# Patient Record
Sex: FEMALE | Race: WHITE
[De-identification: names, ages, dates, MRNs, and addresses within clinical notes are randomized per-mention and may not be internally consistent; named-entity substitution may affect disease eponyms.]

---

## 2017-07-20 ENCOUNTER — HOSPITAL ENCOUNTER (INPATIENT)
Dept: HOSPITAL 47 - EC | Age: 72
LOS: 1 days | Discharge: HOME | DRG: 312 | End: 2017-07-21
Attending: HOSPITALIST | Admitting: HOSPITALIST
Payer: MEDICARE

## 2017-07-20 VITALS — BODY MASS INDEX: 17.9 KG/M2

## 2017-07-20 DIAGNOSIS — Z79.899: ICD-10-CM

## 2017-07-20 DIAGNOSIS — Z85.828: ICD-10-CM

## 2017-07-20 DIAGNOSIS — E78.5: ICD-10-CM

## 2017-07-20 DIAGNOSIS — F17.200: ICD-10-CM

## 2017-07-20 DIAGNOSIS — G25.0: ICD-10-CM

## 2017-07-20 DIAGNOSIS — E83.42: ICD-10-CM

## 2017-07-20 DIAGNOSIS — E86.9: ICD-10-CM

## 2017-07-20 DIAGNOSIS — I95.9: ICD-10-CM

## 2017-07-20 DIAGNOSIS — R55: Primary | ICD-10-CM

## 2017-07-20 DIAGNOSIS — I10: ICD-10-CM

## 2017-07-20 DIAGNOSIS — T50.2X5A: ICD-10-CM

## 2017-07-20 DIAGNOSIS — R00.1: ICD-10-CM

## 2017-07-20 DIAGNOSIS — K21.9: ICD-10-CM
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
## 2017-07-20 DIAGNOSIS — J44.9: ICD-10-CM

## 2017-07-20 DIAGNOSIS — M19.90: ICD-10-CM

## 2017-07-20 LAB
ALP SERPL-CCNC: 107 U/L (ref 38–126)
ALT SERPL-CCNC: 30 U/L (ref 9–52)
ANION GAP SERPL CALC-SCNC: 9 MMOL/L
APTT BLD: 25 SEC (ref 22–30)
AST SERPL-CCNC: 17 U/L (ref 14–36)
BASOPHILS # BLD AUTO: 0.1 K/UL (ref 0–0.2)
BASOPHILS NFR BLD AUTO: 1 %
BUN SERPL-SCNC: 19 MG/DL (ref 7–17)
CALCIUM SPEC-MCNC: 9 MG/DL (ref 8.4–10.2)
CH: 30.7
CHCM: 34.1
CHLORIDE SERPL-SCNC: 105 MMOL/L (ref 98–107)
CK SERPL-CCNC: 40 U/L (ref 30–135)
CK SERPL-CCNC: 49 U/L (ref 30–135)
CO2 SERPL-SCNC: 25 MMOL/L (ref 22–30)
EOSINOPHIL # BLD AUTO: 0.1 K/UL (ref 0–0.7)
EOSINOPHIL NFR BLD AUTO: 1 %
ERYTHROCYTE [DISTWIDTH] IN BLOOD BY AUTOMATED COUNT: 4.75 M/UL (ref 3.8–5.4)
ERYTHROCYTE [DISTWIDTH] IN BLOOD: 14.3 % (ref 11.5–15.5)
GLUCOSE SERPL-MCNC: 145 MG/DL (ref 74–99)
HCT VFR BLD AUTO: 43 % (ref 34–46)
HDW: 2.46
HGB BLD-MCNC: 14.5 GM/DL (ref 11.4–16)
INR PPP: 1.2 (ref ?–1.2)
LUC NFR BLD AUTO: 1 %
LYMPHOCYTES # SPEC AUTO: 1.3 K/UL (ref 1–4.8)
LYMPHOCYTES NFR SPEC AUTO: 17 %
MAGNESIUM SPEC-SCNC: 1.5 MG/DL (ref 1.6–2.3)
MCH RBC QN AUTO: 30.4 PG (ref 25–35)
MCHC RBC AUTO-ENTMCNC: 33.6 G/DL (ref 31–37)
MCV RBC AUTO: 90.4 FL (ref 80–100)
MONOCYTES # BLD AUTO: 0.3 K/UL (ref 0–1)
MONOCYTES NFR BLD AUTO: 4 %
NEUTROPHILS # BLD AUTO: 5.7 K/UL (ref 1.3–7.7)
NEUTROPHILS NFR BLD AUTO: 77 %
NON-AFRICAN AMERICAN GFR(MDRD): >60
PH UR: 7.5 [PH] (ref 5–8)
PHOSPHATE SERPL-MCNC: 3 MG/DL (ref 2.5–4.5)
POTASSIUM SERPL-SCNC: 3.2 MMOL/L (ref 3.5–5.1)
PROT SERPL-MCNC: 5.4 G/DL (ref 6.3–8.2)
PT BLD: 11.7 SEC (ref 9–12)
SODIUM SERPL-SCNC: 139 MMOL/L (ref 137–145)
SP GR UR: 1 (ref 1–1.03)
TROPONIN I SERPL-MCNC: <0.012 NG/ML (ref 0–0.03)
TROPONIN I SERPL-MCNC: <0.012 NG/ML (ref 0–0.03)
UA BILLING (MACRO VS. MICRO): (no result)
UROBILINOGEN UR QL STRIP: <2 MG/DL (ref ?–2)
WBC # BLD AUTO: 0.06 10*3/UL
WBC # BLD AUTO: 7.4 K/UL (ref 3.8–10.6)
WBC (PEROX): 7.09

## 2017-07-20 PROCEDURE — 84100 ASSAY OF PHOSPHORUS: CPT

## 2017-07-20 PROCEDURE — 85025 COMPLETE CBC W/AUTO DIFF WBC: CPT

## 2017-07-20 PROCEDURE — 81003 URINALYSIS AUTO W/O SCOPE: CPT

## 2017-07-20 PROCEDURE — 80061 LIPID PANEL: CPT

## 2017-07-20 PROCEDURE — 93005 ELECTROCARDIOGRAM TRACING: CPT

## 2017-07-20 PROCEDURE — 85730 THROMBOPLASTIN TIME PARTIAL: CPT

## 2017-07-20 PROCEDURE — 36415 COLL VENOUS BLD VENIPUNCTURE: CPT

## 2017-07-20 PROCEDURE — 85027 COMPLETE CBC AUTOMATED: CPT

## 2017-07-20 PROCEDURE — 80048 BASIC METABOLIC PNL TOTAL CA: CPT

## 2017-07-20 PROCEDURE — 99285 EMERGENCY DEPT VISIT HI MDM: CPT

## 2017-07-20 PROCEDURE — 83735 ASSAY OF MAGNESIUM: CPT

## 2017-07-20 PROCEDURE — 82553 CREATINE MB FRACTION: CPT

## 2017-07-20 PROCEDURE — 93306 TTE W/DOPPLER COMPLETE: CPT

## 2017-07-20 PROCEDURE — 71020: CPT

## 2017-07-20 PROCEDURE — 85610 PROTHROMBIN TIME: CPT

## 2017-07-20 PROCEDURE — 82550 ASSAY OF CK (CPK): CPT

## 2017-07-20 PROCEDURE — 84484 ASSAY OF TROPONIN QUANT: CPT

## 2017-07-20 PROCEDURE — 80053 COMPREHEN METABOLIC PANEL: CPT

## 2017-07-20 RX ADMIN — CEFAZOLIN SCH MLS/HR: 330 INJECTION, POWDER, FOR SOLUTION INTRAMUSCULAR; INTRAVENOUS at 23:54

## 2017-07-20 RX ADMIN — MAGNESIUM SULFATE IN DEXTROSE SCH MLS/HR: 10 INJECTION, SOLUTION INTRAVENOUS at 16:05

## 2017-07-20 RX ADMIN — MAGNESIUM SULFATE IN DEXTROSE SCH MLS/HR: 10 INJECTION, SOLUTION INTRAVENOUS at 17:19

## 2017-07-20 RX ADMIN — CEFAZOLIN SCH MLS/HR: 330 INJECTION, POWDER, FOR SOLUTION INTRAMUSCULAR; INTRAVENOUS at 13:12

## 2017-07-20 NOTE — P.HPIM
History of Present Illness


Patient is a pleasant 71-year-old female came in with complaints of dizziness, 

denied any syncopal episode but patient apparently had a fall secondary to 

dizziness did not hurt anywhere, denied any seizure-like activity.  Patient 

denied any diarrhea denied any nausea vomiting.  Patient is on 

hydrochlorothiazide and ACE inhibitor combination at home, patient blood 

pressures are in the low normal side and has some sinus bradycardia.  Patient 

is admitted for overnight monitoring with telemetry and echocardiogram will be 

obtained.  Troponin so far negative patient denied any chest pain denied any 

palpitations.  Patient patient's creatinine is bit elevated probably due to 

intravascular volume depletion which may have contributed to her dizziness.  

Patient was started on IV normal saline.








Review of Systems





REVIEW OF SYSTEMS: 


CONSTITUTIONAL: No fever, no malaise, no fatigue. 


HEENT: No recent visual problems or hearing problems. Denied any sore throat. 


CARDIOVASCULAR: No chest pain, orthopnea, PND, no palpitations, no syncope. 


PULMONARY: No shortness of breath, no cough, no hemoptysis. 


GASTROINTESTINAL: No diarrhea, no nausea, no vomiting, no abdominal pain. 

Normoactive bowel sounds. 


NEUROLOGICAL: No headaches, no weakness, no numbness. 


HEMATOLOGICAL: Denies any bleeding or petechiae. 


GENITOURINARY: Denies any burning micturition, frequency, or urgency. 


MUSCULOSKELETAL/RHEUMATOLOGICAL: Denies any joint pain, swelling, or any muscle 

pain. 


ENDOCRINE: Denies any polyuria or polydipsia. 





The rest of the 14-point review of systems is negative.








Past Medical History


Past Medical History: Cancer, COPD, GERD/Reflux, Hyperlipidemia, Hypertension, 

Osteoarthritis (OA), Renal Disease, Syncope


Additional Past Medical History / Comment(s): SYNCOPAL EPISODES RECENTLY, 

ESSENTIAL TREMORS, djd, NEPHROLITHIASIS-PT PASSED STONE ON HER WON, SKIN 

CANCERS WITH REMOVAL.


History of Any Multi-Drug Resistant Organisms: None Reported


Past Surgical History: Hysterectomy, Tonsillectomy


Additional Past Surgical History / Comment(s): SKIN CANCER REMOVALS, 

COLONOSCOPY.


Past Anesthesia/Blood Transfusion Reactions: No Reported Reaction


Smoking Status: Current every day smoker





- Past Family History


  ** Mother


Additional Family Medical History / Comment(s): MOTHER HAD A "BAD HEART."





  ** Father


Additional Family Medical History / Comment(s): FATHER  IN A PLANE CRASH 

EARLIER IN LIFE.





Medications and Allergies


 Home Medications











 Medication  Instructions  Recorded  Confirmed  Type


 


Atorvastatin [Lipitor] 20 mg PO HS 17 History


 


Enalapril/Hydrochlorothiazide 1 tab PO DAILY 17 History





[Enalapril-Hctz 10-25 mg Tablet]    


 


risperiDONE [RisperDAL] 2 mg PO DAILY 17 History











 Allergies











Allergy/AdvReac Type Severity Reaction Status Date / Time


 


No Known Allergies Allergy   Verified 17 10:49














Physical Exam


Vitals: 


 Vital Signs











  Temp Pulse Pulse Resp BP BP Pulse Ox


 


 17 14:00  96.9 F L   58 L  18   146/79  100


 


 17 13:39  97.4 F L      


 


 17 13:15   67   20  109/60   97


 


 17 12:52        100


 


 17 12:22   56 L   16  115/58   97


 


 17 11:20   53 L   18  118/87   98


 


 17 10:59    47 L    


 


 17 10:49  96.8 F L  55 L   16  80/50   100








 Intake and Output











 17





 06:59 14:59 22:59


 


Intake Total  100 


 


Balance  100 


 


Intake:   


 


  Amount of Fluid Infused (  100 





  ml)   


 


Other:   


 


  Voiding Method  Bedpan 


 


  Weight  54.5 kg 








 Patient Weight











 17





 06:59


 


Weight 54.5 kg














PHYSICAL EXAMINATION: 





GENERAL: The patient is alert and oriented x3, not in any acute distress. Well 

developed, well nourished. 


HEENT: Pupils are round and equally reacting to light. EOMI. No scleral 

icterus. No conjunctival pallor. Normocephalic, atraumatic. No pharyngeal 

erythema. No thyromegaly. 


CARDIOVASCULAR: S1 and S2 present. No murmurs, rubs, or gallops. 


PULMONARY: Chest is clear to auscultation, no wheezing or crackles. 


ABDOMEN: Soft, nontender, nondistended, normoactive bowel sounds. No palpable 

organomegaly. 


MUSCULOSKELETAL: No joint swelling or deformity.


EXTREMITIES: No cyanosis, clubbing, or pedal edema. 


NEUROLOGICAL: Gross neurological examination did not reveal any focal deficits. 


SKIN: No rashes. 








Results


CBC & Chem 7: 


 17 11:05





 17 11:05


Labs: 


 Abnormal Lab Results - Last 24 Hours (Table)











  17 Range/Units





  11:05 11:05 12:00 


 


INR   1.2 H   (<1.2)  


 


Potassium  3.2 L    (3.5-5.1)  mmol/L


 


BUN  19 H    (7-17)  mg/dL


 


Glucose  145 H    (74-99)  mg/dL


 


Magnesium  1.5 L    (1.6-2.3)  mg/dL


 


Total Protein  5.4 L    (6.3-8.2)  g/dL


 


Albumin  3.2 L    (3.5-5.0)  g/dL


 


Urine Protein    Trace H  (Negative)  














Thrombosis Risk Factor Assmnt





- Choose All That Apply


Any of the Below Risk Factors Present?: Yes


Each Factor Represents 1 point: Abnormal pulmonary function (COPD)


Other Risk Factors: Yes


Each Risk Factor Represents 2 Points: Age 61-74 years, Malignancy


Other congenital or acquired thrombophilia - If yes, enter type in comment: No


Thrombosis Risk Factor Assessment Total Risk Factor Score: 5


Thrombosis Risk Factor Assessment Level: High Risk





Assessment and Plan


Plan: 





#1 dizziness and near syncopal episode, orthostatic vitals are negative I 

believe it's secondary to intravascular volume depletion and diuretic therapy 

she is on for hypertension.  And evidences will be discontinued.  And patient 

will be started on IV fluids as mentioned above.  The limited monitoring, 

echocardiogram.


#2 hypomagnesemia: Magnesium will be supplemented.


#3 mild sinus bradycardia my suspicion is low that this contributed to her near 

syncopal episode.  But will monitor here.


#3 hypertension: Because of low normal blood pressures her antihypertensive 

medications are being held.

## 2017-07-20 NOTE — XR
EXAMINATION TYPE: XR chest 2V

 

DATE OF EXAM: 7/20/2017

 

COMPARISON: 5/27/2013

 

HISTORY: Shortness of breath

 

TECHNIQUE:  Frontal and lateral views of the chest are obtained.

 

FINDINGS:

 

Scattered senescent parenchymal changes noted. Hyperinflation compatible with COPD. 

 

No evidence for infiltrate. No evidence for atelectasis.

 

Heart size is stable.

 

Mediastinal structures are stable and grossly unremarkable.

 

No evidence for hilar prominence.

 

Degenerative changes dorsal spine. 

 

IMPRESSION:

1. No evidence for acute pulmonary disease.

## 2017-07-20 NOTE — ED
General Adult HPI





- General


Stated complaint: Syncopial episode


Time Seen by Provider: 17 10:56


Source: RN notes reviewed, old records reviewed





- History of Present Illness


Initial comments: 





This is a 71-year-old female ER status post syncopal event.  Patient is poor 

strain, history obtained from EMS and patient's family





- Related Data


 Home Medications











 Medication  Instructions  Recorded  Confirmed


 


Atorvastatin [Lipitor] 20 mg PO HS 17


 


risperiDONE [RisperDAL] 2 mg PO DAILY 17











 Allergies











Allergy/AdvReac Type Severity Reaction Status Date / Time


 


No Known Allergies Allergy   Verified 17 10:49














Review of Systems


ROS Statement: 


Those systems with pertinent positive or pertinent negative responses have been 

documented in the HPI.





ROS Other: All systems not noted in ROS Statement are negative.





Past Medical History





- Past Family History


  ** Mother


Additional Family Medical History / Comment(s): MOTHER HAD A "BAD HEART."





  ** Father


Additional Family Medical History / Comment(s): FATHER  IN A PLANE CRASH 

EARLIER IN LIFE.





General Exam


General appearance: alert, in no apparent distress


Head exam: Present: atraumatic, normocephalic, normal inspection


Eye exam: Present: normal appearance, PERRL, EOMI.  Absent: scleral icterus, 

conjunctival injection, periorbital swelling


ENT exam: Present: normal exam, mucous membranes moist


Neck exam: Present: normal inspection.  Absent: tenderness, meningismus, 

lymphadenopathy


Respiratory exam: Present: normal lung sounds bilaterally.  Absent: respiratory 

distress, wheezes, rales, rhonchi, stridor


Cardiovascular Exam: Present: regular rate, normal rhythm, normal heart sounds.

  Absent: systolic murmur, diastolic murmur, rubs, gallop, clicks


GI/Abdominal exam: Present: soft, normal bowel sounds.  Absent: distended, 

tenderness, guarding, rebound, rigid


Extremities exam: Present: normal inspection, full ROM, normal capillary 

refill.  Absent: tenderness, pedal edema, joint swelling, calf tenderness


Back exam: Present: normal inspection


Neurological exam: Present: alert, oriented X3, CN II-XII intact


Psychiatric exam: Present: normal affect, normal mood


Skin exam: Present: warm, dry, intact, normal color.  Absent: rash





Course


 Vital Signs











  17





  10:49 10:59 11:20


 


Temperature 96.8 F L  


 


Pulse Rate 55 L  53 L


 


Pulse Rate [  47 L 





Cardiac Monitor   





]   


 


Respiratory 16  18





Rate   


 


Blood Pressure 80/50  118/87


 


O2 Sat by Pulse 100  98





Oximetry   














  17





  12:22 12:52 13:15


 


Temperature   


 


Pulse Rate 56 L  67


 


Pulse Rate [   





Cardiac Monitor   





]   


 


Respiratory 16  20





Rate   


 


Blood Pressure 115/58  109/60


 


O2 Sat by Pulse 97 100 97





Oximetry   














  17





  13:39


 


Temperature 97.4 F L


 


Pulse Rate 


 


Pulse Rate [ 





Cardiac Monitor 





] 


 


Respiratory 





Rate 


 


Blood Pressure 


 


O2 Sat by Pulse 





Oximetry 














EKG Findings





- EKG Comments:


EKG Findings:: EKG shows sinus bradycardia rate of 54, pO2 of 60, QRS 1:30, QTC 

464





Medical Decision Making





- Medical Decision Making





71 female to the ER for evaluation of syncopal event.  Multiple syncopal events 

in the last few weeks to months.  Patient had 2 syncopal events today with full 

class at the grocery store.  And then when she really did resolve and her 

resolved, patient stood up and then had another syncopal event, question 

arrhythmia, patient has W Sandoval secondary to decreased appetite.  Patient be 

admitted, O replacement and cardiac evaluation





- Lab Data


Result diagrams: 


 17 06:04





 17 06:04


 Lab Results











  17 Range/Units





  11:05 11:05 11:05 


 


WBC   7.4   (3.8-10.6)  k/uL


 


RBC   4.75   (3.80-5.40)  m/uL


 


Hgb   14.5   (11.4-16.0)  gm/dL


 


Hct   43.0   (34.0-46.0)  %


 


MCV   90.4   (80.0-100.0)  fL


 


MCH   30.4   (25.0-35.0)  pg


 


MCHC   33.6   (31.0-37.0)  g/dL


 


RDW   14.3   (11.5-15.5)  %


 


Plt Count   227   (150-450)  k/uL


 


Neutrophils %   77   %


 


Lymphocytes %   17   %


 


Monocytes %   4   %


 


Eosinophils %   1   %


 


Basophils %   1   %


 


Neutrophils #   5.7   (1.3-7.7)  k/uL


 


Lymphocytes #   1.3   (1.0-4.8)  k/uL


 


Monocytes #   0.3   (0-1.0)  k/uL


 


Eosinophils #   0.1   (0-0.7)  k/uL


 


Basophils #   0.1   (0-0.2)  k/uL


 


PT     (9.0-12.0)  sec


 


INR     (<1.2)  


 


APTT     (22.0-30.0)  sec


 


Sodium    139  (137-145)  mmol/L


 


Potassium    3.2 L  (3.5-5.1)  mmol/L


 


Chloride    105  ()  mmol/L


 


Carbon Dioxide    25  (22-30)  mmol/L


 


Anion Gap    9  mmol/L


 


BUN    19 H  (7-17)  mg/dL


 


Creatinine    0.75  (0.52-1.04)  mg/dL


 


Est GFR (MDRD) Af Amer    >60  (>60 ml/min/1.73 sqM)  


 


Est GFR (MDRD) Non-Af    >60  (>60 ml/min/1.73 sqM)  


 


Glucose    145 H  (74-99)  mg/dL


 


Calcium    9.0  (8.4-10.2)  mg/dL


 


Phosphorus    3.0  (2.5-4.5)  mg/dL


 


Magnesium    1.5 L  (1.6-2.3)  mg/dL


 


Total Bilirubin    1.2  (0.2-1.3)  mg/dL


 


AST    17  (14-36)  U/L


 


ALT    30  (9-52)  U/L


 


Alkaline Phosphatase    107  ()  U/L


 


Total Creatine Kinase  40    ()  U/L


 


CK-MB (CK-2)  1.6    (0.0-2.4)  ng/mL


 


CK-MB (CK-2) Rel Index  4.0    


 


Troponin I  <0.012    (0.000-0.034)  ng/mL


 


Total Protein    5.4 L  (6.3-8.2)  g/dL


 


Albumin    3.2 L  (3.5-5.0)  g/dL


 


Urine Color     


 


Urine Appearance     (Clear)  


 


Urine pH     (5.0-8.0)  


 


Ur Specific Gravity     (1.001-1.035)  


 


Urine Protein     (Negative)  


 


Urine Glucose (UA)     (Negative)  


 


Urine Ketones     (Negative)  


 


Urine Blood     (Negative)  


 


Urine Nitrite     (Negative)  


 


Urine Bilirubin     (Negative)  


 


Urine Urobilinogen     (<2.0)  mg/dL


 


Ur Leukocyte Esterase     (Negative)  














  07/20/17 07/20/17 Range/Units





  11:05 12:00 


 


WBC    (3.8-10.6)  k/uL


 


RBC    (3.80-5.40)  m/uL


 


Hgb    (11.4-16.0)  gm/dL


 


Hct    (34.0-46.0)  %


 


MCV    (80.0-100.0)  fL


 


MCH    (25.0-35.0)  pg


 


MCHC    (31.0-37.0)  g/dL


 


RDW    (11.5-15.5)  %


 


Plt Count    (150-450)  k/uL


 


Neutrophils %    %


 


Lymphocytes %    %


 


Monocytes %    %


 


Eosinophils %    %


 


Basophils %    %


 


Neutrophils #    (1.3-7.7)  k/uL


 


Lymphocytes #    (1.0-4.8)  k/uL


 


Monocytes #    (0-1.0)  k/uL


 


Eosinophils #    (0-0.7)  k/uL


 


Basophils #    (0-0.2)  k/uL


 


PT  11.7   (9.0-12.0)  sec


 


INR  1.2 H   (<1.2)  


 


APTT  25.0   (22.0-30.0)  sec


 


Sodium    (137-145)  mmol/L


 


Potassium    (3.5-5.1)  mmol/L


 


Chloride    ()  mmol/L


 


Carbon Dioxide    (22-30)  mmol/L


 


Anion Gap    mmol/L


 


BUN    (7-17)  mg/dL


 


Creatinine    (0.52-1.04)  mg/dL


 


Est GFR (MDRD) Af Amer    (>60 ml/min/1.73 sqM)  


 


Est GFR (MDRD) Non-Af    (>60 ml/min/1.73 sqM)  


 


Glucose    (74-99)  mg/dL


 


Calcium    (8.4-10.2)  mg/dL


 


Phosphorus    (2.5-4.5)  mg/dL


 


Magnesium    (1.6-2.3)  mg/dL


 


Total Bilirubin    (0.2-1.3)  mg/dL


 


AST    (14-36)  U/L


 


ALT    (9-52)  U/L


 


Alkaline Phosphatase    ()  U/L


 


Total Creatine Kinase    ()  U/L


 


CK-MB (CK-2)    (0.0-2.4)  ng/mL


 


CK-MB (CK-2) Rel Index    


 


Troponin I    (0.000-0.034)  ng/mL


 


Total Protein    (6.3-8.2)  g/dL


 


Albumin    (3.5-5.0)  g/dL


 


Urine Color   Light Yellow  


 


Urine Appearance   Clear  (Clear)  


 


Urine pH   7.5  (5.0-8.0)  


 


Ur Specific Gravity   1.005  (1.001-1.035)  


 


Urine Protein   Trace H  (Negative)  


 


Urine Glucose (UA)   Negative  (Negative)  


 


Urine Ketones   Negative  (Negative)  


 


Urine Blood   Negative  (Negative)  


 


Urine Nitrite   Negative  (Negative)  


 


Urine Bilirubin   Negative  (Negative)  


 


Urine Urobilinogen   <2.0  (<2.0)  mg/dL


 


Ur Leukocyte Esterase   Negative  (Negative)  














- Radiology Data


Radiology results: report reviewed (chest x-rays negative for acute disease), 

image reviewed





Disposition


Clinical Impression: 


 Syncope and collapse





Disposition: ADMITTED AS IP TO THIS [facility]


Condition: Stable

## 2017-07-21 VITALS — SYSTOLIC BLOOD PRESSURE: 135 MMHG | HEART RATE: 54 BPM | TEMPERATURE: 98.4 F | DIASTOLIC BLOOD PRESSURE: 61 MMHG

## 2017-07-21 VITALS — RESPIRATION RATE: 18 BRPM

## 2017-07-21 LAB
ANION GAP SERPL CALC-SCNC: 6 MMOL/L
BUN SERPL-SCNC: 18 MG/DL (ref 7–17)
CALCIUM SPEC-MCNC: 8.6 MG/DL (ref 8.4–10.2)
CH: 30.4
CHCM: 33.2
CHLORIDE SERPL-SCNC: 109 MMOL/L (ref 98–107)
CHOLEST SERPL-MCNC: 120 MG/DL (ref ?–200)
CK SERPL-CCNC: 45 U/L (ref 30–135)
CO2 SERPL-SCNC: 25 MMOL/L (ref 22–30)
ERYTHROCYTE [DISTWIDTH] IN BLOOD BY AUTOMATED COUNT: 4.54 M/UL (ref 3.8–5.4)
ERYTHROCYTE [DISTWIDTH] IN BLOOD: 14.2 % (ref 11.5–15.5)
GLUCOSE SERPL-MCNC: 82 MG/DL (ref 74–99)
HCT VFR BLD AUTO: 41.8 % (ref 34–46)
HDLC SERPL-MCNC: 51 MG/DL (ref 40–60)
HDW: 2.36
HGB BLD-MCNC: 14 GM/DL (ref 11.4–16)
MCH RBC QN AUTO: 30.8 PG (ref 25–35)
MCHC RBC AUTO-ENTMCNC: 33.4 G/DL (ref 31–37)
MCV RBC AUTO: 92.1 FL (ref 80–100)
NON-AFRICAN AMERICAN GFR(MDRD): >60
POTASSIUM SERPL-SCNC: 3.9 MMOL/L (ref 3.5–5.1)
SODIUM SERPL-SCNC: 140 MMOL/L (ref 137–145)
TRIGL SERPL-MCNC: 54 MG/DL (ref ?–150)
TROPONIN I SERPL-MCNC: 0.01 NG/ML (ref 0–0.03)
WBC # BLD AUTO: 6.3 K/UL (ref 3.8–10.6)

## 2017-07-21 RX ADMIN — CEFAZOLIN SCH MLS/HR: 330 INJECTION, POWDER, FOR SOLUTION INTRAMUSCULAR; INTRAVENOUS at 08:49

## 2017-07-21 NOTE — CONS
Lee Ann is a 71 year old lady who presented to the hospital with dizziness and 
near syncope.   The patient was shopping at [employer], felt very dizzy, went down 
to her feet and did not have any loss of consciousness.  She denies any 
seizures.  She denies any bladder or bowel incontinence or focal neurological 
deficits.  She gradually got back to her feet and felt well.  The patient has 
lost quite a bit of weight.  She used to weight about 160 pounds and is 
currently in the 117 pounds.  Initially she tried to lose weight.  Subsequently 
she has not been able to stop losing weight.  The patient is on ace inhibitor, 
diuretic combination for her hypertension, probably her dizziness and near 
syncope are related to intravascular volume depletion and hypotension.  I am 
going to start the medication.   At the time of my evaluation this morning, she 
appears comfortable at rest, ambulating without any problems.  Remains in sinus 
rhythm.  EKG does not reveal acute ischemic changes.  Cardiac enzymes have been 
negative.  She has not had further episodes of dizziness or near syncope.





Past medical history is significant for hypertension, dyslipidemia. 



Medications include:

1.  Lipitor.

2.  Hydrochlorothiazide.

3.  Risperdal.



ALLERGIES:  NO DRUG ALLERGIES. 





Family history is negative for premature coronary artery disease.   



Social history is negative for smoking, ETOH abuse or drug abuse.  



Review of systems:  HEENT:  Unremarkable.  Cardiac as described above. 
Respiratory negative.  GI negative.  : Negative.  Musculoskeletal significant 
for arthritis.  Psych/social:  Negative.  Endocrine:  Negative.  Dermatological
:  Negative.  Constitutional: Negative. Oncological: Negative.  The rest of the 
system review is not relevant.   



On exam, comfortable at rest.  Vital signs are stable. There are no orthostatic 
changes.  There is no jugular venous distention.  Carotid upstroke is normal.  
There is no bruit.   Chest exam reveals good air entry bilaterally.  Heart exam 
first and second heart sounds.  No gallop.   Abdomen is soft, nontender.  
Examination of the extremities did not reveal any edema.  Peripheral pulses are 
felt. CNS exam did not reveal any focal neurological deficits.   



EKG shows sinus bradycardia.   Rhythm strip shows that the patient is in sinus 
rhythm without any episodes of high grade AV block.    Cardiac enzymes have 
been negative.   LDL cholesterol is 58.  



ASSESSMENT:  Near syncope, probably related to hypotension and intravascular 
volume depletion.   



PLAN:  I will obtain a 2D echo.  Ambulate her.  Stop the ace inhibitor diuretic 
combination that she is on. When stable, she can be discharged home.  I talked 
to her about her diet. She will follow up with me and we may consider an 
outpatient stress test along with Holter monitor if necessary. 
DARRION

## 2017-07-21 NOTE — ECHOF
Referral Reason:syncope



MEASUREMENTS

--------

HEIGHT: 170.2 cm

WEIGHT: 54.4 kg

BP: 146/79

RVIDd:   1.9 cm     (< 3.3)

IVSd:   0.8 cm     (0.6 - 1.1)

LVIDd:   4.4 cm     (3.9 - 5.3)

LVPWd:   0.9 cm     (0.6 - 1.1)

IVSs:   1.2 cm

LVIDs:   2.6 cm

LVPWs:   1.2 cm

LAESV Index (A-L):   10.83 ml/m

Ao Diam:   2.9 cm     (2.0 - 3.7)

AV Cusp:   1.4 cm     (1.5 - 2.6)

LA Diam:   2.2 cm     (2.7 - 3.8)

MV EXCURSION:   17.007 mm     (> 18.000)

MV EF SLOPE:   80 mm/s     (70 - 150)

EPSS:   0.7 cm

MV E Rafat:   0.60 m/s

MV DecT:   277 ms

MV A Rafat:   0.69 m/s

MV E/A Ratio:   0.87 

RAP:   5.00 mmHg

RVSP:   27.22 mmHg







FINDINGS

--------

Sinus rhythm.   Resting bradycardia (HR<60bpm).

This was a technically good study.

The left ventricular size is normal.   Overall left 

ventricular systolic function is normal with, an EF 

between 55 - 60 %.

The right ventricle is normal in size and function.

Normal LA  size by volume 22+/-6 ml/m2.

The right atrium is normal in size.

The aortic valve is trileaflet and appears structurally 

normal.

The mitral valve leaflets are moderately thickened.   

Mild mitral regurgitation is present.

Trace tricuspid regurgitation present.   There is no 

evidence of pulmonary hypertension.   The right 

ventricular systolic pressure, as measured by Doppler, 

is 27.22mmHg.

There is no pulmonic regurgitation present.

The aortic root size is normal.

Normal inferior vena cava with normal inspiratory 

collapse consistent with estimated right atrial 

pressure of  5 mmHg.

There is no pericardial effusion.



CONCLUSIONS

--------

1. Resting bradycardia (HR<60bpm).

2. There is no pulmonic regurgitation present.

3. The aortic root size is normal.

4. There is no pericardial effusion.

5. This was a technically good study.

6. Overall left ventricular systolic function is normal 

with, an EF between 55 - 60 %.

7. Normal LA size by volume 22+/-6 ml/m2.

8. The aortic valve is trileaflet and appears structurally 

normal.

9. The mitral valve leaflets are moderately thickened.

10. Mild mitral regurgitation is present.

11. Trace tricuspid regurgitation present.

12. There is no evidence of pulmonary hypertension.





SONOGRAPHER: Doron Garcia RDCS

## 2017-07-21 NOTE — P.DS
Providers


Date of admission: 


07/20/17 12:52





Expected date of discharge: 07/21/17


Attending physician: 


Irineo Ibrahim


Consults: 





 





07/20/17 12:52


Consult Physician Urgent 


   Consulting Provider: Divina Singh


   Consult Reason/Comments: SYNCOPE


   Do you want consulting provider notified?: Yes











Primary care physician: 


Duc Rosas





[facility] Course: 


Final Diagnoses:





#1 dizziness and near syncopal episode, orthostatic vitals are negative I 

believe it's secondary to intravascular volume depletion and diuretic therapy 

she is on for hypertension.  Antihypertensives discontinued. 


#2 hypomagnesemia: Magnesium will be supplemented.


#3 mild sinus bradycardia my suspicion is low that this contributed to her near 

syncopal episode. 


#3 hypertension: Because of low normal blood pressures her antihypertensive 

medications are being held.














Hospital course: Patient is a pleasant 71-year-old female came in with 

complaints of dizziness, near syncope, sustained a fall without complaints of 

pain,denied seizure-like activity. Denied diarrhea,nausea ,vomiting.  Mildly 

elevated creatinine secondary to intravascular volume depletion which may have 

contributed to her dizziness .Patient is on hydrochlorothiazide and ACE 

inhibitor combination at home, patient blood pressures are in the low normal 

side and has some sinus bradycardia.  Antihypertensives discontinued, 

maintained on gentle IV fluid hydration.  Significant clinical improvement.  

Evaluated by cardiology-EKG reviewed, reported without acute changes.  Echo 

reported normal LV function, no pulmonary hypertension.  Patient had no further 

episodes of dizziness or near syncope.  Cleared by cardiology for discharge 

home.




















The impression and plan of care has been dictated as directed as a scribe.





:


I performed a H&P examination of this patient and discussed the same with the 

dictator.  I agree with the dictator's note.  Any additional findings/opinions/

etc. will be noted.








Patient Condition at Discharge: Stable





Plan - Discharge Summary


New Discharge Prescriptions: 


Continue


   risperiDONE [RisperDAL] 2 mg PO DAILY


   Atorvastatin [Lipitor] 20 mg PO HS





Discontinued


   Enalapril/Hydrochlorothiazide [Enalapril-Hctz 10-25 mg Tablet] 1 tab PO DAILY


Discharge Medication List





Atorvastatin [Lipitor] 20 mg PO HS 07/20/17 [History]


risperiDONE [RisperDAL] 2 mg PO DAILY 07/20/17 [History]








Follow up Appointment(s)/Referral(s): 


Duc Rosas MD [Primary Care Provider] - 3 Days (Please call during normal 

business hours to make an appointment)


David Lo MD [STAFF PHYSICIAN] - 07/25/17 2:00 pm


()


Ambulatory/Diagnostic Orders: 


Complete Blood Count w/diff [LAB.AMB] Time Frame: 3 Days, Location: Determined 

By Patient


Patient Instructions/Handouts:  Heart Healthy Diet (DC), Syncope (DC)


Activity/Diet/Wound Care/Special Instructions: 





Diet: Cardiac





Activity: Limited until follow up





Discharge Disposition: HOME SELF-CARE

## 2021-04-06 ENCOUNTER — HOSPITAL ENCOUNTER (EMERGENCY)
Dept: HOSPITAL 47 - EC | Age: 76
Discharge: HOME | End: 2021-04-06
Payer: MEDICARE

## 2021-04-06 VITALS — HEART RATE: 73 BPM | RESPIRATION RATE: 20 BRPM | DIASTOLIC BLOOD PRESSURE: 104 MMHG | SYSTOLIC BLOOD PRESSURE: 166 MMHG

## 2021-04-06 VITALS — TEMPERATURE: 98.5 F

## 2021-04-06 DIAGNOSIS — F17.200: ICD-10-CM

## 2021-04-06 DIAGNOSIS — Z20.822: ICD-10-CM

## 2021-04-06 DIAGNOSIS — I10: ICD-10-CM

## 2021-04-06 DIAGNOSIS — Z79.51: ICD-10-CM

## 2021-04-06 DIAGNOSIS — K21.9: ICD-10-CM

## 2021-04-06 DIAGNOSIS — J44.1: Primary | ICD-10-CM

## 2021-04-06 DIAGNOSIS — E78.5: ICD-10-CM

## 2021-04-06 DIAGNOSIS — M19.90: ICD-10-CM

## 2021-04-06 DIAGNOSIS — F31.9: ICD-10-CM

## 2021-04-06 LAB
ALBUMIN SERPL-MCNC: 4.4 G/DL (ref 3.5–5)
ALP SERPL-CCNC: 98 U/L (ref 38–126)
ALT SERPL-CCNC: 18 U/L (ref 4–34)
ANION GAP SERPL CALC-SCNC: 10 MMOL/L
APTT BLD: 27.7 SEC (ref 22–30)
AST SERPL-CCNC: 33 U/L (ref 14–36)
BASOPHILS # BLD AUTO: 0.1 K/UL (ref 0–0.2)
BASOPHILS NFR BLD AUTO: 1 %
BUN SERPL-SCNC: 25 MG/DL (ref 7–17)
CALCIUM SPEC-MCNC: 9.8 MG/DL (ref 8.4–10.2)
CHLORIDE SERPL-SCNC: 104 MMOL/L (ref 98–107)
CO2 SERPL-SCNC: 28 MMOL/L (ref 22–30)
D DIMER PPP FEU-MCNC: 0.55 MG/L FEU (ref ?–0.6)
EOSINOPHIL # BLD AUTO: 0.4 K/UL (ref 0–0.7)
EOSINOPHIL NFR BLD AUTO: 5 %
ERYTHROCYTE [DISTWIDTH] IN BLOOD BY AUTOMATED COUNT: 5.75 M/UL (ref 3.8–5.4)
ERYTHROCYTE [DISTWIDTH] IN BLOOD: 13.3 % (ref 11.5–15.5)
GLUCOSE SERPL-MCNC: 94 MG/DL (ref 74–99)
HCT VFR BLD AUTO: 51.8 % (ref 34–46)
HGB BLD-MCNC: 17 GM/DL (ref 11.4–16)
INR PPP: 1 (ref ?–1.2)
LYMPHOCYTES # SPEC AUTO: 2.3 K/UL (ref 1–4.8)
LYMPHOCYTES NFR SPEC AUTO: 30 %
MCH RBC QN AUTO: 29.6 PG (ref 25–35)
MCHC RBC AUTO-ENTMCNC: 32.9 G/DL (ref 31–37)
MCV RBC AUTO: 90 FL (ref 80–100)
MONOCYTES # BLD AUTO: 0.5 K/UL (ref 0–1)
MONOCYTES NFR BLD AUTO: 6 %
NEUTROPHILS # BLD AUTO: 4.4 K/UL (ref 1.3–7.7)
NEUTROPHILS NFR BLD AUTO: 57 %
PLATELET # BLD AUTO: 208 K/UL (ref 150–450)
POTASSIUM SERPL-SCNC: 4.3 MMOL/L (ref 3.5–5.1)
PROT SERPL-MCNC: 7.2 G/DL (ref 6.3–8.2)
PT BLD: 10.9 SEC (ref 9–12)
SODIUM SERPL-SCNC: 142 MMOL/L (ref 137–145)
WBC # BLD AUTO: 7.7 K/UL (ref 3.8–10.6)

## 2021-04-06 PROCEDURE — 71046 X-RAY EXAM CHEST 2 VIEWS: CPT

## 2021-04-06 PROCEDURE — 87635 SARS-COV-2 COVID-19 AMP PRB: CPT

## 2021-04-06 PROCEDURE — 93005 ELECTROCARDIOGRAM TRACING: CPT

## 2021-04-06 PROCEDURE — 36415 COLL VENOUS BLD VENIPUNCTURE: CPT

## 2021-04-06 PROCEDURE — 85379 FIBRIN DEGRADATION QUANT: CPT

## 2021-04-06 PROCEDURE — 84484 ASSAY OF TROPONIN QUANT: CPT

## 2021-04-06 PROCEDURE — 96374 THER/PROPH/DIAG INJ IV PUSH: CPT

## 2021-04-06 PROCEDURE — 94640 AIRWAY INHALATION TREATMENT: CPT

## 2021-04-06 PROCEDURE — 85610 PROTHROMBIN TIME: CPT

## 2021-04-06 PROCEDURE — 83605 ASSAY OF LACTIC ACID: CPT

## 2021-04-06 PROCEDURE — 85730 THROMBOPLASTIN TIME PARTIAL: CPT

## 2021-04-06 PROCEDURE — 85025 COMPLETE CBC W/AUTO DIFF WBC: CPT

## 2021-04-06 PROCEDURE — 83880 ASSAY OF NATRIURETIC PEPTIDE: CPT

## 2021-04-06 PROCEDURE — 80053 COMPREHEN METABOLIC PANEL: CPT

## 2021-04-06 PROCEDURE — 99285 EMERGENCY DEPT VISIT HI MDM: CPT

## 2021-04-06 NOTE — ED
General Adult HPI





- General


Chief complaint: Shortness of Breath


Stated complaint: KIRTI


Time Seen by Provider: 21 15:16


Source: patient, EMS


Mode of arrival: EMS


Limitations: no limitations





- History of Present Illness


Initial comments: 





Patient presents the ED by ambulance for evaluation.  Patient states that she 

has felt increasingly dyspneic over the past 2 weeks or so.  Patient has a 

history of COPD and she continues to smoke.  Patient states that she has been 

using her nebulizer machine with minimal/temporary relief.  Patient admits to 

having a mild cough as well.  Patient denies having any pain, fever or chills, 

headache, focal neuro deficit, chest pain or pressure, hemoptysis, palpitations,

dizziness, abdominal pain, nausea/vomiting/diarrhea, bloody or melanotic stool, 

dysuria or urinary symptoms, decreased urine output, leg or calf swelling or 

pain, or any other symptoms or complaints.  Patient denies Covid vaccination.





- Related Data


                                Home Medications











 Medication  Instructions  Recorded  Confirmed


 


Budesonide 1 mg INHALATION RT-DAILY 21








                                  Previous Rx's











 Medication  Instructions  Recorded


 


Doxycycline Hyclate 100 mg PO Q12H 7 Days #14 tab 21


 


predniSONE [Deltasone] 20 mg PO BID #10 tab 21











                                    Allergies











Allergy/AdvReac Type Severity Reaction Status Date / Time


 


No Known Allergies Allergy   Verified 21 17:36














Review of Systems


ROS Statement: 


Those systems with pertinent positive or pertinent negative responses have been 

documented in the HPI.





ROS Other: All systems not noted in ROS Statement are negative.





Past Medical History


Past Medical History: Cancer, COPD, GERD/Reflux, Hyperlipidemia, Hypertension, 

Osteoarthritis (OA), Renal Disease, Syncope


Additional Past Medical History / Comment(s): SYNCOPAL EPISODES RECENTLY, 

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

 WITH REMOVAL.


History of Any Multi-Drug Resistant Organisms: None Reported


Past Surgical History: Hysterectomy, Tonsillectomy


Additional Past Surgical History / Comment(s): SKIN CANCER REMOVALS, 

COLONOSCOPY.


Past Anesthesia/Blood Transfusion Reactions: No Reported Reaction


Past Psychological History: Bipolar


Smoking Status: Current every day smoker


Past Alcohol Use History: None Reported


Past Drug Use History: None Reported





- Past Family History


  ** Mother


Additional Family Medical History / Comment(s): MOTHER HAD A "BAD HEART."





  ** Father


Additional Family Medical History / Comment(s): FATHER  IN A PLANE CRASH 

EARLIER IN LIFE.





General Exam


Limitations: no limitations


General appearance: alert, in no apparent distress


Head exam: Present: atraumatic, normocephalic


Eye exam: Present: normal appearance, EOMI


ENT exam: Present: mucous membranes moist


Neck exam: Present: other (Trachea is in midline)


Respiratory exam: Present: prolonged expiratory, other (Bilateral inspiratory 

and expiratory wheezes; equal breath sounds bilaterally).  Absent: respiratory 

distress, rales, rhonchi, stridor


Cardiovascular Exam: Present: regular rate, normal rhythm, normal heart sounds, 

other (Normal radial pulses bilaterally)


GI/Abdominal exam: Present: soft.  Absent: distended, tenderness, guarding


Extremities exam: Present: other (Negative Homans sign bilaterally).  Absent: 

tenderness, pedal edema, calf tenderness


Neurological exam: Present: alert, oriented X3.  Absent: motor sensory deficit


Psychiatric exam: Present: normal affect, normal mood


Skin exam: Present: warm, dry, intact, normal color





Course


                                   Vital Signs











  21





  14:29 14:46 16:37


 


Temperature  98.5 F 


 


Pulse Rate  77 69


 


Respiratory 18 24 





Rate   


 


Blood Pressure  183/89 


 


O2 Sat by Pulse  93 L 





Oximetry   














  21





  16:43 17:24


 


Temperature  


 


Pulse Rate 60 73


 


Respiratory  20





Rate  


 


Blood Pressure  166/104


 


O2 Sat by Pulse  91 L





Oximetry  














- Reevaluation(s)


Reevaluation #1: 





21 17:37


Patient's wheezing has now improved on examination, and she continues to be 

alert and breathing comfortably.  Patient states that her dyspnea has now 

improved despite her O2 saturation reading still being borderline low, and she 

denies development of any new symptoms while in the ED.  Patient is aware of her

 test results, and she feels comfortable going home at this time.  Patient was 

instructed to continue giving herself home albuterol neb treatments as needed/as

 prescribed.  Patient was also instructed to stop smoking.  Will provide patient

 with a prescription for a short course of oral prednisone as well as a course 

of antibiotics.  Patient's labs and chest x-ray are fairly unremarkable.  

Patient's Covid test is negative.  Patient was counseled about COPD, and she was

 clearly explained return and follow-up instructions.  Patient was instructed to

 have a low threshold for return to the ED should her symptoms worsen.  Patient 

was also instructed to follow up closely with her primary care provider.





EKG Findings





- EKG Comments:


EKG Findings:: Normal sinus rhythm, right bundle branch block, ventricular rate 

of 77 bpm, no ectopy, normal VT interval, QRS duration of 124 ms, normal QT 

interval, normal axis, no definite ST or T-wave abnormality





Medical Decision Making





- Lab Data


Result diagrams: 


                                 21 16:10





                                 21 16:10


                                   Lab Results











  21 Range/Units





  16:10 16:10 16:10 


 


WBC  7.7    (3.8-10.6)  k/uL


 


RBC  5.75 H    (3.80-5.40)  m/uL


 


Hgb  17.0 H    (11.4-16.0)  gm/dL


 


Hct  51.8 H    (34.0-46.0)  %


 


MCV  90.0    (80.0-100.0)  fL


 


MCH  29.6    (25.0-35.0)  pg


 


MCHC  32.9    (31.0-37.0)  g/dL


 


RDW  13.3    (11.5-15.5)  %


 


Plt Count  208    (150-450)  k/uL


 


MPV  9.2    


 


Neutrophils %  57    %


 


Lymphocytes %  30    %


 


Monocytes %  6    %


 


Eosinophils %  5    %


 


Basophils %  1    %


 


Neutrophils #  4.4    (1.3-7.7)  k/uL


 


Lymphocytes #  2.3    (1.0-4.8)  k/uL


 


Monocytes #  0.5    (0-1.0)  k/uL


 


Eosinophils #  0.4    (0-0.7)  k/uL


 


Basophils #  0.1    (0-0.2)  k/uL


 


PT   10.9   (9.0-12.0)  sec


 


INR   1.0   (<1.2)  


 


APTT   27.7   (22.0-30.0)  sec


 


D-Dimer   0.55   (<0.60)  mg/L FEU


 


Sodium    142  (137-145)  mmol/L


 


Potassium    4.3  (3.5-5.1)  mmol/L


 


Chloride    104  ()  mmol/L


 


Carbon Dioxide    28  (22-30)  mmol/L


 


Anion Gap    10  mmol/L


 


BUN    25 H  (7-17)  mg/dL


 


Creatinine    0.57  (0.52-1.04)  mg/dL


 


Est GFR (CKD-EPI)AfAm    >90  (>60 ml/min/1.73 sqM)  


 


Est GFR (CKD-EPI)NonAf    90  (>60 ml/min/1.73 sqM)  


 


Glucose    94  (74-99)  mg/dL


 


Plasma Lactic Acid Christian     (0.7-2.0)  mmol/L


 


Calcium    9.8  (8.4-10.2)  mg/dL


 


Total Bilirubin    1.5 H  (0.2-1.3)  mg/dL


 


AST    33  (14-36)  U/L


 


ALT    18  (4-34)  U/L


 


Alkaline Phosphatase    98  ()  U/L


 


Troponin I     (0.000-0.034)  ng/mL


 


NT-Pro-B Natriuret Pep     pg/mL


 


Total Protein    7.2  (6.3-8.2)  g/dL


 


Albumin    4.4  (3.5-5.0)  g/dL


 


Coronavirus (PCR)     (Not Detectd)  














  21 Range/Units





  16:10 16:10 16:10 


 


WBC     (3.8-10.6)  k/uL


 


RBC     (3.80-5.40)  m/uL


 


Hgb     (11.4-16.0)  gm/dL


 


Hct     (34.0-46.0)  %


 


MCV     (80.0-100.0)  fL


 


MCH     (25.0-35.0)  pg


 


MCHC     (31.0-37.0)  g/dL


 


RDW     (11.5-15.5)  %


 


Plt Count     (150-450)  k/uL


 


MPV     


 


Neutrophils %     %


 


Lymphocytes %     %


 


Monocytes %     %


 


Eosinophils %     %


 


Basophils %     %


 


Neutrophils #     (1.3-7.7)  k/uL


 


Lymphocytes #     (1.0-4.8)  k/uL


 


Monocytes #     (0-1.0)  k/uL


 


Eosinophils #     (0-0.7)  k/uL


 


Basophils #     (0-0.2)  k/uL


 


PT     (9.0-12.0)  sec


 


INR     (<1.2)  


 


APTT     (22.0-30.0)  sec


 


D-Dimer     (<0.60)  mg/L FEU


 


Sodium     (137-145)  mmol/L


 


Potassium     (3.5-5.1)  mmol/L


 


Chloride     ()  mmol/L


 


Carbon Dioxide     (22-30)  mmol/L


 


Anion Gap     mmol/L


 


BUN     (7-17)  mg/dL


 


Creatinine     (0.52-1.04)  mg/dL


 


Est GFR (CKD-EPI)AfAm     (>60 ml/min/1.73 sqM)  


 


Est GFR (CKD-EPI)NonAf     (>60 ml/min/1.73 sqM)  


 


Glucose     (74-99)  mg/dL


 


Plasma Lactic Acid Christian  1.4    (0.7-2.0)  mmol/L


 


Calcium     (8.4-10.2)  mg/dL


 


Total Bilirubin     (0.2-1.3)  mg/dL


 


AST     (14-36)  U/L


 


ALT     (4-34)  U/L


 


Alkaline Phosphatase     ()  U/L


 


Troponin I   <0.012   (0.000-0.034)  ng/mL


 


NT-Pro-B Natriuret Pep    425  pg/mL


 


Total Protein     (6.3-8.2)  g/dL


 


Albumin     (3.5-5.0)  g/dL


 


Coronavirus (PCR)     (Not Detectd)  














  21 Range/Units





  16:10 


 


WBC   (3.8-10.6)  k/uL


 


RBC   (3.80-5.40)  m/uL


 


Hgb   (11.4-16.0)  gm/dL


 


Hct   (34.0-46.0)  %


 


MCV   (80.0-100.0)  fL


 


MCH   (25.0-35.0)  pg


 


MCHC   (31.0-37.0)  g/dL


 


RDW   (11.5-15.5)  %


 


Plt Count   (150-450)  k/uL


 


MPV   


 


Neutrophils %   %


 


Lymphocytes %   %


 


Monocytes %   %


 


Eosinophils %   %


 


Basophils %   %


 


Neutrophils #   (1.3-7.7)  k/uL


 


Lymphocytes #   (1.0-4.8)  k/uL


 


Monocytes #   (0-1.0)  k/uL


 


Eosinophils #   (0-0.7)  k/uL


 


Basophils #   (0-0.2)  k/uL


 


PT   (9.0-12.0)  sec


 


INR   (<1.2)  


 


APTT   (22.0-30.0)  sec


 


D-Dimer   (<0.60)  mg/L FEU


 


Sodium   (137-145)  mmol/L


 


Potassium   (3.5-5.1)  mmol/L


 


Chloride   ()  mmol/L


 


Carbon Dioxide   (22-30)  mmol/L


 


Anion Gap   mmol/L


 


BUN   (7-17)  mg/dL


 


Creatinine   (0.52-1.04)  mg/dL


 


Est GFR (CKD-EPI)AfAm   (>60 ml/min/1.73 sqM)  


 


Est GFR (CKD-EPI)NonAf   (>60 ml/min/1.73 sqM)  


 


Glucose   (74-99)  mg/dL


 


Plasma Lactic Acid Christian   (0.7-2.0)  mmol/L


 


Calcium   (8.4-10.2)  mg/dL


 


Total Bilirubin   (0.2-1.3)  mg/dL


 


AST   (14-36)  U/L


 


ALT   (4-34)  U/L


 


Alkaline Phosphatase   ()  U/L


 


Troponin I   (0.000-0.034)  ng/mL


 


NT-Pro-B Natriuret Pep   pg/mL


 


Total Protein   (6.3-8.2)  g/dL


 


Albumin   (3.5-5.0)  g/dL


 


Coronavirus (PCR)  Not Detected  (Not Detectd)  














- Radiology Data


Radiology results: report reviewed (Chest x-ray: No acute cardiopulmonary 

process)





Disposition


Clinical Impression: 


 COPD exacerbation





Disposition: HOME SELF-CARE


Condition: Stable


Instructions (If sedation given, give patient instructions):  COPD (Chronic 

Obstructive Pulmonary Disease) (ED), Dyspnea (ED)


Additional Instructions: 


Return to the ER immediately should you develop increased shortness of breath, a

 fever, chest pain, feeling dizzy or faint, or new or worsening symptoms.  

Follow up closely with your primary care provider.


Prescriptions: 


predniSONE [Deltasone] 20 mg PO BID #10 tab


Doxycycline Hyclate 100 mg PO Q12H 7 Days #14 tab


Is patient prescribed a controlled substance at d/c from ED?: No


Referrals: 


Duc Rosas MD [Primary Care Provider] - 1-2 days


Time of Disposition: 17:39

## 2021-04-06 NOTE — XR
EXAMINATION TYPE: XR chest 2V

 

DATE OF EXAM: 4/6/2021

 

COMPARISON: 7/20/17

 

HISTORY: Chest pain

 

TECHNIQUE:  Frontal and lateral views of the chest are obtained.

 

FINDINGS:  

 

There is no focal air space opacity.

 

No evidence for pneumothorax.  No pleural effusion.

 

The cardiac silhouette size is within normal limits.

 

The osseous structures are grossly intact.

 

IMPRESSION:  

 

1.  No acute cardiopulmonary process.

## 2021-04-16 ENCOUNTER — HOSPITAL ENCOUNTER (INPATIENT)
Dept: HOSPITAL 47 - EC | Age: 76
LOS: 4 days | Discharge: HOME | DRG: 191 | End: 2021-04-20
Attending: INTERNAL MEDICINE | Admitting: INTERNAL MEDICINE
Payer: MEDICARE

## 2021-04-16 DIAGNOSIS — Z82.49: ICD-10-CM

## 2021-04-16 DIAGNOSIS — G25.0: ICD-10-CM

## 2021-04-16 DIAGNOSIS — Z79.899: ICD-10-CM

## 2021-04-16 DIAGNOSIS — R06.03: ICD-10-CM

## 2021-04-16 DIAGNOSIS — M19.90: ICD-10-CM

## 2021-04-16 DIAGNOSIS — R09.02: ICD-10-CM

## 2021-04-16 DIAGNOSIS — Z98.890: ICD-10-CM

## 2021-04-16 DIAGNOSIS — E78.5: ICD-10-CM

## 2021-04-16 DIAGNOSIS — Z85.828: ICD-10-CM

## 2021-04-16 DIAGNOSIS — Z90.89: ICD-10-CM

## 2021-04-16 DIAGNOSIS — D72.828: ICD-10-CM

## 2021-04-16 DIAGNOSIS — T38.0X5A: ICD-10-CM

## 2021-04-16 DIAGNOSIS — J44.1: Primary | ICD-10-CM

## 2021-04-16 DIAGNOSIS — Z90.710: ICD-10-CM

## 2021-04-16 DIAGNOSIS — F31.9: ICD-10-CM

## 2021-04-16 DIAGNOSIS — I10: ICD-10-CM

## 2021-04-16 DIAGNOSIS — E44.0: ICD-10-CM

## 2021-04-16 DIAGNOSIS — Z20.822: ICD-10-CM

## 2021-04-16 DIAGNOSIS — F17.210: ICD-10-CM

## 2021-04-16 DIAGNOSIS — Z87.442: ICD-10-CM

## 2021-04-16 LAB
ALBUMIN SERPL-MCNC: 3.4 G/DL (ref 3.5–5)
ALP SERPL-CCNC: 76 U/L (ref 38–126)
ALT SERPL-CCNC: 16 U/L (ref 4–34)
ANION GAP SERPL CALC-SCNC: 7 MMOL/L
APTT BLD: 23 SEC (ref 22–30)
AST SERPL-CCNC: 26 U/L (ref 14–36)
BASOPHILS # BLD AUTO: 0.1 K/UL (ref 0–0.2)
BASOPHILS NFR BLD AUTO: 1 %
BUN SERPL-SCNC: 21 MG/DL (ref 7–17)
CALCIUM SPEC-MCNC: 8.9 MG/DL (ref 8.4–10.2)
CHLORIDE SERPL-SCNC: 108 MMOL/L (ref 98–107)
CO2 SERPL-SCNC: 26 MMOL/L (ref 22–30)
EOSINOPHIL # BLD AUTO: 0.3 K/UL (ref 0–0.7)
EOSINOPHIL NFR BLD AUTO: 3 %
ERYTHROCYTE [DISTWIDTH] IN BLOOD BY AUTOMATED COUNT: 5.51 M/UL (ref 3.8–5.4)
ERYTHROCYTE [DISTWIDTH] IN BLOOD: 13.3 % (ref 11.5–15.5)
GLUCOSE SERPL-MCNC: 103 MG/DL (ref 74–99)
HCT VFR BLD AUTO: 49.6 % (ref 34–46)
HGB BLD-MCNC: 16.8 GM/DL (ref 11.4–16)
INR PPP: 1.1 (ref ?–1.2)
LYMPHOCYTES # SPEC AUTO: 3.4 K/UL (ref 1–4.8)
LYMPHOCYTES NFR SPEC AUTO: 30 %
MCH RBC QN AUTO: 30.5 PG (ref 25–35)
MCHC RBC AUTO-ENTMCNC: 33.9 G/DL (ref 31–37)
MCV RBC AUTO: 90.1 FL (ref 80–100)
MONOCYTES # BLD AUTO: 0.8 K/UL (ref 0–1)
MONOCYTES NFR BLD AUTO: 8 %
NEUTROPHILS # BLD AUTO: 6.3 K/UL (ref 1.3–7.7)
NEUTROPHILS NFR BLD AUTO: 57 %
PLATELET # BLD AUTO: 182 K/UL (ref 150–450)
POTASSIUM SERPL-SCNC: 4.3 MMOL/L (ref 3.5–5.1)
PROT SERPL-MCNC: 5.9 G/DL (ref 6.3–8.2)
PT BLD: 11.5 SEC (ref 9–12)
SODIUM SERPL-SCNC: 141 MMOL/L (ref 137–145)
WBC # BLD AUTO: 11.1 K/UL (ref 3.8–10.6)

## 2021-04-16 PROCEDURE — 94640 AIRWAY INHALATION TREATMENT: CPT

## 2021-04-16 PROCEDURE — 94760 N-INVAS EAR/PLS OXIMETRY 1: CPT

## 2021-04-16 PROCEDURE — 85610 PROTHROMBIN TIME: CPT

## 2021-04-16 PROCEDURE — 80048 BASIC METABOLIC PNL TOTAL CA: CPT

## 2021-04-16 PROCEDURE — 71046 X-RAY EXAM CHEST 2 VIEWS: CPT

## 2021-04-16 PROCEDURE — 85379 FIBRIN DEGRADATION QUANT: CPT

## 2021-04-16 PROCEDURE — 85025 COMPLETE CBC W/AUTO DIFF WBC: CPT

## 2021-04-16 PROCEDURE — 83605 ASSAY OF LACTIC ACID: CPT

## 2021-04-16 PROCEDURE — 80053 COMPREHEN METABOLIC PANEL: CPT

## 2021-04-16 PROCEDURE — 85730 THROMBOPLASTIN TIME PARTIAL: CPT

## 2021-04-16 PROCEDURE — 83880 ASSAY OF NATRIURETIC PEPTIDE: CPT

## 2021-04-16 PROCEDURE — 93005 ELECTROCARDIOGRAM TRACING: CPT

## 2021-04-16 PROCEDURE — 84484 ASSAY OF TROPONIN QUANT: CPT

## 2021-04-16 PROCEDURE — 87635 SARS-COV-2 COVID-19 AMP PRB: CPT

## 2021-04-16 PROCEDURE — 99285 EMERGENCY DEPT VISIT HI MDM: CPT

## 2021-04-16 PROCEDURE — 36415 COLL VENOUS BLD VENIPUNCTURE: CPT

## 2021-04-16 RX ADMIN — METHYLPREDNISOLONE SODIUM SUCCINATE SCH MG: 125 INJECTION, POWDER, FOR SOLUTION INTRAMUSCULAR; INTRAVENOUS at 23:52

## 2021-04-16 NOTE — P.HPIM
History of Present Illness


H&P Date: 21


Chief Complaint: Shortness of breath


This is 75-year-old white female who reported to the hospital because of 

shortness of breath.  Symptoms got worse in the past few days.  Apparently she 

was treated with oral doxycycline and prednisone 10 days with partial 

improvement.  She has nebulizer at home with partial improvement.  She reports 

dry cough and wheezing.  She denies chest pain, no abdominal pain, no nausea or 

vomiting.  She denies dizziness or loss of consciousness.  She denies diarrhea, 

no hematuria dysuria hematemesis or hematochezia.  She is not on oxygen at home.








Review of Systems


10 systems reviewed, pertinent positive and negative findings as in the HPI.  No

chest pain.  Positive for shortness of breath and cough.








Past Medical History


Past Medical History: Cancer, COPD, GERD/Reflux, Hyperlipidemia, Hypertension, 

Osteoarthritis (OA), Renal Disease, Syncope


Additional Past Medical History / Comment(s): SYNCOPAL EPISODES RECENTLY, 

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

WITH REMOVAL.


History of Any Multi-Drug Resistant Organisms: None Reported


Past Surgical History: Hysterectomy, Tonsillectomy


Additional Past Surgical History / Comment(s): SKIN CANCER REMOVALS, 

COLONOSCOPY.


Past Anesthesia/Blood Transfusion Reactions: No Reported Reaction


Past Psychological History: Bipolar


Smoking Status: Current every day smoker


Past Alcohol Use History: None Reported


Past Drug Use History: None Reported





- Past Family History


  ** Mother


Additional Family Medical History / Comment(s): MOTHER HAD A "BAD HEART."





  ** Father


Additional Family Medical History / Comment(s): FATHER  IN A PLANE CRASH 

EARLIER IN LIFE.





Medications and Allergies


                                Home Medications











 Medication  Instructions  Recorded  Confirmed  Type


 


Budesonide 1 mg INHALATION RT-DAILY 21 History








                                    Allergies











Allergy/AdvReac Type Severity Reaction Status Date / Time


 


No Known Allergies Allergy   Verified 21 15:58














Physical Exam


Vitals: 


                                   Vital Signs











  Temp Pulse Resp BP Pulse Ox


 


 21 14:50  98.1 F  70  26 H  157/79  96








                                Intake and Output











 21





 06:59 14:59 22:59


 


Other:   


 


  Weight  56.699 kg 











Constitutional: No acute distress, conversant, pleasant


Eyes: Anicteric sclerae, moist conjunctiva, no lid-lag, PERRLA


ENMT: NC/AT


Neck:Supple, FROM, no masses, or JVD


Lungs: Decreased breath sounds, no wheezing


Cardiovascular: Heart regular in rate and rhythm,  No murmurs, gallops, or rubs 

no peripheral edema


Abdominal: Soft Nontender, non distended


Skin: Normal temperature


Extremities:No digital cyanosis No clubbing, Pedal pulses intact and  

symmetrical Radial pulses intact and symmetrical Normal gait and station, No 

calf tenderness


 Psychiatric: Alert and oriented to person, place and time, Appropriate affect 

Intact judgement   


Neuro: Muscles Strength 5/5 in all 4 extremities, Sensation to light touch 

grossly present throughout, Cranial nerves II-XII grossly intact








Results


CBC & Chem 7: 


                                 21 15:34





                                 21 15:34


Labs: 


                  Abnormal Lab Results - Last 24 Hours (Table)











  21 Range/Units





  15:34 15:34 


 


WBC  11.1 H   (3.8-10.6)  k/uL


 


RBC  5.51 H   (3.80-5.40)  m/uL


 


Hgb  16.8 H   (11.4-16.0)  gm/dL


 


Hct  49.6 H   (34.0-46.0)  %


 


Chloride   108 H  ()  mmol/L


 


BUN   21 H  (7-17)  mg/dL


 


Creatinine   0.49 L  (0.52-1.04)  mg/dL


 


Glucose   103 H  (74-99)  mg/dL


 


Total Bilirubin   1.5 H  (0.2-1.3)  mg/dL


 


Total Protein   5.9 L  (6.3-8.2)  g/dL


 


Albumin   3.4 L  (3.5-5.0)  g/dL














Assessment and Plan


Plan: 


1.  Acute on chronic COPD exacerbation with hypoxia without respiratory failure:

Oxygen bronchodilators, IV steroids and antibiotics with Rocephin and Zithromax.

 Check cardiac enzymes d-dimer negative and monitor.


2.  Moderate protein calorie malnutrition: BMI 19.6


3.  Tobacco use/dependence without evidence of withdrawal: Nicotine patch as 

indicated


DVT prophylaxis: SCDs


Disposition: Home in 2-3 days

## 2021-04-16 NOTE — XR
EXAMINATION TYPE: XR chest 2V

 

DATE OF EXAM: 4/16/2021

 

COMPARISON: 4/6/2021

 

HISTORY: 75-year-old female shortness of breath, difficulty breathing

 

TECHNIQUE:  AP and lateral views

 

FINDINGS:  

Heart normal size without pericardial effusion. Aorta and pulmonary vasculature within normal limits.
 Hyperinflation. Mild interstitial prominence is unchanged. No consolidation or pleural effusion.

 

 

IMPRESSION:  

 

COPD. No acute process seen.

## 2021-04-16 NOTE — ED
SOB HPI





- General


Chief Complaint: Shortness of Breath


Stated Complaint: KIRTI


Time Seen by Provider: 21 15:11


Source: patient, EMS


Mode of arrival: EMS


Limitations: no limitations





- History of Present Illness


Initial Comments: 





The patient is a 75-year-old female past medical history of COPD who presents to

the emergency department with reported shortness of breath.  Patient was seen in

the emergency department for similar complaint on .  She was diagnosed 

with an acute exacerbation of her COPD was sent home on doxycycline and 

prednisone.  Patient states that she take the medications as directed however 

never improved.  Continues to have a wet cough and shortness of breath.  Patient

does not wear oxygen at home.  States that she stopped smoking yesterday.  

Denies any chest pain or shortness of breath.  No lower external swelling.  No 

history of DVT or PE.  Denies any fevers.  No sick contacts.  Patient has not 

received Covid vaccination no other alleviating, precipitating or modifying 

factors





- Related Data


                                Home Medications











 Medication  Instructions  Recorded  Confirmed


 


Budesonide 1 mg INHALATION RT-DAILY 21








                                  Previous Rx's











 Medication  Instructions  Recorded


 


Amoxic-Pot Clav 875-125Mg 1 tab PO Q12HR 5 Days #10 tab 21





[Augmentin 875-125]  


 


predniSONE 10 mg PO DAILY 5 Days #5 tab 21


 


amLODIPine [Norvasc] 10 mg PO DAILY 30 Days #30 tablet 21











                                    Allergies











Allergy/AdvReac Type Severity Reaction Status Date / Time


 


No Known Allergies Allergy   Verified 21 15:58














Review of Systems


ROS Statement: 


Those systems with pertinent positive or pertinent negative responses have been 

documented in the HPI.





ROS Other: All systems not noted in ROS Statement are negative.





Past Medical History


Past Medical History: Cancer, COPD, GERD/Reflux, Hyperlipidemia, Hypertension, 

Osteoarthritis (OA), Renal Disease, Syncope


Additional Past Medical History / Comment(s): SYNCOPAL EPISODES RECENTLY, 

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

 WITH REMOVAL.


History of Any Multi-Drug Resistant Organisms: None Reported


Past Surgical History: Hysterectomy, Tonsillectomy


Additional Past Surgical History / Comment(s): SKIN CANCER REMOVALS, 

COLONOSCOPY.


Past Anesthesia/Blood Transfusion Reactions: No Reported Reaction


Past Psychological History: Bipolar


Smoking Status: Current every day smoker


Past Alcohol Use History: None Reported


Past Drug Use History: None Reported





- Past Family History


  ** Mother


Additional Family Medical History / Comment(s): MOTHER HAD A "BAD HEART."





  ** Father


Additional Family Medical History / Comment(s): FATHER  IN A PLANE CRASH 

EARLIER IN LIFE.





General Exam


Limitations: no limitations


General appearance: alert, in no apparent distress, other (fatigued)


Head exam: Present: atraumatic, normocephalic, normal inspection


Eye exam: Present: normal appearance, PERRL, EOMI.  Absent: scleral icterus, 

conjunctival injection, periorbital swelling


ENT exam: Present: normal exam, mucous membranes moist


Neck exam: Present: normal inspection.  Absent: tenderness, meningismus, 

lymphadenopathy


Respiratory exam: Present: rales, accessory muscle use, decreased breath sounds,

 other (tachypnia).  Absent: respiratory distress, wheezes, rhonchi, stridor


Cardiovascular Exam: Present: regular rate, normal rhythm, normal heart sounds. 

 Absent: systolic murmur, diastolic murmur, rubs, gallop, clicks


GI/Abdominal exam: Present: soft, normal bowel sounds.  Absent: distended, 

tenderness, guarding, rebound, rigid


Extremities exam: Present: normal inspection, full ROM, normal capillary refill.

  Absent: tenderness, pedal edema, joint swelling, calf tenderness


Back exam: Present: normal inspection


Neurological exam: Present: alert, oriented X3, CN II-XII intact


Psychiatric exam: Present: normal affect, normal mood


Skin exam: Present: warm, dry, intact, normal color.  Absent: rash





Course


                                   Vital Signs











  21





  14:50 16:00 17:10


 


Temperature 98.1 F  


 


Pulse Rate 70  71


 


Respiratory 26 H 24 





Rate   


 


Blood Pressure 157/79  


 


O2 Sat by Pulse 96 97 





Oximetry   














  21





  17:18 17:37 18:30


 


Temperature   


 


Pulse Rate 63 64 70


 


Respiratory  23 21





Rate   


 


Blood Pressure  157/78 


 


O2 Sat by Pulse  97 97





Oximetry   














  21





  19:31 19:46 20:20


 


Temperature  98.7 F 


 


Pulse Rate 65  70


 


Respiratory 22  





Rate   


 


Blood Pressure 149/80  


 


O2 Sat by Pulse 98  96





Oximetry   














  21





  20:30


 


Temperature 


 


Pulse Rate 74


 


Respiratory 





Rate 


 


Blood Pressure 


 


O2 Sat by Pulse 





Oximetry 














Medical Decision Making





- Medical Decision Making





Upon arrival patient was placed into room 26.  A thorough history and physical 

exam was performed.  Patient has increased work of breathing and is only 

saturating 87% on room air.  Patient is placed on 4 L of oxygen with improvement

 in her oxygenation to 97%.  Patient appears sedated.  Laboratory studies are 

conducted.  Cold weight is negative.  Chest x-ray demonstrates no acute process.

  Patient was given a gram of magnesium, a DuoNeb breathing treatment and 125 mg

 of Solu-Medrol.  I did recommend admission as the patient is a 30 failed 

outpatient treatment.  Patient did agree to this.  I spoke with Dr. Batres who 

agreed to admit the patient.  Patient remained in stable condition awaiting a 

bed on the floor





- Lab Data


Result diagrams: 


                                 21 07:24





                                 21 07:24


                                   Lab Results











  21 Range/Units





  15:34 15:34 15:34 


 


WBC     (3.8-10.6)  k/uL


 


RBC     (3.80-5.40)  m/uL


 


Hgb     (11.4-16.0)  gm/dL


 


Hct     (34.0-46.0)  %


 


MCV     (80.0-100.0)  fL


 


MCH     (25.0-35.0)  pg


 


MCHC     (31.0-37.0)  g/dL


 


RDW     (11.5-15.5)  %


 


Plt Count     (150-450)  k/uL


 


MPV     


 


Neutrophils %     %


 


Lymphocytes %     %


 


Monocytes %     %


 


Eosinophils %     %


 


Basophils %     %


 


Neutrophils #     (1.3-7.7)  k/uL


 


Lymphocytes #     (1.0-4.8)  k/uL


 


Monocytes #     (0-1.0)  k/uL


 


Eosinophils #     (0-0.7)  k/uL


 


Basophils #     (0-0.2)  k/uL


 


PT  11.5    (9.0-12.0)  sec


 


INR  1.1    (<1.2)  


 


APTT  23.0    (22.0-30.0)  sec


 


Sodium     (137-145)  mmol/L


 


Potassium     (3.5-5.1)  mmol/L


 


Chloride     ()  mmol/L


 


Carbon Dioxide     (22-30)  mmol/L


 


Anion Gap     mmol/L


 


BUN     (7-17)  mg/dL


 


Creatinine     (0.52-1.04)  mg/dL


 


Est GFR (CKD-EPI)AfAm     (>60 ml/min/1.73 sqM)  


 


Est GFR (CKD-EPI)NonAf     (>60 ml/min/1.73 sqM)  


 


Glucose     (74-99)  mg/dL


 


Plasma Lactic Acid Christian   1.5   (0.7-2.0)  mmol/L


 


Calcium     (8.4-10.2)  mg/dL


 


Total Bilirubin     (0.2-1.3)  mg/dL


 


AST     (14-36)  U/L


 


ALT     (4-34)  U/L


 


Alkaline Phosphatase     ()  U/L


 


Troponin I    0.012  (0.000-0.034)  ng/mL


 


NT-Pro-B Natriuret Pep     pg/mL


 


Total Protein     (6.3-8.2)  g/dL


 


Albumin     (3.5-5.0)  g/dL


 


Coronavirus (PCR)     (Not Detectd)  














  21 Range/Units





  15:34 15:34 15:34 


 


WBC   11.1 H   (3.8-10.6)  k/uL


 


RBC   5.51 H   (3.80-5.40)  m/uL


 


Hgb   16.8 H   (11.4-16.0)  gm/dL


 


Hct   49.6 H   (34.0-46.0)  %


 


MCV   90.1   (80.0-100.0)  fL


 


MCH   30.5   (25.0-35.0)  pg


 


MCHC   33.9   (31.0-37.0)  g/dL


 


RDW   13.3   (11.5-15.5)  %


 


Plt Count   182   (150-450)  k/uL


 


MPV   9.1   


 


Neutrophils %   57   %


 


Lymphocytes %   30   %


 


Monocytes %   8   %


 


Eosinophils %   3   %


 


Basophils %   1   %


 


Neutrophils #   6.3   (1.3-7.7)  k/uL


 


Lymphocytes #   3.4   (1.0-4.8)  k/uL


 


Monocytes #   0.8   (0-1.0)  k/uL


 


Eosinophils #   0.3   (0-0.7)  k/uL


 


Basophils #   0.1   (0-0.2)  k/uL


 


PT     (9.0-12.0)  sec


 


INR     (<1.2)  


 


APTT     (22.0-30.0)  sec


 


Sodium    141  (137-145)  mmol/L


 


Potassium    4.3  (3.5-5.1)  mmol/L


 


Chloride    108 H  ()  mmol/L


 


Carbon Dioxide    26  (22-30)  mmol/L


 


Anion Gap    7  mmol/L


 


BUN    21 H  (7-17)  mg/dL


 


Creatinine    0.49 L  (0.52-1.04)  mg/dL


 


Est GFR (CKD-EPI)AfAm    >90  (>60 ml/min/1.73 sqM)  


 


Est GFR (CKD-EPI)NonAf    >90  (>60 ml/min/1.73 sqM)  


 


Glucose    103 H  (74-99)  mg/dL


 


Plasma Lactic Acid Christian     (0.7-2.0)  mmol/L


 


Calcium    8.9  (8.4-10.2)  mg/dL


 


Total Bilirubin    1.5 H  (0.2-1.3)  mg/dL


 


AST    26  (14-36)  U/L


 


ALT    16  (4-34)  U/L


 


Alkaline Phosphatase    76  ()  U/L


 


Troponin I     (0.000-0.034)  ng/mL


 


NT-Pro-B Natriuret Pep  357    pg/mL


 


Total Protein    5.9 L  (6.3-8.2)  g/dL


 


Albumin    3.4 L  (3.5-5.0)  g/dL


 


Coronavirus (PCR)     (Not Detectd)  














  21 Range/Units





  15:34 


 


WBC   (3.8-10.6)  k/uL


 


RBC   (3.80-5.40)  m/uL


 


Hgb   (11.4-16.0)  gm/dL


 


Hct   (34.0-46.0)  %


 


MCV   (80.0-100.0)  fL


 


MCH   (25.0-35.0)  pg


 


MCHC   (31.0-37.0)  g/dL


 


RDW   (11.5-15.5)  %


 


Plt Count   (150-450)  k/uL


 


MPV   


 


Neutrophils %   %


 


Lymphocytes %   %


 


Monocytes %   %


 


Eosinophils %   %


 


Basophils %   %


 


Neutrophils #   (1.3-7.7)  k/uL


 


Lymphocytes #   (1.0-4.8)  k/uL


 


Monocytes #   (0-1.0)  k/uL


 


Eosinophils #   (0-0.7)  k/uL


 


Basophils #   (0-0.2)  k/uL


 


PT   (9.0-12.0)  sec


 


INR   (<1.2)  


 


APTT   (22.0-30.0)  sec


 


Sodium   (137-145)  mmol/L


 


Potassium   (3.5-5.1)  mmol/L


 


Chloride   ()  mmol/L


 


Carbon Dioxide   (22-30)  mmol/L


 


Anion Gap   mmol/L


 


BUN   (7-17)  mg/dL


 


Creatinine   (0.52-1.04)  mg/dL


 


Est GFR (CKD-EPI)AfAm   (>60 ml/min/1.73 sqM)  


 


Est GFR (CKD-EPI)NonAf   (>60 ml/min/1.73 sqM)  


 


Glucose   (74-99)  mg/dL


 


Plasma Lactic Acid Christian   (0.7-2.0)  mmol/L


 


Calcium   (8.4-10.2)  mg/dL


 


Total Bilirubin   (0.2-1.3)  mg/dL


 


AST   (14-36)  U/L


 


ALT   (4-34)  U/L


 


Alkaline Phosphatase   ()  U/L


 


Troponin I   (0.000-0.034)  ng/mL


 


NT-Pro-B Natriuret Pep   pg/mL


 


Total Protein   (6.3-8.2)  g/dL


 


Albumin   (3.5-5.0)  g/dL


 


Coronavirus (PCR)  Not Detected  (Not Detectd)  














- EKG Data


EKG Comments: 





EKG demonstrates normal sinus rhythm with a ventricular rate of 62.  MA interval

 160.  .  .  There is a right bundle branch block.  No acute ST 

segment elevations or depressions





Disposition


Clinical Impression: 


 COPD exacerbation, Hypoxia





Disposition: ADMITTED AS IP TO THIS HOSP


Condition: Stable


Is patient prescribed a controlled substance at d/c from ED?: No


Decision to Admit Reason: Admit from EC


Decision Date: 21


Decision Time: 16:54

## 2021-04-17 LAB
ALBUMIN SERPL-MCNC: 3 G/DL (ref 3.5–5)
ALBUMIN SERPL-MCNC: 3.1 G/DL (ref 3.5–5)
ALP SERPL-CCNC: 63 U/L (ref 38–126)
ALP SERPL-CCNC: 72 U/L (ref 38–126)
ALT SERPL-CCNC: 15 U/L (ref 4–34)
ALT SERPL-CCNC: 17 U/L (ref 4–34)
ANION GAP SERPL CALC-SCNC: 6 MMOL/L
ANION GAP SERPL CALC-SCNC: 7 MMOL/L
ANION GAP SERPL CALC-SCNC: 9 MMOL/L
AST SERPL-CCNC: 22 U/L (ref 14–36)
AST SERPL-CCNC: 25 U/L (ref 14–36)
BASOPHILS # BLD AUTO: 0 K/UL (ref 0–0.2)
BASOPHILS NFR BLD AUTO: 0 %
BUN SERPL-SCNC: 20 MG/DL (ref 7–17)
BUN SERPL-SCNC: 23 MG/DL (ref 7–17)
BUN SERPL-SCNC: 23 MG/DL (ref 7–17)
CALCIUM SPEC-MCNC: 8.6 MG/DL (ref 8.4–10.2)
CALCIUM SPEC-MCNC: 9 MG/DL (ref 8.4–10.2)
CALCIUM SPEC-MCNC: 9.1 MG/DL (ref 8.4–10.2)
CHLORIDE SERPL-SCNC: 108 MMOL/L (ref 98–107)
CHLORIDE SERPL-SCNC: 108 MMOL/L (ref 98–107)
CHLORIDE SERPL-SCNC: 109 MMOL/L (ref 98–107)
CO2 SERPL-SCNC: 24 MMOL/L (ref 22–30)
CO2 SERPL-SCNC: 26 MMOL/L (ref 22–30)
CO2 SERPL-SCNC: 26 MMOL/L (ref 22–30)
EOSINOPHIL # BLD AUTO: 0 K/UL (ref 0–0.7)
EOSINOPHIL NFR BLD AUTO: 0 %
ERYTHROCYTE [DISTWIDTH] IN BLOOD BY AUTOMATED COUNT: 5.3 M/UL (ref 3.8–5.4)
ERYTHROCYTE [DISTWIDTH] IN BLOOD: 13.5 % (ref 11.5–15.5)
GLUCOSE SERPL-MCNC: 202 MG/DL (ref 74–99)
GLUCOSE SERPL-MCNC: 213 MG/DL (ref 74–99)
GLUCOSE SERPL-MCNC: 213 MG/DL (ref 74–99)
HCT VFR BLD AUTO: 48.5 % (ref 34–46)
HGB BLD-MCNC: 15.9 GM/DL (ref 11.4–16)
LYMPHOCYTES # SPEC AUTO: 2 K/UL (ref 1–4.8)
LYMPHOCYTES NFR SPEC AUTO: 13 %
MCH RBC QN AUTO: 29.9 PG (ref 25–35)
MCHC RBC AUTO-ENTMCNC: 32.7 G/DL (ref 31–37)
MCV RBC AUTO: 91.6 FL (ref 80–100)
MONOCYTES # BLD AUTO: 0.3 K/UL (ref 0–1)
MONOCYTES NFR BLD AUTO: 2 %
NEUTROPHILS # BLD AUTO: 13.1 K/UL (ref 1.3–7.7)
NEUTROPHILS NFR BLD AUTO: 85 %
PLATELET # BLD AUTO: 202 K/UL (ref 150–450)
POTASSIUM SERPL-SCNC: 3.9 MMOL/L (ref 3.5–5.1)
POTASSIUM SERPL-SCNC: 3.9 MMOL/L (ref 3.5–5.1)
POTASSIUM SERPL-SCNC: 4.2 MMOL/L (ref 3.5–5.1)
PROT SERPL-MCNC: 5.3 G/DL (ref 6.3–8.2)
PROT SERPL-MCNC: 5.4 G/DL (ref 6.3–8.2)
SODIUM SERPL-SCNC: 138 MMOL/L (ref 137–145)
SODIUM SERPL-SCNC: 142 MMOL/L (ref 137–145)
SODIUM SERPL-SCNC: 143 MMOL/L (ref 137–145)
WBC # BLD AUTO: 15.5 K/UL (ref 3.8–10.6)

## 2021-04-17 RX ADMIN — BUDESONIDE SCH MG: 1 SUSPENSION RESPIRATORY (INHALATION) at 09:02

## 2021-04-17 RX ADMIN — IPRATROPIUM BROMIDE AND ALBUTEROL SULFATE SCH ML: .5; 3 SOLUTION RESPIRATORY (INHALATION) at 20:47

## 2021-04-17 RX ADMIN — IPRATROPIUM BROMIDE AND ALBUTEROL SULFATE SCH ML: .5; 3 SOLUTION RESPIRATORY (INHALATION) at 09:01

## 2021-04-17 RX ADMIN — METHYLPREDNISOLONE SODIUM SUCCINATE SCH MG: 125 INJECTION, POWDER, FOR SOLUTION INTRAMUSCULAR; INTRAVENOUS at 23:11

## 2021-04-17 RX ADMIN — METHYLPREDNISOLONE SODIUM SUCCINATE SCH MG: 125 INJECTION, POWDER, FOR SOLUTION INTRAMUSCULAR; INTRAVENOUS at 07:51

## 2021-04-17 RX ADMIN — IPRATROPIUM BROMIDE AND ALBUTEROL SULFATE SCH ML: .5; 3 SOLUTION RESPIRATORY (INHALATION) at 16:11

## 2021-04-17 RX ADMIN — METHYLPREDNISOLONE SODIUM SUCCINATE SCH MG: 125 INJECTION, POWDER, FOR SOLUTION INTRAMUSCULAR; INTRAVENOUS at 16:32

## 2021-04-17 RX ADMIN — IPRATROPIUM BROMIDE AND ALBUTEROL SULFATE SCH ML: .5; 3 SOLUTION RESPIRATORY (INHALATION) at 13:04

## 2021-04-17 NOTE — P.PN
Subjective


Progress Note Date: 04/17/21


Feels better, shortness has improved.  Cough is better.  No chest pain no 

abdominal pain








Objective





- Vital Signs


Vital signs: 


                                   Vital Signs











Temp  98.0 F   04/17/21 01:36


 


Pulse  73   04/17/21 01:36


 


Resp  28 H  04/17/21 01:36


 


BP  152/67   04/17/21 01:36


 


Pulse Ox  95   04/17/21 01:36








                                 Intake & Output











 04/16/21 04/17/21 04/17/21





 18:59 06:59 18:59


 


Intake Total  250 


 


Balance  250 


 


Weight 56.699 kg 56.699 kg 


 


Intake:   


 


  Oral  250 














- Exam


Constitutional: No acute distress, conversant, pleasant


Eyes: Anicteric sclerae, moist conjunctiva


ENMT: NC/AT


Neck:Supple, FROM


Lungs: Decreased breath sounds, no wheezing


Cardiovascular: Heart regular in rate and rhythm,  No murmurs, gallops, or rubs 

no peripheral edema


Abdominal: Soft Nontender, non distended


Skin: Normal temperature


Extremities:No digital cyanosis No clubbing, Pedal pulses intact and  

symmetrical Radial pulses intact and symmetrical Normal gait and station, No 

calf tenderness


 Psychiatric: Alert and oriented to person, place and time, Appropriate affect 

Intact judgement   


Neuro: Muscles Strength 5/5 in all 4 extremities, Sensation to light touch 

grossly present throughout, Cranial nerves II-XII grossly intact








- Labs


CBC & Chem 7: 


                                 04/16/21 15:34





                                 04/16/21 22:42


Labs: 


                  Abnormal Lab Results - Last 24 Hours (Table)











  04/16/21 04/16/21 04/16/21 Range/Units





  15:34 15:34 22:42 


 


WBC  11.1 H    (3.8-10.6)  k/uL


 


RBC  5.51 H    (3.80-5.40)  m/uL


 


Hgb  16.8 H    (11.4-16.0)  gm/dL


 


Hct  49.6 H    (34.0-46.0)  %


 


Chloride   108 H  108 H  ()  mmol/L


 


BUN   21 H  20 H  (7-17)  mg/dL


 


Creatinine   0.49 L  0.50 L  (0.52-1.04)  mg/dL


 


Glucose   103 H  202 H  (74-99)  mg/dL


 


Total Bilirubin   1.5 H   (0.2-1.3)  mg/dL


 


Total Protein   5.9 L  5.3 L  (6.3-8.2)  g/dL


 


Albumin   3.4 L  3.0 L  (3.5-5.0)  g/dL














Assessment and Plan


Plan: 


1.  Acute on chronic COPD exacerbation with hypoxia without respiratory failure:

Oxygen bronchodilators, IV steroids and antibiotics with Rocephin and Zithromax.

 Now on room air.


2.  Moderate protein calorie malnutrition: BMI 19.6


3.  Tobacco use/dependence without evidence of withdrawal: Nicotine patch as 

indicated


DVT prophylaxis: SCDs


Disposition: Home in 1-2 days

## 2021-04-18 LAB
ALBUMIN SERPL-MCNC: 3 G/DL (ref 3.5–5)
ALP SERPL-CCNC: 76 U/L (ref 38–126)
ALT SERPL-CCNC: 17 U/L (ref 4–34)
ANION GAP SERPL CALC-SCNC: 2 MMOL/L
AST SERPL-CCNC: 24 U/L (ref 14–36)
BASOPHILS # BLD AUTO: 0 K/UL (ref 0–0.2)
BASOPHILS NFR BLD AUTO: 0 %
BUN SERPL-SCNC: 29 MG/DL (ref 7–17)
CALCIUM SPEC-MCNC: 9.2 MG/DL (ref 8.4–10.2)
CHLORIDE SERPL-SCNC: 109 MMOL/L (ref 98–107)
CO2 SERPL-SCNC: 27 MMOL/L (ref 22–30)
EOSINOPHIL # BLD AUTO: 0 K/UL (ref 0–0.7)
EOSINOPHIL NFR BLD AUTO: 0 %
ERYTHROCYTE [DISTWIDTH] IN BLOOD BY AUTOMATED COUNT: 5.08 M/UL (ref 3.8–5.4)
ERYTHROCYTE [DISTWIDTH] IN BLOOD: 13.6 % (ref 11.5–15.5)
GLUCOSE SERPL-MCNC: 125 MG/DL (ref 74–99)
HCT VFR BLD AUTO: 45.8 % (ref 34–46)
HGB BLD-MCNC: 15.2 GM/DL (ref 11.4–16)
LYMPHOCYTES # SPEC AUTO: 1.9 K/UL (ref 1–4.8)
LYMPHOCYTES NFR SPEC AUTO: 7 %
MCH RBC QN AUTO: 30 PG (ref 25–35)
MCHC RBC AUTO-ENTMCNC: 33.2 G/DL (ref 31–37)
MCV RBC AUTO: 90.3 FL (ref 80–100)
MONOCYTES # BLD AUTO: 0.6 K/UL (ref 0–1)
MONOCYTES NFR BLD AUTO: 2 %
NEUTROPHILS # BLD AUTO: 23.3 K/UL (ref 1.3–7.7)
NEUTROPHILS NFR BLD AUTO: 90 %
PLATELET # BLD AUTO: 202 K/UL (ref 150–450)
POTASSIUM SERPL-SCNC: 4.2 MMOL/L (ref 3.5–5.1)
PROT SERPL-MCNC: 5.4 G/DL (ref 6.3–8.2)
SODIUM SERPL-SCNC: 138 MMOL/L (ref 137–145)
WBC # BLD AUTO: 25.9 K/UL (ref 3.8–10.6)

## 2021-04-18 RX ADMIN — BUDESONIDE SCH MG: 1 SUSPENSION RESPIRATORY (INHALATION) at 07:07

## 2021-04-18 RX ADMIN — IPRATROPIUM BROMIDE AND ALBUTEROL SULFATE SCH ML: .5; 3 SOLUTION RESPIRATORY (INHALATION) at 11:12

## 2021-04-18 RX ADMIN — METHYLPREDNISOLONE SODIUM SUCCINATE SCH MG: 125 INJECTION, POWDER, FOR SOLUTION INTRAMUSCULAR; INTRAVENOUS at 15:39

## 2021-04-18 RX ADMIN — METHYLPREDNISOLONE SODIUM SUCCINATE SCH MG: 125 INJECTION, POWDER, FOR SOLUTION INTRAMUSCULAR; INTRAVENOUS at 07:03

## 2021-04-18 RX ADMIN — IPRATROPIUM BROMIDE AND ALBUTEROL SULFATE SCH ML: .5; 3 SOLUTION RESPIRATORY (INHALATION) at 15:51

## 2021-04-18 RX ADMIN — AZITHROMYCIN SCH MG: 500 TABLET, FILM COATED ORAL at 17:32

## 2021-04-18 RX ADMIN — IPRATROPIUM BROMIDE AND ALBUTEROL SULFATE SCH ML: .5; 3 SOLUTION RESPIRATORY (INHALATION) at 19:50

## 2021-04-18 RX ADMIN — IPRATROPIUM BROMIDE AND ALBUTEROL SULFATE SCH ML: .5; 3 SOLUTION RESPIRATORY (INHALATION) at 07:07

## 2021-04-18 NOTE — P.PN
Subjective


Progress Note Date: 04/18/21


Feels better, no chest pain.  Shortness of breath improving.  Cough improved.  

No wheezing.  Still requiring some oxygen 1-2 L





Objective





- Vital Signs


Vital signs: 


                                   Vital Signs











Temp  97.2 F L  04/18/21 02:13


 


Pulse  78   04/18/21 08:01


 


Resp  17   04/18/21 02:13


 


BP  177/78   04/18/21 08:01


 


Pulse Ox  94 L  04/18/21 08:01








                                 Intake & Output











 04/17/21 04/18/21 04/18/21





 18:59 06:59 18:59


 


Intake Total 1710  


 


Balance 1710  


 


Intake:   


 


  Intake, IV Titration 300  





  Amount   


 


    Azithromycin 500 mg In 250  





    Sodium Chloride 0.9% 250   





    ml @ 250 mls/hr IVPB Q24H   





    [facility] Rx#:[id number]   


 


    cefTRIAXone 1 gm In 50  





    Sodium Chloride 0.9% 50   





    ml @ 100 mls/hr IVPB   





    Q24HR [facility] Rx#:[id number]   


 


  Oral 1410  


 


Other:   


 


  Voiding Method Toilet Toilet 


 


  # Voids 4 1 


 


  # Bowel Movements  0 














- Exam


Constitutional: No acute distress, in bed.


Eyes: Anicteric sclerae, moist conjunctiva


ENMT: NC/AT


Neck:Supple, FROM


Lungs: Decreased breath sounds, no wheezing


Cardiovascular: Heart regular in rate and rhythm,  No murmurs, gallops, or rubs 


Abdominal: Soft Nontender, non distended


Extremities:No digital cyanosis No clubbing, Pedal pulses intact and  

symmetrical Radial pulses intact and symmetrical Normal gait and station, No 

calf tenderness


 Psychiatric: Alert and oriented to person, place and time, Appropriate affect 

Intact judgement   


Neuro: Muscles Strength 5/5 in all 4 extremities, Sensation to light touch 

grossly present throughout, Cranial nerves II-XII grossly intact








- Labs


CBC & Chem 7: 


                                 04/17/21 10:17





                                 04/17/21 10:17


Labs: 


                  Abnormal Lab Results - Last 24 Hours (Table)











  04/17/21 04/17/21 04/17/21 Range/Units





  10:17 10:17 10:17 


 


WBC  15.5 H    (3.8-10.6)  k/uL


 


Hct  48.5 H    (34.0-46.0)  %


 


Neutrophils #  13.1 H    (1.3-7.7)  k/uL


 


Chloride   109 H  108 H  ()  mmol/L


 


BUN   23 H  23 H  (7-17)  mg/dL


 


Glucose   213 H  213 H  (74-99)  mg/dL


 


Total Protein    5.4 L  (6.3-8.2)  g/dL


 


Albumin    3.1 L  (3.5-5.0)  g/dL














Assessment and Plan


Plan: 


1.  Acute on chronic COPD exacerbation with hypoxia without respiratory failure:

Oxygen bronchodilators, IV steroids and antibiotics with Rocephin and Zithromax.

 Titrate oxygen down as indicated.


2.  Moderate protein calorie malnutrition: BMI 19.6


3.  Tobacco use/dependence without evidence of withdrawal: Nicotine patch as 

indicated


4.  Mild leukocytosis likely secondary to being on steroids: WBC 14,000, 

monitor.


DVT prophylaxis: SCDs


Disposition: Home likely tomorrow

## 2021-04-19 LAB
ALBUMIN SERPL-MCNC: 2.9 G/DL (ref 3.5–5)
ALP SERPL-CCNC: 79 U/L (ref 38–126)
ALT SERPL-CCNC: 27 U/L (ref 4–34)
ANION GAP SERPL CALC-SCNC: 2 MMOL/L
AST SERPL-CCNC: 29 U/L (ref 14–36)
BASOPHILS # BLD AUTO: 0 K/UL (ref 0–0.2)
BASOPHILS NFR BLD AUTO: 0 %
BUN SERPL-SCNC: 24 MG/DL (ref 7–17)
CALCIUM SPEC-MCNC: 8.9 MG/DL (ref 8.4–10.2)
CHLORIDE SERPL-SCNC: 108 MMOL/L (ref 98–107)
CO2 SERPL-SCNC: 29 MMOL/L (ref 22–30)
EOSINOPHIL # BLD AUTO: 0 K/UL (ref 0–0.7)
EOSINOPHIL NFR BLD AUTO: 0 %
ERYTHROCYTE [DISTWIDTH] IN BLOOD BY AUTOMATED COUNT: 5.17 M/UL (ref 3.8–5.4)
ERYTHROCYTE [DISTWIDTH] IN BLOOD: 13.7 % (ref 11.5–15.5)
GLUCOSE SERPL-MCNC: 123 MG/DL (ref 74–99)
HCT VFR BLD AUTO: 46.8 % (ref 34–46)
HGB BLD-MCNC: 15.7 GM/DL (ref 11.4–16)
LYMPHOCYTES # SPEC AUTO: 2.3 K/UL (ref 1–4.8)
LYMPHOCYTES NFR SPEC AUTO: 11 %
MCH RBC QN AUTO: 30.3 PG (ref 25–35)
MCHC RBC AUTO-ENTMCNC: 33.5 G/DL (ref 31–37)
MCV RBC AUTO: 90.6 FL (ref 80–100)
MONOCYTES # BLD AUTO: 0.3 K/UL (ref 0–1)
MONOCYTES NFR BLD AUTO: 1 %
NEUTROPHILS # BLD AUTO: 18.3 K/UL (ref 1.3–7.7)
NEUTROPHILS NFR BLD AUTO: 87 %
PLATELET # BLD AUTO: 207 K/UL (ref 150–450)
POTASSIUM SERPL-SCNC: 4.3 MMOL/L (ref 3.5–5.1)
PROT SERPL-MCNC: 5.2 G/DL (ref 6.3–8.2)
SODIUM SERPL-SCNC: 139 MMOL/L (ref 137–145)
WBC # BLD AUTO: 21 K/UL (ref 3.8–10.6)

## 2021-04-19 RX ADMIN — IPRATROPIUM BROMIDE AND ALBUTEROL SULFATE SCH ML: .5; 3 SOLUTION RESPIRATORY (INHALATION) at 11:13

## 2021-04-19 RX ADMIN — METHYLPREDNISOLONE SODIUM SUCCINATE SCH MG: 125 INJECTION, POWDER, FOR SOLUTION INTRAMUSCULAR; INTRAVENOUS at 16:49

## 2021-04-19 RX ADMIN — BUDESONIDE SCH MG: 1 SUSPENSION RESPIRATORY (INHALATION) at 11:13

## 2021-04-19 RX ADMIN — IPRATROPIUM BROMIDE AND ALBUTEROL SULFATE SCH: .5; 3 SOLUTION RESPIRATORY (INHALATION) at 07:56

## 2021-04-19 RX ADMIN — IPRATROPIUM BROMIDE AND ALBUTEROL SULFATE SCH ML: .5; 3 SOLUTION RESPIRATORY (INHALATION) at 16:43

## 2021-04-19 RX ADMIN — AZITHROMYCIN SCH MG: 500 TABLET, FILM COATED ORAL at 17:22

## 2021-04-19 RX ADMIN — AMOXICILLIN AND CLAVULANATE POTASSIUM SCH EACH: 875; 125 TABLET, FILM COATED ORAL at 20:03

## 2021-04-19 RX ADMIN — METHYLPREDNISOLONE SODIUM SUCCINATE SCH MG: 125 INJECTION, POWDER, FOR SOLUTION INTRAMUSCULAR; INTRAVENOUS at 09:48

## 2021-04-19 RX ADMIN — METHYLPREDNISOLONE SODIUM SUCCINATE SCH MG: 125 INJECTION, POWDER, FOR SOLUTION INTRAMUSCULAR; INTRAVENOUS at 00:53

## 2021-04-19 RX ADMIN — IPRATROPIUM BROMIDE AND ALBUTEROL SULFATE SCH ML: .5; 3 SOLUTION RESPIRATORY (INHALATION) at 20:35

## 2021-04-19 NOTE — P.DS
Providers


Date of admission: 


04/16/21 17:00





Attending physician: 


Braulio Batres MD





Primary care physician: 


Duc ZELYAA Alison





[facility] Course: 


HPI: 


Chief Complaint: Shortness of breath


This is 75-year-old white female who reported to the hospital because of 

shortness of breath.  Symptoms got worse in the past few days.  Apparently she 

was treated with oral doxycycline and prednisone 10 days with partial 

improvement.  She has nebulizer at home with partial improvement.  She reports 

dry cough and wheezing.  She denies chest pain, no abdominal pain, no nausea or 

vomiting.  She denies dizziness or loss of consciousness.  She denies diarrhea, 

no hematuria dysuria hematemesis or hematochezia.  She is not on oxygen at home.





Hospital course and treatment:


Patient was admitted to the hospital with shortness of breath, she was found to 

have acute on chronic COPD exacerbation.  She required oxygen during her 

hospitalization but oxygen has been titrated down.  We are evaluating for home 

oxygen requirement.  She was treated with oxygen bronchodilators IV steroids and

antibiotics with Rocephin and Zithromax.  She continued to gradually improve and

by the time of discharge she feels much better.  She will be discharged home on 

prednisone and Augmentin





Diagnoses upon discharge:


1.  Acute on chronic COPD exacerbation with hypoxia without respiratory failure


2.  Moderate protein calorie malnutrition BMI 19.6


3.  Tobacco use/dependence without evidence of withdrawal


4. Leukocytoses likely associated with being on steroids


Patient Condition at Discharge: Stable





Plan - Discharge Summary


Discharge Rx Participant: No


New Discharge Prescriptions: 


New


   Amoxic-Pot Clav 875-125Mg [Augmentin 875-125] 1 tab PO Q12HR 5 Days #10 tab


   predniSONE 10 mg PO DAILY 5 Days #5 tab





No Action


   Budesonide 1 mg INHALATION RT-DAILY


Discharge Medication List





Budesonide 1 mg INHALATION RT-DAILY 04/06/21 [History]


Amoxic-Pot Clav 875-125Mg [Augmentin 875-125] 1 tab PO Q12HR 5 Days #10 tab 

04/19/21 [Rx]


predniSONE 10 mg PO DAILY 5 Days #5 tab 04/19/21 [Rx]








Follow up Appointment(s)/Referral(s): 


Duc Rosas MD [Primary Care Provider] - 1-2 days


Discharge Disposition: HOME SELF-CARE

## 2021-04-19 NOTE — P.PN
Subjective


Progress Note Date: 04/19/21


Feels better, no chest pain no abdominal pain no nausea no vomiting.  No 

shortness of breath.  Not requiring oxygen.





Objective





- Vital Signs


Vital signs: 


                                   Vital Signs











Temp  97 F L  04/19/21 07:50


 


Pulse  56 L  04/19/21 11:14


 


Resp  18   04/19/21 07:50


 


BP  174/77   04/19/21 09:00


 


Pulse Ox  95   04/19/21 07:50








                                 Intake & Output











 04/18/21 04/19/21 04/19/21





 18:59 06:59 18:59


 


Intake Total 1230  


 


Output Total 1  


 


Balance 1229  


 


Intake:   


 


  Intake, IV Titration 50  





  Amount   


 


    cefTRIAXone 1 gm In 50  





    Sodium Chloride 0.9% 50   





    ml @ 100 mls/hr IVPB   





    Q24HR [facility] Rx#:[id number]   


 


  Oral 1180  


 


Output:   


 


  Stool 1  


 


Other:   


 


  # Voids 2 1 


 


  # Bowel Movements 0  














- Exam


Constitutional: No acute distress, in bed.


Eyes: Anicteric sclerae, moist conjunctiva


ENMT: NC/AT


Neck:Supple, FROM


Lungs: Decreased breath sounds, no wheezing


Cardiovascular: S1S2  No murmurs, gallops, or rubs 


Abdominal: Soft Nontender, non distended


Extremities:No digital cyanosis No clubbing, Pedal pulses intact and  

symmetrical Radial pulses intact and symmetrical Normal gait and station, No 

calf tenderness


 Psychiatric: Alert and oriented to person, place and time, Appropriate affect 

Intact judgement   


Neuro: Muscles Strength 5/5 in all 4 extremities, Cranial nerves II-XII grossly 

intact








- Labs


CBC & Chem 7: 


                                 04/19/21 07:33





                                 04/19/21 07:33


Labs: 


                  Abnormal Lab Results - Last 24 Hours (Table)











  04/18/21 04/18/21 04/19/21 Range/Units





  10:39 10:39 07:33 


 


WBC    21.0 H  (3.8-10.6)  k/uL


 


Hct    46.8 H  (34.0-46.0)  %


 


Neutrophils #    18.3 H  (1.3-7.7)  k/uL


 


D-Dimer   0.64 H   (<0.60)  mg/L FEU


 


Chloride  109 H    ()  mmol/L


 


BUN  29 H    (7-17)  mg/dL


 


Glucose  125 H    (74-99)  mg/dL


 


Total Protein  5.4 L    (6.3-8.2)  g/dL


 


Albumin  3.0 L    (3.5-5.0)  g/dL














  04/19/21 Range/Units





  07:33 


 


WBC   (3.8-10.6)  k/uL


 


Hct   (34.0-46.0)  %


 


Neutrophils #   (1.3-7.7)  k/uL


 


D-Dimer   (<0.60)  mg/L FEU


 


Chloride  108 H  ()  mmol/L


 


BUN  24 H  (7-17)  mg/dL


 


Glucose  123 H  (74-99)  mg/dL


 


Total Protein  5.2 L  (6.3-8.2)  g/dL


 


Albumin  2.9 L  (3.5-5.0)  g/dL














Assessment and Plan


Plan: 


1.  Acute on chronic COPD exacerbation with hypoxia without respiratory failure:

Oxygen bronchodilators, IV steroids and antibiotics with Rocephin and Zithromax.

 Titrate oxygen down as indicated.  Currently not requiring oxygen


2.  Moderate protein calorie malnutrition: BMI 19.6


3.  Tobacco use/dependence without evidence of withdrawal: Nicotine patch as 

indicated


4. leukocytosis likely secondary to being on steroids: WBC peaked to 25,000, 

currently down to 21,000.


5.  Essential hypertension: Start Norvasc


DVT prophylaxis: SCDs


Disposition: Home likely tomorrow

## 2021-04-20 VITALS — HEART RATE: 72 BPM

## 2021-04-20 VITALS — SYSTOLIC BLOOD PRESSURE: 163 MMHG | TEMPERATURE: 98.3 F | RESPIRATION RATE: 16 BRPM | DIASTOLIC BLOOD PRESSURE: 82 MMHG

## 2021-04-20 LAB
ALBUMIN SERPL-MCNC: 2.9 G/DL (ref 3.5–5)
ALP SERPL-CCNC: 81 U/L (ref 38–126)
ALT SERPL-CCNC: 26 U/L (ref 4–34)
ANION GAP SERPL CALC-SCNC: 3 MMOL/L
AST SERPL-CCNC: 27 U/L (ref 14–36)
BASOPHILS # BLD AUTO: 0 K/UL (ref 0–0.2)
BASOPHILS NFR BLD AUTO: 0 %
BUN SERPL-SCNC: 27 MG/DL (ref 7–17)
CALCIUM SPEC-MCNC: 9 MG/DL (ref 8.4–10.2)
CHLORIDE SERPL-SCNC: 108 MMOL/L (ref 98–107)
CO2 SERPL-SCNC: 27 MMOL/L (ref 22–30)
EOSINOPHIL # BLD AUTO: 0.1 K/UL (ref 0–0.7)
EOSINOPHIL NFR BLD AUTO: 0 %
ERYTHROCYTE [DISTWIDTH] IN BLOOD BY AUTOMATED COUNT: 5.34 M/UL (ref 3.8–5.4)
ERYTHROCYTE [DISTWIDTH] IN BLOOD: 13.6 % (ref 11.5–15.5)
GLUCOSE SERPL-MCNC: 108 MG/DL (ref 74–99)
HCT VFR BLD AUTO: 48.3 % (ref 34–46)
HGB BLD-MCNC: 16.6 GM/DL (ref 11.4–16)
LYMPHOCYTES # SPEC AUTO: 2.6 K/UL (ref 1–4.8)
LYMPHOCYTES NFR SPEC AUTO: 13 %
MCH RBC QN AUTO: 31 PG (ref 25–35)
MCHC RBC AUTO-ENTMCNC: 34.3 G/DL (ref 31–37)
MCV RBC AUTO: 90.3 FL (ref 80–100)
MONOCYTES # BLD AUTO: 0.9 K/UL (ref 0–1)
MONOCYTES NFR BLD AUTO: 5 %
NEUTROPHILS # BLD AUTO: 16 K/UL (ref 1.3–7.7)
NEUTROPHILS NFR BLD AUTO: 81 %
PLATELET # BLD AUTO: 188 K/UL (ref 150–450)
POTASSIUM SERPL-SCNC: 4.2 MMOL/L (ref 3.5–5.1)
PROT SERPL-MCNC: 5.3 G/DL (ref 6.3–8.2)
SODIUM SERPL-SCNC: 138 MMOL/L (ref 137–145)
WBC # BLD AUTO: 19.7 K/UL (ref 3.8–10.6)

## 2021-04-20 RX ADMIN — AMOXICILLIN AND CLAVULANATE POTASSIUM SCH EACH: 875; 125 TABLET, FILM COATED ORAL at 08:11

## 2021-04-20 RX ADMIN — IPRATROPIUM BROMIDE AND ALBUTEROL SULFATE SCH ML: .5; 3 SOLUTION RESPIRATORY (INHALATION) at 11:33

## 2021-04-20 RX ADMIN — BUDESONIDE SCH: 1 SUSPENSION RESPIRATORY (INHALATION) at 08:08

## 2021-04-20 RX ADMIN — IPRATROPIUM BROMIDE AND ALBUTEROL SULFATE SCH: .5; 3 SOLUTION RESPIRATORY (INHALATION) at 08:08

## 2021-05-27 ENCOUNTER — HOSPITAL ENCOUNTER (EMERGENCY)
Dept: HOSPITAL 47 - EC | Age: 76
Discharge: HOME | End: 2021-05-27
Payer: MEDICARE

## 2021-05-27 VITALS — HEART RATE: 88 BPM | RESPIRATION RATE: 16 BRPM

## 2021-05-27 VITALS — SYSTOLIC BLOOD PRESSURE: 128 MMHG | DIASTOLIC BLOOD PRESSURE: 68 MMHG

## 2021-05-27 VITALS — TEMPERATURE: 98.8 F

## 2021-05-27 DIAGNOSIS — Z90.710: ICD-10-CM

## 2021-05-27 DIAGNOSIS — F17.200: ICD-10-CM

## 2021-05-27 DIAGNOSIS — E78.5: ICD-10-CM

## 2021-05-27 DIAGNOSIS — M19.90: ICD-10-CM

## 2021-05-27 DIAGNOSIS — F31.9: Primary | ICD-10-CM

## 2021-05-27 DIAGNOSIS — Z90.09: ICD-10-CM

## 2021-05-27 DIAGNOSIS — I10: ICD-10-CM

## 2021-05-27 LAB
ALBUMIN SERPL-MCNC: 3.8 G/DL (ref 3.5–5)
ALP SERPL-CCNC: 90 U/L (ref 38–126)
ALT SERPL-CCNC: 25 U/L (ref 4–34)
ANION GAP SERPL CALC-SCNC: 6 MMOL/L
AST SERPL-CCNC: 31 U/L (ref 14–36)
BASOPHILS # BLD AUTO: 0.1 K/UL (ref 0–0.2)
BASOPHILS NFR BLD AUTO: 1 %
BUN SERPL-SCNC: 13 MG/DL (ref 7–17)
CALCIUM SPEC-MCNC: 9.4 MG/DL (ref 8.4–10.2)
CHLORIDE SERPL-SCNC: 109 MMOL/L (ref 98–107)
CO2 SERPL-SCNC: 28 MMOL/L (ref 22–30)
EOSINOPHIL # BLD AUTO: 0.4 K/UL (ref 0–0.7)
EOSINOPHIL NFR BLD AUTO: 6 %
ERYTHROCYTE [DISTWIDTH] IN BLOOD BY AUTOMATED COUNT: 4.82 M/UL (ref 3.8–5.4)
ERYTHROCYTE [DISTWIDTH] IN BLOOD: 14.1 % (ref 11.5–15.5)
GLUCOSE SERPL-MCNC: 114 MG/DL (ref 74–99)
HCT VFR BLD AUTO: 42.9 % (ref 34–46)
HGB BLD-MCNC: 14.3 GM/DL (ref 11.4–16)
LYMPHOCYTES # SPEC AUTO: 2.5 K/UL (ref 1–4.8)
LYMPHOCYTES NFR SPEC AUTO: 36 %
MCH RBC QN AUTO: 29.8 PG (ref 25–35)
MCHC RBC AUTO-ENTMCNC: 33.4 G/DL (ref 31–37)
MCV RBC AUTO: 89.1 FL (ref 80–100)
MONOCYTES # BLD AUTO: 0.5 K/UL (ref 0–1)
MONOCYTES NFR BLD AUTO: 7 %
NEUTROPHILS # BLD AUTO: 3.3 K/UL (ref 1.3–7.7)
NEUTROPHILS NFR BLD AUTO: 48 %
PLATELET # BLD AUTO: 202 K/UL (ref 150–450)
POTASSIUM SERPL-SCNC: 3.9 MMOL/L (ref 3.5–5.1)
PROT SERPL-MCNC: 6.2 G/DL (ref 6.3–8.2)
SODIUM SERPL-SCNC: 143 MMOL/L (ref 137–145)
WBC # BLD AUTO: 6.9 K/UL (ref 3.8–10.6)

## 2021-05-27 PROCEDURE — 80320 DRUG SCREEN QUANTALCOHOLS: CPT

## 2021-05-27 PROCEDURE — 82075 ASSAY OF BREATH ETHANOL: CPT

## 2021-05-27 PROCEDURE — 85025 COMPLETE CBC W/AUTO DIFF WBC: CPT

## 2021-05-27 PROCEDURE — 80053 COMPREHEN METABOLIC PANEL: CPT

## 2021-05-27 PROCEDURE — 36415 COLL VENOUS BLD VENIPUNCTURE: CPT

## 2021-05-27 PROCEDURE — 99285 EMERGENCY DEPT VISIT HI MDM: CPT

## 2021-05-27 NOTE — ED
General Adult HPI





- General


Chief complaint: Recheck/Abnormal Lab/Rx


Stated complaint: Mental Health


Time Seen by Provider: 21 17:42


Source: EMS


Mode of arrival: EMS





- History of Present Illness


Initial comments: 


75 year-old female patient is brought in via EMS for psychiatric evaluation. 

Patient's daughter lives with her and is concerned she is on the verge of a 

psychotic break. States that her mother is behaving very oddly. States that she 

is spreading a "white pasty substance" all through the house. She refuses to eat

unless she is served and then will only eat bologna sandwiches. States that she 

refuses to shower or care for herself. Wants to drive when she does not have 

license. Refuses to take her medications. She is concern that she is a danger to

herself and is hoping she can go to [facility] facility.  Since denies any 

suicidal or homicidal ideation.  Thinks that she is fine.  Does not know why she

is here.  Denies any current physical illnesses or symptoms. Patient denies any 

recent rash, fever, chills, cough, shortness of breath, chest pain, abdominal 

pain, nausea, vomiting, diarrhea, constipation, back pain, numbness, tingling, 

dizziness, weakness, hematuria, dysuria, urinary urgency, urinary frequency, 

headache, visual changes, or any other complaints.








- Related Data


                                Home Medications











 Medication  Instructions  Recorded  Confirmed


 


lisinopriL [Zestril] 5 mg PO DAILY 21


 


risperiDONE [RisperDAL] 2 mg PO HS 21











                                    Allergies











Allergy/AdvReac Type Severity Reaction Status Date / Time


 


No Known Allergies Allergy   Verified 21 18:28














Review of Systems


ROS Statement: 


Those systems with pertinent positive or pertinent negative responses have been 

documented in the HPI.





ROS Other: All systems not noted in ROS Statement are negative.





Past Medical History


Past Medical History: Cancer, COPD, GERD/Reflux, Hyperlipidemia, Hypertension, 

Osteoarthritis (OA), Renal Disease, Syncope


Additional Past Medical History / Comment(s): SYNCOPAL EPISODES RECENTLY, 

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

WITH REMOVAL.


History of Any Multi-Drug Resistant Organisms: None Reported


Past Surgical History: Hysterectomy, Tonsillectomy


Additional Past Surgical History / Comment(s): SKIN CANCER REMOVALS, COLONOSCOP

Y.


Past Anesthesia/Blood Transfusion Reactions: No Reported Reaction


Past Psychological History: Bipolar


Smoking Status: Current every day smoker


Past Alcohol Use History: None Reported


Past Drug Use History: None Reported





- Past Family History


  ** Mother


Additional Family Medical History / Comment(s): MOTHER HAD A "BAD HEART."





  ** Father


Additional Family Medical History / Comment(s): FATHER  IN A PLANE CRASH 

EARLIER IN LIFE.





General Exam


General appearance: alert, in no apparent distress, other (Physical well-

developed, well-nourished elderly female patient in no acute distress.)


Eye exam: Present: normal appearance, PERRL, EOMI.  Absent: scleral icterus, 

conjunctival injection, periorbital swelling


ENT exam: Present: normal exam, normal oropharynx, mucous membranes moist


Respiratory exam: Present: normal lung sounds bilaterally.  Absent: respiratory 

distress, wheezes, rales, rhonchi, stridor


Cardiovascular Exam: Present: regular rate, normal rhythm, normal heart sounds. 

Absent: systolic murmur, diastolic murmur, rubs, gallop, clicks


GI/Abdominal exam: Present: soft, normal bowel sounds.  Absent: distended, 

tenderness, guarding, rebound, rigid


Neurological exam: Present: alert, oriented X3, CN II-XII intact


Psychiatric exam: Present: normal affect, normal mood


Skin exam: Present: warm, dry, intact, normal color.  Absent: rash





Course


                                   Vital Signs











  21





  17:38 21:00


 


Temperature 98.8 F 


 


Pulse Rate 85 88


 


Respiratory 18 16





Rate  


 


Blood Pressure 159/80 128/68


 


O2 Sat by Pulse 99 98





Oximetry  














Medical Decision Making





- Medical Decision Making


75-year-old female patient has medical history significant for bipolar disorder 

is brought into the hospital by daughter for psychiatric evaluation.  Daughter 

states that she is not taking her medication, not sleeping well, and seems like 

she is on the verge of a "psychotic break".  Physical examination is 

unremarkable.  Patient is answering questions appropriately.  She is alert and 

oriented 4.  She denies any suicidal or homicidal ideation.  She was seen and 

evaluated by emergency psychiatric services after being medically cleared.  They

determined she does not meet criteria for inpatient admission and she'll be 

discharged home with a safety plan.  She is instructed to follow up with 

outpatient psychiatry and counseling.  Instructed to follow-up with her primary 

care physician for recheck in 1-2 days.  I did discuss this plan with her 

daughter, she has not pleased with this plan, though I did discuss that she can 

return at anytime if her mother's condition changes.  She verbalizes 

understanding.  My attending is Dr. Perez.








- Lab Data


Result diagrams: 


                                 21 18:30





                                 21 18:30


                                   Lab Results











  21 Range/Units





  18:30 18:30 


 


WBC  6.9   (3.8-10.6)  k/uL


 


RBC  4.82   (3.80-5.40)  m/uL


 


Hgb  14.3   (11.4-16.0)  gm/dL


 


Hct  42.9   (34.0-46.0)  %


 


MCV  89.1   (80.0-100.0)  fL


 


MCH  29.8   (25.0-35.0)  pg


 


MCHC  33.4   (31.0-37.0)  g/dL


 


RDW  14.1   (11.5-15.5)  %


 


Plt Count  202   (150-450)  k/uL


 


MPV  8.0   


 


Neutrophils %  48   %


 


Lymphocytes %  36   %


 


Monocytes %  7   %


 


Eosinophils %  6   %


 


Basophils %  1   %


 


Neutrophils #  3.3   (1.3-7.7)  k/uL


 


Lymphocytes #  2.5   (1.0-4.8)  k/uL


 


Monocytes #  0.5   (0-1.0)  k/uL


 


Eosinophils #  0.4   (0-0.7)  k/uL


 


Basophils #  0.1   (0-0.2)  k/uL


 


Sodium   143  (137-145)  mmol/L


 


Potassium   3.9  (3.5-5.1)  mmol/L


 


Chloride   109 H  ()  mmol/L


 


Carbon Dioxide   28  (22-30)  mmol/L


 


Anion Gap   6  mmol/L


 


BUN   13  (7-17)  mg/dL


 


Creatinine   0.44 L  (0.52-1.04)  mg/dL


 


Est GFR (CKD-EPI)AfAm   >90  (>60 ml/min/1.73 sqM)  


 


Est GFR (CKD-EPI)NonAf   >90  (>60 ml/min/1.73 sqM)  


 


Glucose   114 H  (74-99)  mg/dL


 


Calcium   9.4  (8.4-10.2)  mg/dL


 


Total Bilirubin   1.0  (0.2-1.3)  mg/dL


 


AST   31  (14-36)  U/L


 


ALT   25  (4-34)  U/L


 


Alkaline Phosphatase   90  ()  U/L


 


Total Protein   6.2 L  (6.3-8.2)  g/dL


 


Albumin   3.8  (3.5-5.0)  g/dL


 


Serum Alcohol   <10  mg/dL














Disposition


Clinical Impression: 


 History of bipolar disorder





Disposition: HOME SELF-CARE


Condition: Good


Instructions (If sedation given, give patient instructions):  Bipolar Disorder 

(ED)


Additional Instructions: 


Follow-up with outpatient mental health services.  Consider obtaining a 

psychiatrist and outpatient counseling.  Return for any new, worsening, or 

concerning symptoms.


Is patient prescribed a controlled substance at d/c from ED?: No


Referrals: 


Duc Rosas MD [Primary Care Provider] - 1-2 days


Time of Disposition: 20:27

## 2021-06-04 NOTE — CDI
Documentation Clarification Form



Date: 06/04/2021 11:08:00 AM

From: Chasity Chow

Phone: If you have a question about this query, please contact Ashly Christensen, 
 at [phone] between 8am and 5pm.

MRN: [id number]

Admit Date: 04/16/2021 05:00:00 PM

Patient Name: Lee Ann Bagley

Visit Number: [id number]

Discharge Date:  04/20/2021 03:00:00 PM





ATTENTION: The Clinical Documentation Specialists (CDI) and [facility] Coding Staff 
appreciate your assistance in clarifying documentation. Please respond to the 
clarification below the line at the bottom and electronically sign. The CDI & 
[facility] Coding staff will review the response and follow-up if needed. Please note: 
Queries are made part of the Legal Health Record. If you have any questions, 
please contact the author of this message via ITS.



Dr. Braulio Batres



Your patient has an O2 sat pf 87% on RA with no home O2 requirements.  
Documentation throughout the chart is hypoxia without respiratory failure.  
Based on O2 sat of 87% more clarification is needed for conflicting clinical 
indicators and documentation.



History/Risk Factors:   exacerbation of COPD

Tobacco use: tobacco dependence

Home oxygen: evaluating for home oxygen



Clinical Indicators: 

Vital signs:  98.1  F,    70 bpm,     24      157/79,      96 4NC

Pulse oximetry:  lowest at 87% on RA



Treatment:  4 NC



Is there an additional diagnosis that is clinically appropriate for this 
patient?

[  ]   Without respiratory failure

[  ] Acute Hypoxic Respiratory Failure (pO2 <60 mm Hg or SpO2 <91% on room air)

[  ] Acute Hypercapnic Respiratory Failure (pCO2 >50 and pH <7.35)

[  ] Acute on Chronic Respiratory Failure

[  ] Chronic Respiratory Failure

[  x] Acute Respiratory Distress

[  ] Acute Respiratory Insufficiency

[  ] Other Diagnosis, please specify _____

[  ] Unable to determine







___________________________________________________________________________

MTDD

## 2021-10-09 ENCOUNTER — HOSPITAL ENCOUNTER (INPATIENT)
Dept: HOSPITAL 47 - EC | Age: 76
LOS: 3 days | Discharge: NURSING, REHAB OR LONG-TERM CARE | DRG: 558 | End: 2021-10-12
Attending: INTERNAL MEDICINE | Admitting: INTERNAL MEDICINE
Payer: MEDICARE

## 2021-10-09 DIAGNOSIS — G25.0: ICD-10-CM

## 2021-10-09 DIAGNOSIS — Z87.442: ICD-10-CM

## 2021-10-09 DIAGNOSIS — F17.210: ICD-10-CM

## 2021-10-09 DIAGNOSIS — F03.91: ICD-10-CM

## 2021-10-09 DIAGNOSIS — F31.9: ICD-10-CM

## 2021-10-09 DIAGNOSIS — Z79.899: ICD-10-CM

## 2021-10-09 DIAGNOSIS — D72.829: ICD-10-CM

## 2021-10-09 DIAGNOSIS — T46.5X6A: ICD-10-CM

## 2021-10-09 DIAGNOSIS — Z90.710: ICD-10-CM

## 2021-10-09 DIAGNOSIS — I16.0: ICD-10-CM

## 2021-10-09 DIAGNOSIS — Z91.81: ICD-10-CM

## 2021-10-09 DIAGNOSIS — E78.5: ICD-10-CM

## 2021-10-09 DIAGNOSIS — J44.9: ICD-10-CM

## 2021-10-09 DIAGNOSIS — Z20.822: ICD-10-CM

## 2021-10-09 DIAGNOSIS — K40.90: ICD-10-CM

## 2021-10-09 DIAGNOSIS — I10: ICD-10-CM

## 2021-10-09 DIAGNOSIS — Z85.828: ICD-10-CM

## 2021-10-09 DIAGNOSIS — Z91.128: ICD-10-CM

## 2021-10-09 DIAGNOSIS — Z53.29: ICD-10-CM

## 2021-10-09 DIAGNOSIS — M62.82: Primary | ICD-10-CM

## 2021-10-09 DIAGNOSIS — F20.9: ICD-10-CM

## 2021-10-09 DIAGNOSIS — R26.81: ICD-10-CM

## 2021-10-09 DIAGNOSIS — Z91.19: ICD-10-CM

## 2021-10-09 LAB
ALBUMIN SERPL-MCNC: 3.5 G/DL (ref 3.5–5)
ALP SERPL-CCNC: 82 U/L (ref 38–126)
ALT SERPL-CCNC: 18 U/L (ref 4–34)
ANION GAP SERPL CALC-SCNC: 6 MMOL/L
APTT BLD: 25.3 SEC (ref 22–30)
AST SERPL-CCNC: 55 U/L (ref 14–36)
BASOPHILS # BLD AUTO: 0 K/UL (ref 0–0.2)
BASOPHILS NFR BLD AUTO: 0 %
BUN SERPL-SCNC: 24 MG/DL (ref 7–17)
CALCIUM SPEC-MCNC: 9.6 MG/DL (ref 8.4–10.2)
CHLORIDE SERPL-SCNC: 109 MMOL/L (ref 98–107)
CK SERPL-CCNC: 2120 U/L (ref 30–135)
CO2 SERPL-SCNC: 27 MMOL/L (ref 22–30)
EOSINOPHIL # BLD AUTO: 0.1 K/UL (ref 0–0.7)
EOSINOPHIL NFR BLD AUTO: 1 %
ERYTHROCYTE [DISTWIDTH] IN BLOOD BY AUTOMATED COUNT: 5.09 M/UL (ref 3.8–5.4)
ERYTHROCYTE [DISTWIDTH] IN BLOOD: 14.3 % (ref 11.5–15.5)
GLUCOSE SERPL-MCNC: 103 MG/DL (ref 74–99)
HCT VFR BLD AUTO: 46.9 % (ref 34–46)
HGB BLD-MCNC: 15.3 GM/DL (ref 11.4–16)
HYALINE CASTS UR QL AUTO: 8 /LPF (ref 0–2)
INR PPP: 1 (ref ?–1.2)
LYMPHOCYTES # SPEC AUTO: 2.2 K/UL (ref 1–4.8)
LYMPHOCYTES NFR SPEC AUTO: 16 %
MAGNESIUM SPEC-SCNC: 1.9 MG/DL (ref 1.6–2.3)
MCH RBC QN AUTO: 30.1 PG (ref 25–35)
MCHC RBC AUTO-ENTMCNC: 32.7 G/DL (ref 31–37)
MCV RBC AUTO: 92.2 FL (ref 80–100)
MONOCYTES # BLD AUTO: 0.7 K/UL (ref 0–1)
MONOCYTES NFR BLD AUTO: 6 %
NEUTROPHILS # BLD AUTO: 10.3 K/UL (ref 1.3–7.7)
NEUTROPHILS NFR BLD AUTO: 76 %
PH UR: 6 [PH] (ref 5–8)
PLATELET # BLD AUTO: 220 K/UL (ref 150–450)
POTASSIUM SERPL-SCNC: 3.8 MMOL/L (ref 3.5–5.1)
PROT SERPL-MCNC: 6 G/DL (ref 6.3–8.2)
PROT UR QL: (no result)
PT BLD: 10.6 SEC (ref 9–12)
RBC UR QL: 3 /HPF (ref 0–5)
SODIUM SERPL-SCNC: 142 MMOL/L (ref 137–145)
SP GR UR: 1.01 (ref 1–1.03)
SQUAMOUS UR QL AUTO: <1 /HPF (ref 0–4)
URATE SERPL-MCNC: 4.2 MG/DL (ref 3.7–7.4)
UROBILINOGEN UR QL STRIP: <2 MG/DL (ref ?–2)
WBC # BLD AUTO: 13.6 K/UL (ref 3.8–10.6)
WBC # UR AUTO: 3 /HPF (ref 0–5)

## 2021-10-09 PROCEDURE — 99285 EMERGENCY DEPT VISIT HI MDM: CPT

## 2021-10-09 PROCEDURE — 84100 ASSAY OF PHOSPHORUS: CPT

## 2021-10-09 PROCEDURE — 85730 THROMBOPLASTIN TIME PARTIAL: CPT

## 2021-10-09 PROCEDURE — 71046 X-RAY EXAM CHEST 2 VIEWS: CPT

## 2021-10-09 PROCEDURE — 83735 ASSAY OF MAGNESIUM: CPT

## 2021-10-09 PROCEDURE — 96361 HYDRATE IV INFUSION ADD-ON: CPT

## 2021-10-09 PROCEDURE — 85025 COMPLETE CBC W/AUTO DIFF WBC: CPT

## 2021-10-09 PROCEDURE — 80053 COMPREHEN METABOLIC PANEL: CPT

## 2021-10-09 PROCEDURE — 80306 DRUG TEST PRSMV INSTRMNT: CPT

## 2021-10-09 PROCEDURE — 82140 ASSAY OF AMMONIA: CPT

## 2021-10-09 PROCEDURE — 81001 URINALYSIS AUTO W/SCOPE: CPT

## 2021-10-09 PROCEDURE — 70450 CT HEAD/BRAIN W/O DYE: CPT

## 2021-10-09 PROCEDURE — 84550 ASSAY OF BLOOD/URIC ACID: CPT

## 2021-10-09 PROCEDURE — 84484 ASSAY OF TROPONIN QUANT: CPT

## 2021-10-09 PROCEDURE — 87635 SARS-COV-2 COVID-19 AMP PRB: CPT

## 2021-10-09 PROCEDURE — 36415 COLL VENOUS BLD VENIPUNCTURE: CPT

## 2021-10-09 PROCEDURE — 82550 ASSAY OF CK (CPK): CPT

## 2021-10-09 PROCEDURE — 96374 THER/PROPH/DIAG INJ IV PUSH: CPT

## 2021-10-09 PROCEDURE — 85610 PROTHROMBIN TIME: CPT

## 2021-10-09 PROCEDURE — 93005 ELECTROCARDIOGRAM TRACING: CPT

## 2021-10-09 RX ADMIN — CEFAZOLIN SCH MLS/HR: 330 INJECTION, POWDER, FOR SOLUTION INTRAMUSCULAR; INTRAVENOUS at 14:30

## 2021-10-09 NOTE — ED
Altered Mental Status HPI





- General


Source: patient, EMS, RN notes reviewed, old records reviewed


Mode of arrival: EMS


Limitations: altered mental status





<Selam Tan - Last Filed: 10/09/21 13:58>





<Clara Lincoln - Last Filed: 10/11/21 01:12>





- General


Chief Complaint: Altered Mental Status


Stated Complaint: AMS


Time Seen by Provider: 10/09/21 09:36





- History of Present Illness


Initial Comments: 





This Patient is a 76-year-old female who presents emergency Department via EMS. 

Patient has a history of bipolar disorder, and schizophrenia according to 

daughter.  Patient also has a history of developing dementia.  Reportedly for 

the past few months Patient has been showing signs that she is unable to take 

care of herself including poor understanding to eat properly.  She reportedly is

also refusing to take multiple medications and her daughter has to force her to 

take them.  Daughter reports that as the passing of her  she's had 

increased manic episodes.  This morning the daughter came to see the Patient 

after coming back from out-of-town and Patient was found to be naked and crawled

underneath her bed.  She reportedly was laying between her bed and the wall for 

hours.   The patient  does not know why she did this.  Patient denies falling.  

Patient's daughter reports that there is been worsening mental status changes.  

Daughter reports that she will be unable to take care of her at home and is 

looking for placement to a long-term care facility for possible psychiatric 

facility.  Patient denies any suicidal thoughts.  The daughter reports she has 

been refusing her hypertension medications as well and her BP is elavated to 

192/100 on arrival. 





Patient denies any chest pain, shortness of breath.  She denies any abdominal 

pain.  She reports that she has no significant changes in urination or stools. 

(Selam Tan)





- Related Data


                                Home Medications











 Medication  Instructions  Recorded  Confirmed


 


lisinopriL [Zestril] 5 mg PO DAILY 05/27/21 10/09/21


 


risperiDONE [RisperDAL] 2 mg PO HS 05/27/21 10/09/21


 


QUEtiapine [SEROquel] 25 mg PO BID 10/09/21 10/09/21











                                    Allergies











Allergy/AdvReac Type Severity Reaction Status Date / Time


 


No Known Allergies Allergy   Verified 10/09/21 12:27














Review of Systems


ROS Other: All systems not noted in ROS Statement are negative.





<Selam Tan - Last Filed: 10/09/21 13:58>


ROS Other: All systems not noted in ROS Statement are negative.





<Clara Lincoln - Last Filed: 10/11/21 01:12>


ROS Statement: 


Those systems with pertinent positive or pertinent negative responses have been 

documented in the HPI.








Past Medical History


Past Medical History: Cancer, COPD, Dementia, GERD/Reflux, Hyperlipidemia, H

ypertension, Osteoarthritis (OA), Renal Disease, Syncope


Additional Past Medical History / Comment(s): SYNCOPAL EPISODES RECENTLY, 

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

 WITH REMOVAL.


History of Any Multi-Drug Resistant Organisms: None Reported


Past Surgical History: Hysterectomy, Tonsillectomy


Additional Past Surgical History / Comment(s): SKIN CANCER REMOVALS, 

COLONOSCOPY.


Past Anesthesia/Blood Transfusion Reactions: No Reported Reaction


Past Psychological History: Bipolar


Smoking Status: Current every day smoker


Past Alcohol Use History: None Reported


Past Drug Use History: None Reported





- Past Family History


  ** Mother


Additional Family Medical History / Comment(s): MOTHER HAD A "BAD HEART."





  ** Father


Additional Family Medical History / Comment(s): FATHER  IN A PLANE CRASH 

EARLIER IN LIFE.





<Selam Tan - Last Filed: 10/09/21 13:58>





General Exam


Limitations: altered mental status


General appearance: alert, in no apparent distress


Head exam: Present: atraumatic, normocephalic, normal inspection


Eye exam: Present: normal appearance, PERRL, EOMI.  Absent: scleral icterus, 

conjunctival injection, periorbital swelling


ENT exam: Present: normal exam, mucous membranes moist


Neck exam: Present: normal inspection.  Absent: tenderness, meningismus, 

lymphadenopathy


Respiratory exam: Present: normal lung sounds bilaterally.  Absent: respiratory 

distress, wheezes, rales, rhonchi, stridor


Cardiovascular Exam: Present: regular rate, normal rhythm, normal heart sounds. 

 Absent: systolic murmur, diastolic murmur, rubs, gallop, clicks


GI/Abdominal exam: Present: soft, normal bowel sounds.  Absent: distended, 

tenderness, guarding, rebound, rigid


Extremities exam: Present: normal inspection, full ROM, normal capillary refill,

 other (bruising on right shoulder ).  Absent: tenderness, pedal edema, joint 

swelling, calf tenderness


Back exam: Present: normal inspection


Neurological exam: Present: alert, oriented X3, CN II-XII intact


Psychiatric exam: Present: normal mood, manic (daughter reports manic moods ).  

Absent: normal affect


Skin exam: Present: warm, dry, intact, normal color.  Absent: rash





<Selam Tan - Last Filed: 10/09/21 13:58>





- General Exam Comments


Initial Comments: 





76 year old female, slight confusion.  (Selam Tan)





Course





                                   Vital Signs











  10/09/21 10/09/21 10/09/21





  09:33 11:50 12:27


 


Temperature 97.4 F L  


 


Pulse Rate 69 64 60


 


Respiratory 16 18 18





Rate   


 


Blood Pressure 197/102 186/91 188/93


 


O2 Sat by Pulse 98 98 94 L





Oximetry   














  10/09/21 10/09/21 10/09/21





  13:37 15:17 20:13


 


Temperature   


 


Pulse Rate 69 71 81


 


Respiratory 18 18 18





Rate   


 


Blood Pressure 188/93 158/90 143/92


 


O2 Sat by Pulse 96 96 98





Oximetry   














  10/10/21 10/10/21 10/10/21





  01:20 06:16 10:40


 


Temperature  97.0 F L 97.7 F


 


Pulse Rate 89 88 80


 


Respiratory 18 16 20





Rate   


 


Blood Pressure 166/84 174/84 176/92


 


O2 Sat by Pulse 89 L 96 96





Oximetry   














Medical Decision Making





- Lab Data


Result diagrams: 


                                 10/09/21 11:16





                                 10/09/21 11:16





- Radiology Data


Radiology results: report reviewed





<Selam Tan - Last Filed: 10/09/21 13:58>





- Lab Data


Result diagrams: 


                                 10/10/21 06:29





                                 10/10/21 06:29





<Clara Lincoln - Last Filed: 10/11/21 01:12>





- Medical Decision Making





This patient's a 76-year-old female who was brought to the ER via EMS history of

 bipolar disorder, manic episodes and psychosis, and developing dementia.  She 

was found by daughter laying underneath her bed naked after being there for many

 hours.  Patient has bruising over shoulder.  Apparently for the past few months

 after the passing of her  the Patient has not been able to take care of 

herself well, refusing medications.  Her daughter reports that she is at her 

wits end and will not be able to take care of her at home due to normal multiple

 manic episodes and worsening psychosis since her 's passing. 





Daughter, Cindy can be reached at [phone]. She wants to be available when 

psychiatrist is evaluating patient. 





Patient's lab work was reviewed.  She does have an elevated creatinine kinase 

likely related to early rhabdomyolysis from being laying underneath her bed any 

hours.  Patient also has a mildly elevated troponin 0.049.  She denies any chest

 pain.  Likely related to uncontrolled hypertension with refusing to take her 

medications according to the daughter.  Normal home medications be resumed.  I 

did discuss the case with Dr. Calhoun.  He recommends consultation with 

psychiatry and trending serum troponins. Patient daughter was contacted and 

continues to reiterate that she wants her mother admitted to a long term care 

facility as she does not believe she can take care of her self.  (Selam Tan)


I was available for consultation in the emergency department.  The history and 

physical exam were done by the midlevel provider. I was consulted for this 

patients care. I reviewed the case with the midlevel provider and based on 

their presentation of the patient, I agree with the assessment, medical decision

 making and plan of care as documented. 


Chart was dictated using Dragon dictation software.  Attempts were made to 

correct any dictation errors however some typographical errors may persist.  

(Clara Lincoln)





- Lab Data





                                   Lab Results











  10/09/21 10/09/21 10/09/21 Range/Units





  11:16 11:16 11:16 


 


WBC  13.6 H    (3.8-10.6)  k/uL


 


RBC  5.09    (3.80-5.40)  m/uL


 


Hgb  15.3    (11.4-16.0)  gm/dL


 


Hct  46.9 H    (34.0-46.0)  %


 


MCV  92.2    (80.0-100.0)  fL


 


MCH  30.1    (25.0-35.0)  pg


 


MCHC  32.7    (31.0-37.0)  g/dL


 


RDW  14.3    (11.5-15.5)  %


 


Plt Count  220    (150-450)  k/uL


 


MPV  8.8    


 


Neutrophils %  76    %


 


Lymphocytes %  16    %


 


Monocytes %  6    %


 


Eosinophils %  1    %


 


Basophils %  0    %


 


Neutrophils #  10.3 H    (1.3-7.7)  k/uL


 


Lymphocytes #  2.2    (1.0-4.8)  k/uL


 


Monocytes #  0.7    (0-1.0)  k/uL


 


Eosinophils #  0.1    (0-0.7)  k/uL


 


Basophils #  0.0    (0-0.2)  k/uL


 


PT   10.6   (9.0-12.0)  sec


 


INR   1.0   (<1.2)  


 


APTT   25.3   (22.0-30.0)  sec


 


Sodium     (137-145)  mmol/L


 


Potassium     (3.5-5.1)  mmol/L


 


Chloride     ()  mmol/L


 


Carbon Dioxide     (22-30)  mmol/L


 


Anion Gap     mmol/L


 


BUN     (7-17)  mg/dL


 


Creatinine     (0.52-1.04)  mg/dL


 


Est GFR (CKD-EPI)AfAm     (>60 ml/min/1.73 sqM)  


 


Est GFR (CKD-EPI)NonAf     (>60 ml/min/1.73 sqM)  


 


Glucose     (74-99)  mg/dL


 


Uric Acid     (3.7-7.4)  mg/dL


 


Calcium     (8.4-10.2)  mg/dL


 


Phosphorus     (2.5-4.5)  mg/dL


 


Magnesium     (1.6-2.3)  mg/dL


 


Total Bilirubin     (0.2-1.3)  mg/dL


 


AST     (14-36)  U/L


 


ALT     (4-34)  U/L


 


Alkaline Phosphatase     ()  U/L


 


Ammonia     (<30)  umol/L


 


Creatine Kinase     ()  U/L


 


Troponin I     (0.000-0.034)  ng/mL


 


Total Protein     (6.3-8.2)  g/dL


 


Albumin     (3.5-5.0)  g/dL


 


Urine Color    Yellow  


 


Urine Appearance    Cloudy H  (Clear)  


 


Urine pH    6.0  (5.0-8.0)  


 


Ur Specific Gravity    1.013  (1.001-1.035)  


 


Urine Protein    1+ H  (Negative)  


 


Urine Glucose (UA)    Negative  (Negative)  


 


Urine Ketones    Negative  (Negative)  


 


Urine Blood    Negative  (Negative)  


 


Urine Nitrite    Negative  (Negative)  


 


Urine Bilirubin    Negative  (Negative)  


 


Urine Urobilinogen    <2.0  (<2.0)  mg/dL


 


Ur Leukocyte Esterase    Negative  (Negative)  


 


Urine RBC    3  (0-5)  /hpf


 


Urine WBC    3  (0-5)  /hpf


 


Ur Squamous Epith Cells    <1  (0-4)  /hpf


 


Urine Bacteria    Rare H  (None)  /hpf


 


Hyaline Casts    8 H  (0-2)  /lpf


 


Urine Mucus    Few H  (None)  /hpf


 


Urine Opiates Screen    Not Detected  (NotDetected)  


 


Ur Oxycodone Screen    Not Detected  (NotDetected)  


 


Urine Methadone Screen    Not Detected  (NotDetected)  


 


Ur Propoxyphene Screen    Not Detected  (NotDetected)  


 


Ur Barbiturates Screen    Not Detected  (NotDetected)  


 


U Tricyclic Antidepress    Detected H  (NotDetected)  


 


Ur Phencyclidine Scrn    Not Detected  (NotDetected)  


 


Ur Amphetamines Screen    Not Detected  (NotDetected)  


 


U Methamphetamines Scrn    Not Detected  (NotDetected)  


 


U Benzodiazepines Scrn    Not Detected  (NotDetected)  


 


Urine Cocaine Screen    Not Detected  (NotDetected)  


 


U Marijuana (THC) Screen    Not Detected  (NotDetected)  














  10/09/21 10/09/21 10/09/21 Range/Units





  11:16 11:16 11:16 


 


WBC     (3.8-10.6)  k/uL


 


RBC     (3.80-5.40)  m/uL


 


Hgb     (11.4-16.0)  gm/dL


 


Hct     (34.0-46.0)  %


 


MCV     (80.0-100.0)  fL


 


MCH     (25.0-35.0)  pg


 


MCHC     (31.0-37.0)  g/dL


 


RDW     (11.5-15.5)  %


 


Plt Count     (150-450)  k/uL


 


MPV     


 


Neutrophils %     %


 


Lymphocytes %     %


 


Monocytes %     %


 


Eosinophils %     %


 


Basophils %     %


 


Neutrophils #     (1.3-7.7)  k/uL


 


Lymphocytes #     (1.0-4.8)  k/uL


 


Monocytes #     (0-1.0)  k/uL


 


Eosinophils #     (0-0.7)  k/uL


 


Basophils #     (0-0.2)  k/uL


 


PT     (9.0-12.0)  sec


 


INR     (<1.2)  


 


APTT     (22.0-30.0)  sec


 


Sodium  142    (137-145)  mmol/L


 


Potassium  3.8    (3.5-5.1)  mmol/L


 


Chloride  109 H    ()  mmol/L


 


Carbon Dioxide  27    (22-30)  mmol/L


 


Anion Gap  6    mmol/L


 


BUN  24 H    (7-17)  mg/dL


 


Creatinine  0.66    (0.52-1.04)  mg/dL


 


Est GFR (CKD-EPI)AfAm  >90    (>60 ml/min/1.73 sqM)  


 


Est GFR (CKD-EPI)NonAf  85    (>60 ml/min/1.73 sqM)  


 


Glucose  103 H    (74-99)  mg/dL


 


Uric Acid     (3.7-7.4)  mg/dL


 


Calcium  9.6    (8.4-10.2)  mg/dL


 


Phosphorus     (2.5-4.5)  mg/dL


 


Magnesium     (1.6-2.3)  mg/dL


 


Total Bilirubin  1.8 H    (0.2-1.3)  mg/dL


 


AST  55 H    (14-36)  U/L


 


ALT  18    (4-34)  U/L


 


Alkaline Phosphatase  82    ()  U/L


 


Ammonia   <9   (<30)  umol/L


 


Creatine Kinase  2120 H*    ()  U/L


 


Troponin I    0.049 H*  (0.000-0.034)  ng/mL


 


Total Protein  6.0 L    (6.3-8.2)  g/dL


 


Albumin  3.5    (3.5-5.0)  g/dL


 


Urine Color     


 


Urine Appearance     (Clear)  


 


Urine pH     (5.0-8.0)  


 


Ur Specific Gravity     (1.001-1.035)  


 


Urine Protein     (Negative)  


 


Urine Glucose (UA)     (Negative)  


 


Urine Ketones     (Negative)  


 


Urine Blood     (Negative)  


 


Urine Nitrite     (Negative)  


 


Urine Bilirubin     (Negative)  


 


Urine Urobilinogen     (<2.0)  mg/dL


 


Ur Leukocyte Esterase     (Negative)  


 


Urine RBC     (0-5)  /hpf


 


Urine WBC     (0-5)  /hpf


 


Ur Squamous Epith Cells     (0-4)  /hpf


 


Urine Bacteria     (None)  /hpf


 


Hyaline Casts     (0-2)  /lpf


 


Urine Mucus     (None)  /hpf


 


Urine Opiates Screen     (NotDetected)  


 


Ur Oxycodone Screen     (NotDetected)  


 


Urine Methadone Screen     (NotDetected)  


 


Ur Propoxyphene Screen     (NotDetected)  


 


Ur Barbiturates Screen     (NotDetected)  


 


U Tricyclic Antidepress     (NotDetected)  


 


Ur Phencyclidine Scrn     (NotDetected)  


 


Ur Amphetamines Screen     (NotDetected)  


 


U Methamphetamines Scrn     (NotDetected)  


 


U Benzodiazepines Scrn     (NotDetected)  


 


Urine Cocaine Screen     (NotDetected)  


 


U Marijuana (THC) Screen     (NotDetected)  














  10/09/21 Range/Units





  11:16 


 


WBC   (3.8-10.6)  k/uL


 


RBC   (3.80-5.40)  m/uL


 


Hgb   (11.4-16.0)  gm/dL


 


Hct   (34.0-46.0)  %


 


MCV   (80.0-100.0)  fL


 


MCH   (25.0-35.0)  pg


 


MCHC   (31.0-37.0)  g/dL


 


RDW   (11.5-15.5)  %


 


Plt Count   (150-450)  k/uL


 


MPV   


 


Neutrophils %   %


 


Lymphocytes %   %


 


Monocytes %   %


 


Eosinophils %   %


 


Basophils %   %


 


Neutrophils #   (1.3-7.7)  k/uL


 


Lymphocytes #   (1.0-4.8)  k/uL


 


Monocytes #   (0-1.0)  k/uL


 


Eosinophils #   (0-0.7)  k/uL


 


Basophils #   (0-0.2)  k/uL


 


PT   (9.0-12.0)  sec


 


INR   (<1.2)  


 


APTT   (22.0-30.0)  sec


 


Sodium   (137-145)  mmol/L


 


Potassium   (3.5-5.1)  mmol/L


 


Chloride   ()  mmol/L


 


Carbon Dioxide   (22-30)  mmol/L


 


Anion Gap   mmol/L


 


BUN   (7-17)  mg/dL


 


Creatinine   (0.52-1.04)  mg/dL


 


Est GFR (CKD-EPI)AfAm   (>60 ml/min/1.73 sqM)  


 


Est GFR (CKD-EPI)NonAf   (>60 ml/min/1.73 sqM)  


 


Glucose   (74-99)  mg/dL


 


Uric Acid  4.2  (3.7-7.4)  mg/dL


 


Calcium   (8.4-10.2)  mg/dL


 


Phosphorus  3.2  (2.5-4.5)  mg/dL


 


Magnesium  1.9  (1.6-2.3)  mg/dL


 


Total Bilirubin   (0.2-1.3)  mg/dL


 


AST   (14-36)  U/L


 


ALT   (4-34)  U/L


 


Alkaline Phosphatase   ()  U/L


 


Ammonia   (<30)  umol/L


 


Creatine Kinase   ()  U/L


 


Troponin I   (0.000-0.034)  ng/mL


 


Total Protein   (6.3-8.2)  g/dL


 


Albumin   (3.5-5.0)  g/dL


 


Urine Color   


 


Urine Appearance   (Clear)  


 


Urine pH   (5.0-8.0)  


 


Ur Specific Gravity   (1.001-1.035)  


 


Urine Protein   (Negative)  


 


Urine Glucose (UA)   (Negative)  


 


Urine Ketones   (Negative)  


 


Urine Blood   (Negative)  


 


Urine Nitrite   (Negative)  


 


Urine Bilirubin   (Negative)  


 


Urine Urobilinogen   (<2.0)  mg/dL


 


Ur Leukocyte Esterase   (Negative)  


 


Urine RBC   (0-5)  /hpf


 


Urine WBC   (0-5)  /hpf


 


Ur Squamous Epith Cells   (0-4)  /hpf


 


Urine Bacteria   (None)  /hpf


 


Hyaline Casts   (0-2)  /lpf


 


Urine Mucus   (None)  /hpf


 


Urine Opiates Screen   (NotDetected)  


 


Ur Oxycodone Screen   (NotDetected)  


 


Urine Methadone Screen   (NotDetected)  


 


Ur Propoxyphene Screen   (NotDetected)  


 


Ur Barbiturates Screen   (NotDetected)  


 


U Tricyclic Antidepress   (NotDetected)  


 


Ur Phencyclidine Scrn   (NotDetected)  


 


Ur Amphetamines Screen   (NotDetected)  


 


U Methamphetamines Scrn   (NotDetected)  


 


U Benzodiazepines Scrn   (NotDetected)  


 


Urine Cocaine Screen   (NotDetected)  


 


U Marijuana (THC) Screen   (NotDetected)  














10/09/21 12:53


EKG shows sinus rhythm with premature supraventricular complexes.  Possible left

 atrial enlargement.  Incomplete right bundle-branch block.  Borderline EKG. 

(Selam Tan)





- Radiology Data


Chronic small vessel ischemic changes, age-related atrophy.  Consider MRI for 

better evaluation is indicated. 





Chest x-rays negative for acute cardio pulmonary process.  Prominent lung 

volumes noted.  Aorta stents likely it tach take her aneurysmal, chronic. 

(Selam Tan)





Disposition


Is patient prescribed a controlled substance at d/c from ED?: No


Time of Disposition: 14:01





<Selam Tan - Last Filed: 10/09/21 13:58>





<Clara Lincoln - Last Filed: 10/11/21 01:12>


Clinical Impression: 


 Hypertension, History of bipolar disorder, Elevated creatine kinase, Altered 

mental state, Elevated troponin





Disposition: ADMITTED AS IP TO THIS HOSP


Condition: Stable

## 2021-10-09 NOTE — XR
EXAMINATION TYPE: XR chest 2V

 

DATE OF EXAM: 10/9/2021

 

COMPARISON: Chest x-ray 4/16/2021, chest x-ray 5/27/2013

 

HISTORY: Altered mental status

 

TECHNIQUE:  Frontal and lateral views of the chest are obtained.

 

FINDINGS:  There is no focal air space opacity, pleural effusion, or pneumothorax seen.  The cardiac 
silhouette size is within normal limits.  Prominent lung volumes again noted. Aorta is dense and like
ly ectatic or aneurysmal, chronic. The osseous structures are intact.

 

IMPRESSION:  No acute cardiopulmonary process. Additional findings above.

## 2021-10-09 NOTE — CT
EXAMINATION TYPE: CT brain wo con

 

DATE OF EXAM: 10/9/2021

 

COMPARISON: CT brain 11/13/2013

 

HISTORY: altered mental status

 

CT DLP: 1011.4 mGycm

Automated exposure control for dose reduction was used. Helical imaging through the brain.

 

FINDINGS: 

There are cerebrovascular calcifications. Cortical atrophy is present. Periventricular white matter s
hows patchy low attenuation. There are focal areas of low attenuation within the bilateral basal gang
lloyd regions which have developed in the interval, findings may represent some small focal areas of en
cephalomalacia due to lacunar infarcts. There is no hemorrhage or hydrocephalus. The calvarium is int
act. Osteoma present in the left frontal sinus. Hyperostosis frontalis interna changes are noted. Par
anasal sinuses and mastoid air cells are well aerated.

 

IMPRESSION: 

CHRONIC SMALL VESSEL ISCHEMIC CHANGES, AGE-RELATED ATROPHY. CONSIDER MRI FOR BETTER EVALUATION AS IND
ICATED.

## 2021-10-10 LAB
ALBUMIN SERPL-MCNC: 3.2 G/DL (ref 3.5–5)
ALBUMIN/GLOB SERPL: 1.3 {RATIO}
ALP SERPL-CCNC: 78 U/L (ref 38–126)
ALT SERPL-CCNC: 21 U/L (ref 4–34)
ANION GAP SERPL CALC-SCNC: 5 MMOL/L
AST SERPL-CCNC: 59 U/L (ref 14–36)
BASOPHILS # BLD AUTO: 0.1 K/UL (ref 0–0.2)
BASOPHILS NFR BLD AUTO: 1 %
BUN SERPL-SCNC: 18 MG/DL (ref 7–17)
CALCIUM SPEC-MCNC: 9.2 MG/DL (ref 8.4–10.2)
CHLORIDE SERPL-SCNC: 109 MMOL/L (ref 98–107)
CK SERPL-CCNC: 1132 U/L (ref 30–135)
CO2 SERPL-SCNC: 27 MMOL/L (ref 22–30)
EOSINOPHIL # BLD AUTO: 0.2 K/UL (ref 0–0.7)
EOSINOPHIL NFR BLD AUTO: 2 %
ERYTHROCYTE [DISTWIDTH] IN BLOOD BY AUTOMATED COUNT: 4.97 M/UL (ref 3.8–5.4)
ERYTHROCYTE [DISTWIDTH] IN BLOOD: 14.2 % (ref 11.5–15.5)
GLOBULIN SER CALC-MCNC: 2.5 G/DL
GLUCOSE BLD-MCNC: 124 MG/DL (ref 75–99)
GLUCOSE SERPL-MCNC: 93 MG/DL (ref 74–99)
HCT VFR BLD AUTO: 46.3 % (ref 34–46)
HGB BLD-MCNC: 15.2 GM/DL (ref 11.4–16)
LYMPHOCYTES # SPEC AUTO: 2.3 K/UL (ref 1–4.8)
LYMPHOCYTES NFR SPEC AUTO: 26 %
MCH RBC QN AUTO: 30.5 PG (ref 25–35)
MCHC RBC AUTO-ENTMCNC: 32.8 G/DL (ref 31–37)
MCV RBC AUTO: 93.1 FL (ref 80–100)
MONOCYTES # BLD AUTO: 0.5 K/UL (ref 0–1)
MONOCYTES NFR BLD AUTO: 5 %
NEUTROPHILS # BLD AUTO: 5.8 K/UL (ref 1.3–7.7)
NEUTROPHILS NFR BLD AUTO: 64 %
PLATELET # BLD AUTO: 200 K/UL (ref 150–450)
POTASSIUM SERPL-SCNC: 3.4 MMOL/L (ref 3.5–5.1)
PROT SERPL-MCNC: 5.7 G/DL (ref 6.3–8.2)
SODIUM SERPL-SCNC: 141 MMOL/L (ref 137–145)
WBC # BLD AUTO: 8.9 K/UL (ref 3.8–10.6)

## 2021-10-10 RX ADMIN — CEFAZOLIN SCH MLS/HR: 330 INJECTION, POWDER, FOR SOLUTION INTRAMUSCULAR; INTRAVENOUS at 06:32

## 2021-10-10 RX ADMIN — CEFAZOLIN SCH MLS/HR: 330 INJECTION, POWDER, FOR SOLUTION INTRAMUSCULAR; INTRAVENOUS at 13:40

## 2021-10-10 NOTE — P.PN
Subjective


Progress Note Date: 10/10/21





No new complaints today.  Does have some pain from her fall.  Tells me a linear 

story regarding her reason for visit.





Objective





- Vital Signs


Vital signs: 


                                   Vital Signs











Temp  98.3 F   10/10/21 14:26


 


Pulse  80   10/10/21 14:26


 


Resp  17   10/10/21 14:26


 


BP  181/74   10/10/21 14:26


 


Pulse Ox  94 L  10/10/21 14:26








                                 Intake & Output











 10/09/21 10/10/21 10/10/21





 18:59 06:59 18:59


 


Weight 58.513 kg  














- Exam





Gen: awake, alert


HEENT: normocephalic, atraumatic, good hearing acuity, moist mucous membranes


Resp: good air exchange, breathing comfortably with no accessory muscle use


CVS: good distal perfusion x 4,


GI: soft, NTTP, ND


: no SPT, no CVAT, colmenares catheter not present


MSK: no pitting edema, no clubbing


Neuro: non-focal, moving all extremities


Psych: cooperative, euthymic mood





- Labs


CBC & Chem 7: 


                                 10/10/21 06:29





                                 10/10/21 06:29


Labs: 


                  Abnormal Lab Results - Last 24 Hours (Table)











  10/09/21 10/10/21 10/10/21 Range/Units





  22:50 06:29 06:29 


 


Hct   46.3 H   (34.0-46.0)  %


 


Potassium    3.4 L  (3.5-5.1)  mmol/L


 


Chloride    109 H  ()  mmol/L


 


BUN    18 H  (7-17)  mg/dL


 


POC Glucose (mg/dL)     (75-99)  mg/dL


 


Total Bilirubin    2.2 H  (0.2-1.3)  mg/dL


 


AST    59 H  (14-36)  U/L


 


Creatine Kinase    1132 H*  ()  U/L


 


Troponin I  0.049 H*    (0.000-0.034)  ng/mL


 


Total Protein    5.7 L  (6.3-8.2)  g/dL


 


Albumin    3.2 L  (3.5-5.0)  g/dL














  10/10/21 Range/Units





  08:44 


 


Hct   (34.0-46.0)  %


 


Potassium   (3.5-5.1)  mmol/L


 


Chloride   ()  mmol/L


 


BUN   (7-17)  mg/dL


 


POC Glucose (mg/dL)  124 H  (75-99)  mg/dL


 


Total Bilirubin   (0.2-1.3)  mg/dL


 


AST   (14-36)  U/L


 


Creatine Kinase   ()  U/L


 


Troponin I   (0.000-0.034)  ng/mL


 


Total Protein   (6.3-8.2)  g/dL


 


Albumin   (3.5-5.0)  g/dL














Assessment and Plan


Assessment: 





Acute rhabdomyolysis


Renal function stable


IV fluid hydration with normal saline


Follow-up creatine kinase





Progressive dementia


Behavioral changes


History of bipolar disorder and


Frequent manic episodes


Medical noncompliance


Difficulties with activities of daily living


CT of the brain showed chronic vascular changes


Psych eval





Hypertensive urgency


Secondary to medication noncompliance


Resume blood pressure medications





Hyperlipidemia resume statin





Patient is full code


Fall precautions


Patient daughter requesting placement as she is unable to take care of her 

mother anymore


DVT prophylaxis mechanical








Cindy is  patient daughter her phone #[phone]

## 2021-10-10 NOTE — P.HPIM
History of Present Illness


H&P Date: 10/09/21


Chief Complaint: AMS





76-year-old female with dementia, bipolar disorder schizophrenia, history of 

hypertension hyperlipidemia COPD





Patient is pleasantly confused she is alert oriented to place and person however

she is not sure why she is in the hospital





History obtained by reviewing medical records patient daughter brought the 

patient to the hospital for evaluation as she is unable to take care of her 

anymore.  Patient is becoming increasingly forgetful and unable to take care of 

her activities of daily living this has been declining gradually over the past 

few months.  Recently patient has been having frequent manic episodes she is 

refusing to take her medications as she is having some changes in her behavior





Today she found her mother may get and went try to help her she crawled under 

the bed and stayed there high being for many hours there is no reported falls 

however the patient does claim that she fell 3 days ago without going over 

details





Cindy is  patient daughter her phone #[phone]





Currently patient has no complaint





Upon evaluation in the ED she was found to have leukocytosis creatine kinase is 

elevated at around 2000.  Renal function unremarkable.


Troponin slightly elevated upon repeating normalized


Covid testing negative 


CT of the brain showed chronic vessel ischemia no acute pathology





Review of Systems


ROS unobtainable: due to mental status





Past Medical History


Past Medical History: Cancer, COPD, Dementia, GERD/Reflux, Hyperlipidemia, 

Hypertension, Osteoarthritis (OA), Renal Disease, Syncope


Additional Past Medical History / Comment(s): SYNCOPAL EPISODES RECENTLY, 

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

WITH REMOVAL.


History of Any Multi-Drug Resistant Organisms: None Reported


Past Surgical History: Hysterectomy, Tonsillectomy


Additional Past Surgical History / Comment(s): SKIN CANCER REMOVALS, 

COLONOSCOPY.


Past Anesthesia/Blood Transfusion Reactions: No Reported Reaction


Past Psychological History: Bipolar


Smoking Status: Current every day smoker


Past Alcohol Use History: None Reported


Past Drug Use History: None Reported





- Past Family History


  ** Mother


Additional Family Medical History / Comment(s): MOTHER HAD A "BAD HEART."





  ** Father


Additional Family Medical History / Comment(s): FATHER  IN A PLANE CRASH 

EARLIER IN LIFE.





Medications and Allergies


                                Home Medications











 Medication  Instructions  Recorded  Confirmed  Type


 


lisinopriL [Zestril] 5 mg PO DAILY 05/27/21 10/09/21 History


 


risperiDONE [RisperDAL] 2 mg PO HS 05/27/21 10/09/21 History


 


QUEtiapine [SEROquel] 25 mg PO BID 10/09/21 10/09/21 History








                                    Allergies











Allergy/AdvReac Type Severity Reaction Status Date / Time


 


No Known Allergies Allergy   Verified 10/09/21 12:27














Physical Exam


Vitals: 


                                   Vital Signs











  Temp Pulse Resp BP Pulse Ox


 


 10/09/21 20:13   81  18  143/92  98


 


 10/09/21 15:17   71  18  158/90  96


 


 10/09/21 13:37   69  18  188/93  96


 


 10/09/21 12:27   60  18  188/93  94 L


 


 10/09/21 11:50   64  18  186/91  98


 


 10/09/21 09:33  97.4 F L  69  16  197/102  98








                                Intake and Output











 10/09/21 10/09/21 10/09/21





 06:59 14:59 22:59


 


Other:   


 


  Weight  58.513 kg 














Constitutional:          No acute distress, conversant, pleasant, cooperative


Eyes:      Anicteric sclerae, moist conjunctiva, 


         Pupils equal round reactive to light





ENMT:      NC/AT


         Oropharynx clear, no erythema, or exudates





Neck:      Supple, no masses, or JVD


         No carotid bruits


         No thyromegaly





Lungs:      Clear to auscultation


         Clear to percussion


         Normal respiratory effort, no accessory muscle use 





Cardiovascular:      Heart regular in rate and rhythm, 


         No murmurs, gallops, or rubs


         No peripheral edema





Abdominal:       Soft


         Nontender, no guarding, rebound or rigidity


         Abdomen moving with respiration


         Normoactive bowel sounds


         No hepatomegaly, No splenomegaly


         No palpable mass 


         No abdominal wall hernia noted 





Skin:      Normal temperature, tone, texture, turgor


         No induration


         No subcutaneous nodules 


         No rash, lesions


         No ulcers





Extremities:      No digital cyanosis 


         No clubbing


         Pedal pulses intact and symmetrical


         Radial pulses intact and symmetrical 


         No calf tenderness 





Psychiatric:      Alert and oriented to person, place


         Appropriate affect


            


      


Neuro      Muscles Strength 4/5 in all 4 extremities 


         Sensation to light touch grossly present throughout


         Cranial nerves II-XII grossly intact


         No focal sensory deficits


Lymphatics:       no palpable cervical or supraclavicular , or inguinal lymph 

nodes  





Results


CBC & Chem 7: 


                                 10/09/21 11:16





                                 10/09/21 11:16


Labs: 


                  Abnormal Lab Results - Last 24 Hours (Table)











  10/09/21 10/09/21 10/09/21 Range/Units





  11:16 11:16 11:16 


 


WBC  13.6 H    (3.8-10.6)  k/uL


 


Hct  46.9 H    (34.0-46.0)  %


 


Neutrophils #  10.3 H    (1.3-7.7)  k/uL


 


Chloride    109 H  ()  mmol/L


 


BUN    24 H  (7-17)  mg/dL


 


Glucose    103 H  (74-99)  mg/dL


 


Total Bilirubin    1.8 H  (0.2-1.3)  mg/dL


 


AST    55 H  (14-36)  U/L


 


Creatine Kinase    2120 H*  ()  U/L


 


Troponin I     (0.000-0.034)  ng/mL


 


Total Protein    6.0 L  (6.3-8.2)  g/dL


 


Urine Appearance   Cloudy H   (Clear)  


 


Urine Protein   1+ H   (Negative)  


 


Urine Bacteria   Rare H   (None)  /hpf


 


Hyaline Casts   8 H   (0-2)  /lpf


 


Urine Mucus   Few H   (None)  /hpf


 


U Tricyclic Antidepress   Detected H   (NotDetected)  














  10/09/21 Range/Units





  11:16 


 


WBC   (3.8-10.6)  k/uL


 


Hct   (34.0-46.0)  %


 


Neutrophils #   (1.3-7.7)  k/uL


 


Chloride   ()  mmol/L


 


BUN   (7-17)  mg/dL


 


Glucose   (74-99)  mg/dL


 


Total Bilirubin   (0.2-1.3)  mg/dL


 


AST   (14-36)  U/L


 


Creatine Kinase   ()  U/L


 


Troponin I  0.049 H*  (0.000-0.034)  ng/mL


 


Total Protein   (6.3-8.2)  g/dL


 


Urine Appearance   (Clear)  


 


Urine Protein   (Negative)  


 


Urine Bacteria   (None)  /hpf


 


Hyaline Casts   (0-2)  /lpf


 


Urine Mucus   (None)  /hpf


 


U Tricyclic Antidepress   (NotDetected)  














Assessment and Plan


Assessment: 





Acute rhabdomyolysis


Renal function stable


IV fluid hydration with normal saline


Follow-up creatine kinase





Progressive dementia


Behavioral changes


History of bipolar disorder and


Frequent manic episodes


Medical noncompliance


Difficulties with activities of daily living


CT of the brain showed chronic vascular changes


Psych eval





Hypertensive urgency


Secondary to medication noncompliance


Resume blood pressure medications





Hyperlipidemia resume statin





Patient is full code


Fall precautions


Patient daughter requesting placement as she is unable to take care of her 

mother anymore


DVT prophylaxis mechanical








Cindy is  patient daughter her phone #[phone]

## 2021-10-11 RX ADMIN — CEFAZOLIN SCH MLS/HR: 330 INJECTION, POWDER, FOR SOLUTION INTRAMUSCULAR; INTRAVENOUS at 01:53

## 2021-10-11 RX ADMIN — DIVALPROEX SODIUM SCH MG: 250 TABLET, FILM COATED, EXTENDED RELEASE ORAL at 21:12

## 2021-10-11 RX ADMIN — PANTOPRAZOLE SODIUM SCH MG: 40 TABLET, DELAYED RELEASE ORAL at 08:25

## 2021-10-11 RX ADMIN — DIVALPROEX SODIUM SCH MG: 250 TABLET, FILM COATED, EXTENDED RELEASE ORAL at 15:11

## 2021-10-11 RX ADMIN — CEFAZOLIN SCH MLS/HR: 330 INJECTION, POWDER, FOR SOLUTION INTRAMUSCULAR; INTRAVENOUS at 12:18

## 2021-10-11 RX ADMIN — CEFAZOLIN SCH MLS/HR: 330 INJECTION, POWDER, FOR SOLUTION INTRAMUSCULAR; INTRAVENOUS at 05:40

## 2021-10-11 RX ADMIN — CEFAZOLIN SCH MLS/HR: 330 INJECTION, POWDER, FOR SOLUTION INTRAMUSCULAR; INTRAVENOUS at 22:12

## 2021-10-11 NOTE — P.CN
Psychiatric Consult





- .


Consult date: 10/11/21


Consult:: 





10/11/21 13:31


IDENTIFYING DATA: This patient is a 76-year-old  female with a history 

of dementia and bipolar disorder who has 1 daughter and lives with her in house.





REASON FOR REFERRAL: Psychiatry was consulted for "history of bipolar and AMS"





HISTORY OF PRESENT ILLNESS: The patient presented to the hospital on 10/9 as she

has a history of bipolar disorder and dementia.  Apparently patient has not been

taking her medications and not taking care of herself at home according to 

family.  Patient was also described as having "manic episodes".  She was 

apparently found naked and crawled underneath the bed prior to coming into the 

hospital.  Patient had elevated white cell count and elevated neutrophil count. 

Her CK was elevated at 2120 and also had elevated troponins.  Her UDS was 

positive for TCAs.  Computed tomography scan of the brain showed chronic small 

vessel ischemia and age-related atrophy.  There is taking care patient states 

that she was fairly cooperative today and appeared to be somewhat tired during 

the day possibly related to her Seroquel.  She also claimed that patient has 

been taking her medications and has been fairly cooperative.  Patient was seen 

laying in the bed and agreeable to speak to writer.  Patient claims that she had

a "fall in the house".  She claims that she was trying to get on her bed however

ended up losing her balance and fell on the floor.  She states that she was 

laying there for "hours".  She states that she was brought in the hospital 

afterwards.  Patient was mildly confused and did not know where she was she 

believed that she was in a "branch".  She did not know today's date and she 

believes that "Arvin" is the current president.  She did know her full name 

and her age and birthdate.  She is denying any depression at this time.  She 

appeared to have fair concentration.  She is denying any anxiety.  She claims 

that her sleep has been fair.  She is denying any paranoia not endorsing any 

delusions.  She claims that she was taking her regular medications prior to 

coming in the hospital and is compliant with them now. At this time patient 

denies any suicidal or homical ideations, intent or plan. Patient denies any 

auditory, visual hallucinations and denies any paranoia or delusions. Patients 

admits to using cigarettes only





PAST PSYCHIATRIC HISTORY: Patient has a a history of bipolar disorder and 

possibly dementia.  Patient was previously on Risperdal and Seroquel.  She 

claims that she's been admitted psychiatrically several several times in the 

past.  Patient denies any psychiatric outpatient follow-up.  His SEG used to 

follow up with her outpatient psychiatrist before he moved away Dr. Rey. 

Patient denies any history of suicide attempts in the past.





Past Medical History: Cancer, COPD, Dementia, GERD/Reflux, Hyperlipidemia, 

Hypertension, Osteoarthritis (OA), Renal Disease, Syncope


Additional Past Medical History / Comment(s): SYNCOPAL EPISODES RECENTLY, 

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

WITH REMOVAL. 





ALLERGIES: as per EMR. 





CHEMICAL DEPENDENCY HISTORY: as per HPI. 





FAMILY PSYCHIATRIC/SUBSTANCE USE HISTORY: She states that her daughter has some 

form of mental illness.





SOCIAL HISTORY: Patient was born and raised in [facility].  She states 

that she did some college.  She claims that she worked as a  for her 

's company.  She denies any legal history.  She currently lives with her 

daughter in a house.





MENTAL STATUS EXAM: 


General Appearance: Patient appears to be stated age is alert, confused at times

however attempts to cooperate.  Directable.  Patient appears to have fair 

hygiene and grooming wearing hospital gown with fair eye contact.


Behavior: Patient is calmly lying in bed without any agitated behavior.


Speech: Patient's speech is fluent and nonpressured.  Rambles at times


Mood/Affect: Patient reports their mood is "ok", affect is congruent


Suicidality/Homicidality:  Patient denies having any suicidal or homicidal 

ideation intent or plan.  


Perceptions: Patient denies any visual hallucinations and denies any auditory 

hallucinations  


Though content/process: She rambles at times.  Tangential.  For the most part 

logical.


Memory and concentration: AOX1, to her name only, Can spell "WORLD" backwards.  

She does not know who the current president is.  Fair attention span.


Judgment and insight: Chronically poor





IMPRESSIONS: 


Delirium, unknown etiology, resolving


hx of dementia


hx of bipolar disorder


nicotine dependence





PLAN: 


-At this time patient DOES NOT meet criteria for inpatient psychiatric 

admission.


-Delirium precautions recommended with patient including - avoiding use of 

narcotics and CNS sedatives, limit anticholinergic medications when possible, 

frequent re-orientation, minimize use of restraints, open window shades during 

the day and close them at night


-Would recommend the following medication changes/additions: Discontinued 

Seroquel.  Will start Depakote 250 mg twice a day for mood stabilization.  

Continue with Risperdal 2 mg daily at bedtime.  Will add melatonin 5 mg daily at

bedtime when necessary for insomnia.


- to provide patient with outpatient mental health/psychiatry 

resources for appropriate follow up upon discharge


-Writer attempted to call patient's daughter Cindy at the number given 

[phone] however no answer and unable to leave voicemail.


- to look into placement at this time  


-Communicated plan to patient's nurse


-Will continue to follow along


-Please contact with any questions.











10/11/21 13:49

## 2021-10-11 NOTE — P.PN
Subjective


Progress Note Date: 10/11/21





No new complaints today.  Pt has an inguinal hernia going into left labia, 

reducible.





Objective





- Vital Signs


Vital signs: 


                                   Vital Signs











Temp  98.3 F   10/11/21 13:00


 


Pulse  86   10/11/21 13:00


 


Resp  18   10/11/21 13:00


 


BP  153/82   10/11/21 13:00


 


Pulse Ox  95   10/11/21 13:00








                                 Intake & Output











 10/10/21 10/11/21 10/11/21





 18:59 06:59 18:59


 


Intake Total 240  240


 


Balance 240  240


 


Weight 58.513 kg  


 


Intake:   


 


  Oral 240  240


 


Other:   


 


  Voiding Method Toilet Toilet Toilet


 


  # Voids 3 3 


 


  # Bowel Movements  0 














- Exam





Gen: awake, alert


HEENT: normocephalic, atraumatic, good hearing acuity, moist mucous membranes


Resp: good air exchange, breathing comfortably with no accessory muscle use


CVS: good distal perfusion x 4,


GI: soft, NTTP, ND


: no SPT, no CVAT, colmenares catheter not present, left inguinal hernia, reducible


MSK: no pitting edema, no clubbing


Neuro: non-focal, moving all extremities


Psych: cooperative, euthymic mood 





- Labs


CBC & Chem 7: 


                                 10/10/21 06:29





                                 10/10/21 06:29





Assessment and Plan


Assessment: 





Acute rhabdomyolysis


Renal function stable


IV fluid hydration with normal saline


Follow-up creatine kinase





Progressive dementia


Behavioral changes


History of bipolar disorder and


Frequent manic episodes


Medical noncompliance


Difficulties with activities of daily living


CT of the brain showed chronic vascular changes


Psych eval





Hypertensive urgency


Secondary to medication noncompliance


Resume blood pressure medications





Hyperlipidemia resume statin





Patient is full code


Fall precautions


Patient daughter requesting placement as she is unable to take care of her mot

her anymore


DVT prophylaxis mechanical








Cindy is  patient daughter her phone #[phone]

## 2021-10-12 VITALS
RESPIRATION RATE: 18 BRPM | TEMPERATURE: 98.3 F | SYSTOLIC BLOOD PRESSURE: 167 MMHG | DIASTOLIC BLOOD PRESSURE: 80 MMHG | HEART RATE: 69 BPM

## 2021-10-12 RX ADMIN — PANTOPRAZOLE SODIUM SCH MG: 40 TABLET, DELAYED RELEASE ORAL at 07:24

## 2021-10-12 RX ADMIN — DIVALPROEX SODIUM SCH MG: 250 TABLET, FILM COATED, EXTENDED RELEASE ORAL at 07:23

## 2021-10-12 RX ADMIN — CEFAZOLIN SCH MLS/HR: 330 INJECTION, POWDER, FOR SOLUTION INTRAMUSCULAR; INTRAVENOUS at 07:25

## 2021-10-12 NOTE — P.DS
Providers


Date of admission: 


10/09/21 13:05





Expected date of discharge: 10/12/21


Attending physician: 


Naeem Calhoun MD





Consults: 





                                        





10/09/21 14:01


Consult Physician Stat 


   Consulting Provider: Simón Patino


   Consult Reason/Comments: history of bipolar, AMS


   Do you want consulting provider notified?: Already Contacted











Primary care physician: 


Duc ZELAYA [facility] Course: 





Acute rhabdomyolysis


Patient admitted for rhabdomyolysis, which improved rapidly with IVF.  F/u CK 

was in 1000s, with anticipated ongoing improvement.  Pt was able to ambulate on 

her own with min assistance, but did appear unsteady in terms of gait with high 

fall risk.  PT saw patient and agreed patient would benefit from SNF for rehab 

and dementia supervision.





Progressive dementia


Behavioral changes


History of bipolar disorder and


Frequent manic episodes


CT of the brain demonstrated atrophic changes.  Psych was consulted and made 

medication changes reflected in the MAR.  On discharge patient's mentation 

significantly improved.








Hypertensive urgency


Resumed blood pressure medications with improvement





Hyperlipidemia resumed statin





Inguinal Hernia


Patient has inguinal hernia on the left which was reducible.  If this becomes 

further issue, needs surgery eval.


Assessment: 


Gen: awake, alert


HEENT: normocephalic, atraumatic, good hearing acuity, moist mucous membranes


Resp: good air exchange, breathing comfortably with no accessory muscle use


CVS: good distal perfusion x 4,


GI: soft, NTTP, ND


: no SPT, no CVAT, colmenares catheter not present, left inguinal hernia, reducible


MSK: no pitting edema, no clubbing


Neuro: non-focal, moving all extremities


Psych: cooperative, euthymic mood 


Patient Condition at Discharge: Good





Plan - Discharge Summary


Discharge Rx Participant: No


New Discharge Prescriptions: 


New


   RX: Divalproex ER [Depakote ER] 250 mg PO BID #60 tab





Continue


   RX: risperiDONE [RisperDAL] 2 mg PO HS


   RX: lisinopriL [Zestril] 5 mg PO DAILY





Discontinued


   QUEtiapine [SEROquel] 25 mg PO BID


Discharge Medication List





RX: lisinopriL [Zestril] 5 mg PO DAILY 05/27/21 [History]


RX: risperiDONE [RisperDAL] 2 mg PO HS 05/27/21 [History]


RX: Divalproex ER [Depakote ER] 250 mg PO BID #60 tab 10/12/21 [Rx]








Follow up Appointment(s)/Referral(s): 


Duc Rosas MD [Primary Care Provider] - 1-2 days


Activity/Diet/Wound Care/Special Instructions: 


Elopement risk - clothes in locker #20 - key in chart


Discharge Disposition: HOME SELF-CARE

## 2021-10-12 NOTE — P.PN
Progress Note - Text


Progress Note Date: 10/12/21





Interval History:


Patient was seen today for psychiatric follow-up regarding patient's delirium 

and psychiatric condition.  Patient has been noted to be taking her medications 

as prescribed.  Patient's nurse taking care of patient states that she has been 

doing better today in terms of her behavior and is less confused and also slept 

at nighttime.  Patient has been taking her medications as prescribed.  Patient 

was seen in her room today by writer and appeared to be awake and was fairly 

cooperative during conversation.  She is fairly appropriate however was concrete

at times with her answers.  She denied any new complaints since yesterday.  She 

states that she feels more awake today and denies any depression or anxiety.  

She was more future oriented and knew that she was going to [place].  She was 

alert and oriented 2 today however did not know today's full date only knew 

that she was in October 2021.  She claims that she has a fair appetite today.  

At this time patient denies any suicidal or homical ideations, intent or plan. 

Patient denies any auditory, visual hallucinations and denies any paranoia or 

delusions. Patient denies any side effects from the medications and has been 

compliant with meds. 





Mental Status Exam:


General Appearance: Patient appears to be stated age is alert, much less 

confused today and attempts to cooperate.  Directable.  Patient appears to have 

fair hygiene and grooming wearing hospital gown with fair eye contact.


Behavior: Patient is calmly lying in bed without any agitated behavior.  More 

cooperative today


Speech: Patient's speech is fluent and nonpressured.  


Mood/Affect: Patient reports their mood is "good", affect is congruent


Suicidality/Homicidality:  Patient denies having any suicidal or homicidal 

ideation intent or plan.  


Perceptions: Patient denies any visual hallucinations and denies any auditory 

hallucinations  


Though content/process: More goal oriented.  Logical.  Future oriented.


Memory and concentration: AOX2, she knows that it is October 2021 however does 

not know today's full date.  She does not know who the current president is.  

Fair attention span.


Judgment and insight: Chronically poor, improving





Assessment


Delirium, unknown etiology, resolved


hx of dementia


hx of bipolar disorder


nicotine dependence





Plan:


-At this time patient DOES NOT meet criteria for inpatient psychiatric 

admission.


-Delirium precautions recommended with patient including - avoiding use of 

narcotics and CNS sedatives, limit anticholinergic medications when possible, 

frequent re-orientation, minimize use of restraints, open window shades during 

the day and close them at night


-Would recommend the following medication changes/additions: Continue with 

Depakote 250 mg twice a day for mood stabilization.  Continue with Risperdal 2 

mg daily at bedtime.  melatonin 5 mg daily at bedtime when necessary for 

insomnia.


- to provide patient with outpatient mental health/psychiatry 

resources for appropriate follow up upon discharge


-Patient will be discharged today to [facility]


-Communicated plan to patient's nurse


-At this time psychiatry will sign off.


-Please contact with any questions.

## 2022-11-01 ENCOUNTER — HOSPITAL ENCOUNTER (EMERGENCY)
Dept: HOSPITAL 47 - EC | Age: 77
Discharge: HOME | End: 2022-11-01
Payer: MEDICARE

## 2022-11-01 VITALS
TEMPERATURE: 98.2 F | RESPIRATION RATE: 18 BRPM | SYSTOLIC BLOOD PRESSURE: 117 MMHG | DIASTOLIC BLOOD PRESSURE: 67 MMHG | HEART RATE: 76 BPM

## 2022-11-01 DIAGNOSIS — Z79.811: ICD-10-CM

## 2022-11-01 DIAGNOSIS — I82.402: Primary | ICD-10-CM

## 2022-11-01 DIAGNOSIS — M19.90: ICD-10-CM

## 2022-11-01 DIAGNOSIS — F17.200: ICD-10-CM

## 2022-11-01 DIAGNOSIS — E78.5: ICD-10-CM

## 2022-11-01 DIAGNOSIS — Z79.899: ICD-10-CM

## 2022-11-01 DIAGNOSIS — F31.9: ICD-10-CM

## 2022-11-01 DIAGNOSIS — J44.1: ICD-10-CM

## 2022-11-01 DIAGNOSIS — K21.9: ICD-10-CM

## 2022-11-01 DIAGNOSIS — I10: ICD-10-CM

## 2022-11-01 LAB
ALBUMIN SERPL-MCNC: 3.6 G/DL (ref 3.5–5)
ALP SERPL-CCNC: 98 U/L (ref 38–126)
ALT SERPL-CCNC: 17 U/L (ref 4–34)
ANION GAP SERPL CALC-SCNC: 10 MMOL/L
AST SERPL-CCNC: 23 U/L (ref 14–36)
BASOPHILS # BLD AUTO: 0.1 K/UL (ref 0–0.2)
BASOPHILS NFR BLD AUTO: 1 %
BUN SERPL-SCNC: 22 MG/DL (ref 7–17)
CALCIUM SPEC-MCNC: 9 MG/DL (ref 8.4–10.2)
CHLORIDE SERPL-SCNC: 102 MMOL/L (ref 98–107)
CO2 SERPL-SCNC: 23 MMOL/L (ref 22–30)
EOSINOPHIL # BLD AUTO: 0.1 K/UL (ref 0–0.7)
EOSINOPHIL NFR BLD AUTO: 1 %
ERYTHROCYTE [DISTWIDTH] IN BLOOD BY AUTOMATED COUNT: 4.77 M/UL (ref 3.8–5.4)
ERYTHROCYTE [DISTWIDTH] IN BLOOD: 14.2 % (ref 11.5–15.5)
GLUCOSE SERPL-MCNC: 157 MG/DL (ref 74–99)
HCT VFR BLD AUTO: 42.5 % (ref 34–46)
HGB BLD-MCNC: 14.2 GM/DL (ref 11.4–16)
LYMPHOCYTES # SPEC AUTO: 1.9 K/UL (ref 1–4.8)
LYMPHOCYTES NFR SPEC AUTO: 20 %
MCH RBC QN AUTO: 29.7 PG (ref 25–35)
MCHC RBC AUTO-ENTMCNC: 33.3 G/DL (ref 31–37)
MCV RBC AUTO: 89.2 FL (ref 80–100)
MONOCYTES # BLD AUTO: 0.7 K/UL (ref 0–1)
MONOCYTES NFR BLD AUTO: 7 %
NEUTROPHILS # BLD AUTO: 6.7 K/UL (ref 1.3–7.7)
NEUTROPHILS NFR BLD AUTO: 70 %
PLATELET # BLD AUTO: 189 K/UL (ref 150–450)
POTASSIUM SERPL-SCNC: 4.5 MMOL/L (ref 3.5–5.1)
PROT SERPL-MCNC: 6.2 G/DL (ref 6.3–8.2)
SODIUM SERPL-SCNC: 135 MMOL/L (ref 137–145)
WBC # BLD AUTO: 9.5 K/UL (ref 3.8–10.6)

## 2022-11-01 PROCEDURE — 83605 ASSAY OF LACTIC ACID: CPT

## 2022-11-01 PROCEDURE — 36415 COLL VENOUS BLD VENIPUNCTURE: CPT

## 2022-11-01 PROCEDURE — 85025 COMPLETE CBC W/AUTO DIFF WBC: CPT

## 2022-11-01 PROCEDURE — 80053 COMPREHEN METABOLIC PANEL: CPT

## 2022-11-01 PROCEDURE — 99284 EMERGENCY DEPT VISIT MOD MDM: CPT

## 2022-11-01 NOTE — ED
General Adult HPI





- General


Chief complaint: Extremity Injury, Lower


Stated complaint: left leg infection


Time Seen by Provider: 22 13:59


Source: patient, RN notes reviewed


Mode of arrival: EMS


Limitations: physical limitation





- History of Present Illness


Initial comments: 





Patient is a 77 year old female presenting to the ER via EMS from [facility]. 

Patient has altered mental status which is baseline per EMS. She reports her 

left leg became red and painful about 2 days ago. She denies new lotions, creams

or injuries to the leg. She denies fevers, chills, or nightsweats. 





- Related Data


                                Home Medications











 Medication  Instructions  Recorded  Confirmed


 


lisinopriL [Zestril] 5 mg PO DAILY 05/27/21 10/09/21


 


risperiDONE [RisperDAL] 2 mg PO HS 05/27/21 10/09/21








                                  Previous Rx's











 Medication  Instructions  Recorded


 


Divalproex ER [Depakote ER] 250 mg PO BID #60 tab 10/12/21


 


Apixaban [Eliquis Starter Pack 0 mg PO AS DIRECTED 30 Days #1 22





(for VTE)] packet 











                                    Allergies











Allergy/AdvReac Type Severity Reaction Status Date / Time


 


No Known Allergies Allergy   Verified 22 14:03














Review of Systems


ROS Statement: 


Those systems with pertinent positive or pertinent negative responses have been 

documented in the HPI.





ROS Other: All systems not noted in ROS Statement are negative.





Past Medical History


Past Medical History: Cancer, COPD, Dementia, GERD/Reflux, Hyperlipidemia, 

Hypertension, Osteoarthritis (OA), Renal Disease, Syncope


Additional Past Medical History / Comment(s): SYNCOPAL EPISODES RECENTLY, 

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

WITH REMOVAL.


History of Any Multi-Drug Resistant Organisms: None Reported


Past Surgical History: Hysterectomy, Tonsillectomy


Additional Past Surgical History / Comment(s): SKIN CANCER REMOVALS, 

COLONOSCOPY.


Past Anesthesia/Blood Transfusion Reactions: No Reported Reaction


Past Psychological History: Bipolar


Smoking Status: Current every day smoker


Past Alcohol Use History: None Reported


Past Drug Use History: None Reported





- Past Family History


  ** Mother


Additional Family Medical History / Comment(s): MOTHER HAD A "BAD HEART."





  ** Father


Additional Family Medical History / Comment(s): FATHER  IN A PLANE CRASH 

EARLIER IN LIFE.





General Exam


Limitations: altered mental status, physical limitation


General appearance: alert, in no apparent distress


Head exam: Present: atraumatic, normocephalic, normal inspection


Eye exam: Present: normal appearance, PERRL, EOMI.  Absent: scleral icterus, 

conjunctival injection, periorbital swelling


ENT exam: Present: normal exam, mucous membranes moist


Neck exam: Present: normal inspection.  Absent: tenderness, meningismus, 

lymphadenopathy


Respiratory exam: Present: normal lung sounds bilaterally.  Absent: respiratory 

distress, wheezes, rales, rhonchi, stridor


Cardiovascular Exam: Present: regular rate, normal rhythm, normal heart sounds, 

other (1+ dorsalis pedis pulse ).  Absent: systolic murmur, diastolic murmur, 

rubs, gallop, clicks


GI/Abdominal exam: Present: soft, normal bowel sounds.  Absent: distended, 

tenderness, guarding, rebound, rigid


Extremities exam: Present: normal inspection, full ROM, normal capillary refill.

 Absent: tenderness, pedal edema, joint swelling, calf tenderness


Back exam: Present: normal inspection


Neurological exam: Present: alert, oriented X3, CN II-XII intact


Psychiatric exam: Present: normal affect, normal mood


Skin exam: Present: warm (left LE ), erythema (left LE., 3/4 of leg affected ), 

other (1+ pitting edema)





Course


                                   Vital Signs











  22





  13:59


 


Temperature 98.8 F


 


Pulse Rate 75


 


Respiratory 16





Rate 


 


Blood Pressure 112/83


 


O2 Sat by Pulse 96





Oximetry 














Medical Decision Making





- Medical Decision Making


77-year-old female presented for left leg redness and swelling.  Patient has 

DVT.  Patient has no current chest pain or shortness of breath patient will be 

started on L course initial dose given in emergency department.  I did discuss 

the case with  who will closely monitor patient








- Lab Data


Result diagrams: 


                                 22 14:20





                                 22 14:20


                                   Lab Results











  22 Range/Units





  14:20 14:20 14:20 


 


WBC  9.5    (3.8-10.6)  k/uL


 


RBC  4.77    (3.80-5.40)  m/uL


 


Hgb  14.2    (11.4-16.0)  gm/dL


 


Hct  42.5    (34.0-46.0)  %


 


MCV  89.2    (80.0-100.0)  fL


 


MCH  29.7    (25.0-35.0)  pg


 


MCHC  33.3    (31.0-37.0)  g/dL


 


RDW  14.2    (11.5-15.5)  %


 


Plt Count  189    (150-450)  k/uL


 


MPV  9.3    


 


Neutrophils %  70    %


 


Lymphocytes %  20    %


 


Monocytes %  7    %


 


Eosinophils %  1    %


 


Basophils %  1    %


 


Neutrophils #  6.7    (1.3-7.7)  k/uL


 


Lymphocytes #  1.9    (1.0-4.8)  k/uL


 


Monocytes #  0.7    (0-1.0)  k/uL


 


Eosinophils #  0.1    (0-0.7)  k/uL


 


Basophils #  0.1    (0-0.2)  k/uL


 


Sodium   135 L   (137-145)  mmol/L


 


Potassium   4.5   (3.5-5.1)  mmol/L


 


Chloride   102   ()  mmol/L


 


Carbon Dioxide   23   (22-30)  mmol/L


 


Anion Gap   10   mmol/L


 


BUN   22 H   (7-17)  mg/dL


 


Creatinine   0.79   (0.52-1.04)  mg/dL


 


Est GFR (CKD-EPI)AfAm   83   (>60 ml/min/1.73 sqM)  


 


Est GFR (CKD-EPI)NonAf   72   (>60 ml/min/1.73 sqM)  


 


Glucose   157 H   (74-99)  mg/dL


 


Plasma Lactic Acid Christian    1.5  (0.7-2.0)  mmol/L


 


Calcium   9.0   (8.4-10.2)  mg/dL


 


Total Bilirubin   1.4 H   (0.2-1.3)  mg/dL


 


AST   23   (14-36)  U/L


 


ALT   17   (4-34)  U/L


 


Alkaline Phosphatase   98   ()  U/L


 


Total Protein   6.2 L   (6.3-8.2)  g/dL


 


Albumin   3.6   (3.5-5.0)  g/dL














Disposition


Clinical Impression: 


 Left leg DVT





Disposition: HOME SELF-CARE


Condition: Stable


Instructions (If sedation given, give patient instructions):  Deep Vein 

Thrombosis (ED)


Additional Instructions: 


Please return to the Emergency Department if symptoms worsen or any other 

concerns.


Prescriptions: 


Apixaban [Eliquis Starter Pack (for VTE)] 0 mg PO AS DIRECTED 30 Days #1 packet


Is patient prescribed a controlled substance at d/c from ED?: No


Referrals: 


Duc Rosas MD [Primary Care Provider] - 1-2 days


Time of Disposition: 15:23

## 2022-11-01 NOTE — US
EXAMINATION TYPE: US venous doppler duplex LE LT

 

DATE OF EXAM: 11/1/2022 2:53 PM

 

COMPARISON: NONE

 

CLINICAL HISTORY: pain.

 

SIDE PERFORMED: Left  

 

TECHNIQUE:  The lower extremity deep venous system is examined utilizing real time linear array sonog
yue with graded compression, doppler sonography and color-flow sonography.

 

VESSELS IMAGED:

Common Femoral Vein

Deep Femoral Vein

Greater Saphenous Vein *

Femoral Vein

Popliteal Vein

Small Saphenous Vein *

Proximal Calf Veins

(* superficial vessels)

 

 

 

 

 

Left Leg:  Positive for DVT extending from CFV through proximal calf veins. 

 

 

 

IMPRESSION:

1. Examination is positive for DVT in the common femoral vein through the calf veins.

## 2022-11-15 ENCOUNTER — HOSPITAL ENCOUNTER (INPATIENT)
Dept: HOSPITAL 47 - EC | Age: 77
LOS: 2 days | Discharge: NURSING, REHAB OR LONG-TERM CARE | DRG: 871 | End: 2022-11-17
Attending: INTERNAL MEDICINE | Admitting: INTERNAL MEDICINE
Payer: MEDICARE

## 2022-11-15 DIAGNOSIS — A41.89: Primary | ICD-10-CM

## 2022-11-15 DIAGNOSIS — N39.0: ICD-10-CM

## 2022-11-15 DIAGNOSIS — R53.81: ICD-10-CM

## 2022-11-15 DIAGNOSIS — Z90.710: ICD-10-CM

## 2022-11-15 DIAGNOSIS — Z82.49: ICD-10-CM

## 2022-11-15 DIAGNOSIS — E78.5: ICD-10-CM

## 2022-11-15 DIAGNOSIS — I10: ICD-10-CM

## 2022-11-15 DIAGNOSIS — Z85.828: ICD-10-CM

## 2022-11-15 DIAGNOSIS — E43: ICD-10-CM

## 2022-11-15 DIAGNOSIS — G25.0: ICD-10-CM

## 2022-11-15 DIAGNOSIS — G93.41: ICD-10-CM

## 2022-11-15 DIAGNOSIS — Z87.442: ICD-10-CM

## 2022-11-15 DIAGNOSIS — Z79.899: ICD-10-CM

## 2022-11-15 DIAGNOSIS — J44.9: ICD-10-CM

## 2022-11-15 DIAGNOSIS — Z79.01: ICD-10-CM

## 2022-11-15 DIAGNOSIS — F17.200: ICD-10-CM

## 2022-11-15 DIAGNOSIS — R55: ICD-10-CM

## 2022-11-15 DIAGNOSIS — I82.412: ICD-10-CM

## 2022-11-15 DIAGNOSIS — F31.9: ICD-10-CM

## 2022-11-15 DIAGNOSIS — F03.93: ICD-10-CM

## 2022-11-15 LAB
ALBUMIN SERPL-MCNC: 3.2 G/DL (ref 3.5–5)
ALP SERPL-CCNC: 80 U/L (ref 38–126)
ALT SERPL-CCNC: 56 U/L (ref 4–34)
ANION GAP SERPL CALC-SCNC: 3 MMOL/L
APTT BLD: 31 SEC (ref 22–30)
AST SERPL-CCNC: 78 U/L (ref 14–36)
BASOPHILS # BLD AUTO: 0 K/UL (ref 0–0.2)
BASOPHILS NFR BLD AUTO: 0 %
BUN SERPL-SCNC: 22 MG/DL (ref 7–17)
CALCIUM SPEC-MCNC: 8.8 MG/DL (ref 8.4–10.2)
CHLORIDE SERPL-SCNC: 107 MMOL/L (ref 98–107)
CO2 SERPL-SCNC: 25 MMOL/L (ref 22–30)
EOSINOPHIL # BLD AUTO: 0.1 K/UL (ref 0–0.7)
EOSINOPHIL NFR BLD AUTO: 1 %
ERYTHROCYTE [DISTWIDTH] IN BLOOD BY AUTOMATED COUNT: 4.45 M/UL (ref 3.8–5.4)
ERYTHROCYTE [DISTWIDTH] IN BLOOD: 13.6 % (ref 11.5–15.5)
GLUCOSE SERPL-MCNC: 173 MG/DL (ref 74–99)
HCT VFR BLD AUTO: 40.2 % (ref 34–46)
HGB BLD-MCNC: 13.2 GM/DL (ref 11.4–16)
HYALINE CASTS UR QL AUTO: 8 /LPF (ref 0–2)
INR PPP: 1.1 (ref ?–1.2)
LYMPHOCYTES # SPEC AUTO: 1.4 K/UL (ref 1–4.8)
LYMPHOCYTES NFR SPEC AUTO: 10 %
MCH RBC QN AUTO: 29.7 PG (ref 25–35)
MCHC RBC AUTO-ENTMCNC: 32.9 G/DL (ref 31–37)
MCV RBC AUTO: 90.2 FL (ref 80–100)
MONOCYTES # BLD AUTO: 0.7 K/UL (ref 0–1)
MONOCYTES NFR BLD AUTO: 4 %
NEUTROPHILS # BLD AUTO: 12.5 K/UL (ref 1.3–7.7)
NEUTROPHILS NFR BLD AUTO: 84 %
PH UR: 6.5 [PH] (ref 5–8)
PLATELET # BLD AUTO: 343 K/UL (ref 150–450)
POTASSIUM SERPL-SCNC: 5.3 MMOL/L (ref 3.5–5.1)
PROT SERPL-MCNC: 6 G/DL (ref 6.3–8.2)
PROT UR QL: (no result)
PT BLD: 11.9 SEC (ref 9–12)
RBC UR QL: 158 /HPF (ref 0–5)
SODIUM SERPL-SCNC: 135 MMOL/L (ref 137–145)
SP GR UR: 1.02 (ref 1–1.03)
UROBILINOGEN UR QL STRIP: 3 MG/DL (ref ?–2)
WBC # BLD AUTO: 14.9 K/UL (ref 3.8–10.6)
WBC # UR AUTO: >182 /HPF (ref 0–5)

## 2022-11-15 PROCEDURE — 85610 PROTHROMBIN TIME: CPT

## 2022-11-15 PROCEDURE — 70450 CT HEAD/BRAIN W/O DYE: CPT

## 2022-11-15 PROCEDURE — 99285 EMERGENCY DEPT VISIT HI MDM: CPT

## 2022-11-15 PROCEDURE — 94760 N-INVAS EAR/PLS OXIMETRY 1: CPT

## 2022-11-15 PROCEDURE — 85379 FIBRIN DEGRADATION QUANT: CPT

## 2022-11-15 PROCEDURE — 87086 URINE CULTURE/COLONY COUNT: CPT

## 2022-11-15 PROCEDURE — 87186 SC STD MICRODIL/AGAR DIL: CPT

## 2022-11-15 PROCEDURE — 87077 CULTURE AEROBIC IDENTIFY: CPT

## 2022-11-15 PROCEDURE — 71045 X-RAY EXAM CHEST 1 VIEW: CPT

## 2022-11-15 PROCEDURE — 93306 TTE W/DOPPLER COMPLETE: CPT

## 2022-11-15 PROCEDURE — 36415 COLL VENOUS BLD VENIPUNCTURE: CPT

## 2022-11-15 PROCEDURE — 80053 COMPREHEN METABOLIC PANEL: CPT

## 2022-11-15 PROCEDURE — 85730 THROMBOPLASTIN TIME PARTIAL: CPT

## 2022-11-15 PROCEDURE — 80074 ACUTE HEPATITIS PANEL: CPT

## 2022-11-15 PROCEDURE — 96361 HYDRATE IV INFUSION ADD-ON: CPT

## 2022-11-15 PROCEDURE — 71275 CT ANGIOGRAPHY CHEST: CPT

## 2022-11-15 PROCEDURE — 80306 DRUG TEST PRSMV INSTRMNT: CPT

## 2022-11-15 PROCEDURE — 93005 ELECTROCARDIOGRAM TRACING: CPT

## 2022-11-15 PROCEDURE — 84484 ASSAY OF TROPONIN QUANT: CPT

## 2022-11-15 PROCEDURE — 85025 COMPLETE CBC W/AUTO DIFF WBC: CPT

## 2022-11-15 PROCEDURE — 82140 ASSAY OF AMMONIA: CPT

## 2022-11-15 PROCEDURE — 96365 THER/PROPH/DIAG IV INF INIT: CPT

## 2022-11-15 PROCEDURE — 81001 URINALYSIS AUTO W/SCOPE: CPT

## 2022-11-15 RX ADMIN — APIXABAN SCH MG: 5 TABLET, FILM COATED ORAL at 22:46

## 2022-11-15 NOTE — ED
Altered Mental Status HPI





- General


Chief Complaint: Altered Mental Status


Stated Complaint: Altered Mental Status


Time Seen by Provider: 11/15/22 20:50


Source: EMS


Mode of arrival: EMS





- History of Present Illness


Initial Comments: 





Patient presents with altered mental status.  Her nursing home reported that she

had a syncopal episode and was cyanotic.  Patient doesn't answer any questions. 

She provides no further information.





- Related Data


                                Home Medications











 Medication  Instructions  Recorded  Confirmed


 


risperiDONE [RisperDAL] 2 mg PO HS 21


 


Acetaminophen [Acetaminophen ER] 650 mg PO Q6H PRN 22


 


Cholecalciferol [Vitamin D3 (125 125 mcg PO DAILY 22





Mcg = 5000 Iu)]   


 


Magnesium Hydroxide [Milk of 2,400 mg PO DAILY PRN 22





Magnesia]   


 


Mirtazapine 7.5 mg PO HS@22


 


bisacodyL 10 mg RECTAL DAILY PRN 22


 


lisinopriL [Zestril] 10 mg PO DAILY 22








                                  Previous Rx's











 Medication  Instructions  Recorded


 


Apixaban [Eliquis Starter Pack 0 mg PO AS DIRECTED 30 Days #1 22





(for VTE)] packet 











                                    Allergies











Allergy/AdvReac Type Severity Reaction Status Date / Time


 


No Known Allergies Allergy   Verified 11/15/22 20:19














Review of Systems


ROS Statement: 


Those systems with pertinent positive or pertinent negative responses have been 

documented in the HPI.





ROS Other: All systems not noted in ROS Statement are negative.





Past Medical History


Past Medical History: Cancer, COPD, Dementia, GERD/Reflux, Hyperlipidemia, 

Hypertension, Osteoarthritis (OA), Renal Disease, Syncope


Additional Past Medical History / Comment(s): SYNCOPAL EPISODES RECENTLY, 

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

WITH REMOVAL.


History of Any Multi-Drug Resistant Organisms: None Reported


Past Surgical History: Hysterectomy, Tonsillectomy


Additional Past Surgical History / Comment(s): SKIN CANCER REMOVALS, 

COLONOSCOPY.


Past Anesthesia/Blood Transfusion Reactions: No Reported Reaction


Past Psychological History: Bipolar


Smoking Status: Current every day smoker


Past Alcohol Use History: None Reported


Past Drug Use History: None Reported





- Past Family History


  ** Mother


Additional Family Medical History / Comment(s): MOTHER HAD A "BAD HEART."





  ** Father


Additional Family Medical History / Comment(s): FATHER  IN A PLANE CRASH 

EARLIER IN LIFE.





General Exam


Limitations: physical limitation


General appearance: alert


Head exam: Present: atraumatic


Eye exam: Present: normal appearance


ENT exam: Present: normal exam


Neck exam: Present: normal inspection


Respiratory exam: Absent: respiratory distress


Cardiovascular Exam: Present: regular rate, normal rhythm


GI/Abdominal exam: Present: soft.  Absent: tenderness


Extremities exam: Absent: tenderness


Back exam: Present: normal inspection


Neurological exam: Present: altered


Psychiatric exam: Absent: agitated


Skin exam: Present: warm, dry





Course





                                   Vital Signs











  11/15/22





  20:06


 


Temperature 97.7 F


 


Pulse Rate 44 L


 


Respiratory 18





Rate 


 


Blood Pressure 122/49


 


O2 Sat by Pulse 100





Oximetry 














Medical Decision Making





- Medical Decision Making





Patient presented after syncopal episode.  She has evidence of bladder 

infection.  I ordered IV antibiotics.  She will be admitted to the hospital.





- Lab Data


Result diagrams: 


                                 11/15/22 21:07





                                 11/15/22 21:07





                                   Lab Results











  11/15/22 11/15/22 11/15/22 Range/Units





  21:07 21:07 21:07 


 


WBC  14.9 H    (3.8-10.6)  k/uL


 


RBC  4.45    (3.80-5.40)  m/uL


 


Hgb  13.2    (11.4-16.0)  gm/dL


 


Hct  40.2    (34.0-46.0)  %


 


MCV  90.2    (80.0-100.0)  fL


 


MCH  29.7    (25.0-35.0)  pg


 


MCHC  32.9    (31.0-37.0)  g/dL


 


RDW  13.6    (11.5-15.5)  %


 


Plt Count  343    (150-450)  k/uL


 


MPV  9.5    


 


Neutrophils %  84    %


 


Lymphocytes %  10    %


 


Monocytes %  4    %


 


Eosinophils %  1    %


 


Basophils %  0    %


 


Neutrophils #  12.5 H    (1.3-7.7)  k/uL


 


Lymphocytes #  1.4    (1.0-4.8)  k/uL


 


Monocytes #  0.7    (0-1.0)  k/uL


 


Eosinophils #  0.1    (0-0.7)  k/uL


 


Basophils #  0.0    (0-0.2)  k/uL


 


PT   11.9   (9.0-12.0)  sec


 


INR   1.1   (<1.2)  


 


APTT   31.0 H   (22.0-30.0)  sec


 


Sodium     (137-145)  mmol/L


 


Potassium     (3.5-5.1)  mmol/L


 


Chloride     ()  mmol/L


 


Carbon Dioxide     (22-30)  mmol/L


 


Anion Gap     mmol/L


 


BUN     (7-17)  mg/dL


 


Creatinine     (0.52-1.04)  mg/dL


 


Est GFR (CKD-EPI)AfAm     (>60 ml/min/1.73 sqM)  


 


Est GFR (CKD-EPI)NonAf     (>60 ml/min/1.73 sqM)  


 


Glucose     (74-99)  mg/dL


 


Calcium     (8.4-10.2)  mg/dL


 


Total Bilirubin     (0.2-1.3)  mg/dL


 


AST     (14-36)  U/L


 


ALT     (4-34)  U/L


 


Alkaline Phosphatase     ()  U/L


 


Ammonia     (<30)  umol/L


 


Total Protein     (6.3-8.2)  g/dL


 


Albumin     (3.5-5.0)  g/dL


 


Urine Color     


 


Urine Appearance     (Clear)  


 


Urine pH     (5.0-8.0)  


 


Ur Specific Gravity     (1.001-1.035)  


 


Urine Protein     (Negative)  


 


Urine Glucose (UA)     (Negative)  


 


Urine Ketones     (Negative)  


 


Urine Blood     (Negative)  


 


Urine Nitrite     (Negative)  


 


Urine Bilirubin     (Negative)  


 


Urine Urobilinogen     (<2.0)  mg/dL


 


Ur Leukocyte Esterase     (Negative)  


 


Urine RBC     (0-5)  /hpf


 


Urine WBC     (0-5)  /hpf


 


Urine WBC Clumps     (None)  /hpf


 


Urine Bacteria     (None)  /hpf


 


Hyaline Casts     (0-2)  /lpf


 


Urine Mucus     (None)  /hpf


 


Urine Opiates Screen    Not Detected  (NotDetected)  


 


Ur Oxycodone Screen    Not Detected  (NotDetected)  


 


Urine Methadone Screen    Not Detected  (NotDetected)  


 


Ur Propoxyphene Screen    Not Detected  (NotDetected)  


 


Ur Barbiturates Screen    Not Detected  (NotDetected)  


 


U Tricyclic Antidepress    Not Detected  (NotDetected)  


 


Ur Phencyclidine Scrn    Not Detected  (NotDetected)  


 


Ur Amphetamines Screen    Not Detected  (NotDetected)  


 


U Methamphetamines Scrn    Not Detected  (NotDetected)  


 


U Benzodiazepines Scrn    Not Detected  (NotDetected)  


 


Urine Cocaine Screen    Not Detected  (NotDetected)  


 


U Marijuana (THC) Screen    Not Detected  (NotDetected)  














  11/15/22 11/15/22 11/15/22 Range/Units





  21:07 21:07 21:07 


 


WBC     (3.8-10.6)  k/uL


 


RBC     (3.80-5.40)  m/uL


 


Hgb     (11.4-16.0)  gm/dL


 


Hct     (34.0-46.0)  %


 


MCV     (80.0-100.0)  fL


 


MCH     (25.0-35.0)  pg


 


MCHC     (31.0-37.0)  g/dL


 


RDW     (11.5-15.5)  %


 


Plt Count     (150-450)  k/uL


 


MPV     


 


Neutrophils %     %


 


Lymphocytes %     %


 


Monocytes %     %


 


Eosinophils %     %


 


Basophils %     %


 


Neutrophils #     (1.3-7.7)  k/uL


 


Lymphocytes #     (1.0-4.8)  k/uL


 


Monocytes #     (0-1.0)  k/uL


 


Eosinophils #     (0-0.7)  k/uL


 


Basophils #     (0-0.2)  k/uL


 


PT     (9.0-12.0)  sec


 


INR     (<1.2)  


 


APTT     (22.0-30.0)  sec


 


Sodium  135 L    (137-145)  mmol/L


 


Potassium  5.3 H    (3.5-5.1)  mmol/L


 


Chloride  107    ()  mmol/L


 


Carbon Dioxide  25    (22-30)  mmol/L


 


Anion Gap  3    mmol/L


 


BUN  22 H    (7-17)  mg/dL


 


Creatinine  0.79    (0.52-1.04)  mg/dL


 


Est GFR (CKD-EPI)AfAm  83    (>60 ml/min/1.73 sqM)  


 


Est GFR (CKD-EPI)NonAf  72    (>60 ml/min/1.73 sqM)  


 


Glucose  173 H    (74-99)  mg/dL


 


Calcium  8.8    (8.4-10.2)  mg/dL


 


Total Bilirubin  1.4 H    (0.2-1.3)  mg/dL


 


AST  78 H    (14-36)  U/L


 


ALT  56 H    (4-34)  U/L


 


Alkaline Phosphatase  80    ()  U/L


 


Ammonia    <9  (<30)  umol/L


 


Total Protein  6.0 L    (6.3-8.2)  g/dL


 


Albumin  3.2 L    (3.5-5.0)  g/dL


 


Urine Color   Yellow   


 


Urine Appearance   Turbid H   (Clear)  


 


Urine pH   6.5   (5.0-8.0)  


 


Ur Specific Gravity   1.016   (1.001-1.035)  


 


Urine Protein   2+ H   (Negative)  


 


Urine Glucose (UA)   Negative   (Negative)  


 


Urine Ketones   Negative   (Negative)  


 


Urine Blood   Large H   (Negative)  


 


Urine Nitrite   Negative   (Negative)  


 


Urine Bilirubin   Negative   (Negative)  


 


Urine Urobilinogen   3.0   (<2.0)  mg/dL


 


Ur Leukocyte Esterase   Large H   (Negative)  


 


Urine RBC   158 H   (0-5)  /hpf


 


Urine WBC   >182 H   (0-5)  /hpf


 


Urine WBC Clumps   Many H   (None)  /hpf


 


Urine Bacteria   Few H   (None)  /hpf


 


Hyaline Casts   8 H   (0-2)  /lpf


 


Urine Mucus   Moderate H   (None)  /hpf


 


Urine Opiates Screen     (NotDetected)  


 


Ur Oxycodone Screen     (NotDetected)  


 


Urine Methadone Screen     (NotDetected)  


 


Ur Propoxyphene Screen     (NotDetected)  


 


Ur Barbiturates Screen     (NotDetected)  


 


U Tricyclic Antidepress     (NotDetected)  


 


Ur Phencyclidine Scrn     (NotDetected)  


 


Ur Amphetamines Screen     (NotDetected)  


 


U Methamphetamines Scrn     (NotDetected)  


 


U Benzodiazepines Scrn     (NotDetected)  


 


Urine Cocaine Screen     (NotDetected)  


 


U Marijuana (THC) Screen     (NotDetected)  














11/15/22 22:19


Twelve-lead EKG shows ventricular rate 79 bpm, normal MN interval, normal QRS 

complex, no ST elevation or depression, interpreted by me as normal sinus 

rhythm.





Disposition


Clinical Impression: 


 Syncope and collapse





Disposition: ADMITTED AS IP TO THIS HOSP


Condition: Fair


Referrals: 


Florence Sullivan MD [Primary Care Provider] - 1-2 days

## 2022-11-15 NOTE — CT
EXAMINATION TYPE: CT brain wo con

CT DLP: 1109.4 mGycm, Automated exposure control for dose reduction was used.

 

DATE OF EXAM: 11/15/2022 9:37 PM

 

COMPARISON: Prior CT Brain from 10/9/2021.

 

CLINICAL INDICATION:Female, 77 years old with history of Altered mental status, ams

 

TECHNIQUE: 

Brain: Axial CT images of the brain were obtained with coronal and sagittal reformats created and rev
iewed.

Contrast used: None.

Oral contrast used: None.

 

FINDINGS:

 

Brain:

Extra-axial spaces: No abnormal extra-axial fluid collections.

Ventricular system: Dilatation in proportion to cerebral atrophy.

Cerebral parenchyma: Bilateral lacunar injuries within the basal ganglia. These are unchanged from pr
ior. Cerebral atrophy. No acute intraparenchymal hemorrhage or mass effect.  The gray-white junction 
is well differentiated. Scattered hypoattenuating areas are seen within the white matter.  

Cerebellum: Unremarkable.

Mass effect: No evidence of midline shift.

Intracranial vasculature: unremarkable

Soft tissues: Normal.

Calvarium/osseous structures: No depressed skull fracture.

Paranasal sinuses and mastoid air cells: Mild scattered paranasal sinus disease.

Visualized orbits: Orbital contents are intact.

 

IMPRESSION:

1. No acute intracranial process.

2. Remote bilateral basal ganglia lacunar injuries along with nonspecific white matter changes likely
 secondary to chronic microangiopathy.

## 2022-11-15 NOTE — XR
EXAMINATION TYPE: XR chest 1V portable

 

DATE OF EXAM: 11/15/2022 9:04 PM

 

COMPARISON: Chest radiographs from 10/9/2021

 

TECHNIQUE: XR chest 1V portable Portable AP radiograph of the chest.

 

CLINICAL INDICATION:Female, 77 years old with history of altered mental status; 

 

FINDINGS: 

Lungs/Pleura: There is no evidence of pleural effusion, focal consolidation, or pneumothorax.  

Pulmonary vascularity: Unremarkable.

Heart/mediastinum: Cardiomediastinal silhouette is unremarkable. Similar dilation of the aortic arch.


Musculoskeletal: No acute osseous pathology.

 

IMPRESSION: 

1.  No acute cardiopulmonary disease/process.

2.  Similar dilation of the aortic arch appears to have increased from 2017. Consider CTA chest as cl
inically warranted for aortic aneurysm.

## 2022-11-16 RX ADMIN — APIXABAN SCH MG: 5 TABLET, FILM COATED ORAL at 08:15

## 2022-11-16 RX ADMIN — APIXABAN SCH MG: 5 TABLET, FILM COATED ORAL at 20:48

## 2022-11-16 RX ADMIN — CHOLECALCIFEROL TAB 125 MCG (5000 UNIT) SCH MCG: 125 TAB at 08:15

## 2022-11-16 NOTE — P.HPIM
History of Present Illness


H&P Date: 22





HISTORY OF PRESENT ILLNESS


 This is a 77-year-old female patient with past medical history of COPD, 

hypertension, hyperlipidemia, bipolar disorder, dementia, left lower extremity 

DVT on eliquis, clavicular fracture. Patient was hospitalized at [facility] on 10/9/2021 at which time she presented with increasing forgetfulness 

unable to take care of herself and unable to perform ADLs and gradual decline 

over the past few months. Patient was also having more frequent manic episodes 

and refusing to take her medications. She was treated for acute rhabdomyolysis. 

Patient was stabilized and discharged to [facility] initially for 

subacute rehab and subsequently transitioned to long-term care.  Patient had a 

recent fall sustaining a right clavicular fracture, and diagnosed with a left 

lower extremity DVT on  and started on Xarelto.  She has also had decline in

ADLs needing more assistance and encouragement at mealtimes and hospice was 

being entertained as an option for her care. Patient's weight has been an issue 

since admission and has been monitored very closely by dietitian.   Last 

evening, patient was sitting in her wheelchair at the table in the dining area, 

staff thought patient was asleep but when they checked on her they found a 

copious amount of drool coming out of her mouth and mucus coming from her nose 

and cyanosis.  Patient was initially unresponsive but eventually responded to 

sternal rub.  An AED was applied to the patient and no shock was advised.  Her 

vital signs were blood pressure 126/53, heart rate 72 and pulse ox 82% on room 

air.  Oxygen was applied at 3 L and patient was transferred to [facility] emergency center for further evaluation and concern for atrial 

fibrillation.





Patient presented to [facility] emergency center.  She was 

found to be afebrile, heart rate initially 44, blood pressure 122/49, pulse ox 

100% on 2 L nasal cannula.  EKG sinus rhythm


INR 1.1.  Sodium 135, potassium 5.3, chloride 107, CO2 25, BUN 22 and creatinine

0.79.  Glucose 173.  Calcium 8.8.  Total bilirubin 1.4, AST 78, ALT 56, alkaline

phosphatase 80.  Ammonia less than 9.  Troponin 0.0-7, 0.025, 0.033.  Albumin 

3.2.  Urinalysis turbid, leukoesterase large, RBCs 158, wbc's 182, WBC clumps 

many.  Urine drug screen negative.


CAT scan of the brain revealed no acute intracranial process.  Remote bilateral 

basal ganglia lacunar injuries along with nonspecific white matter changes 

likely secondary to chronic microangiopathic.


Chest x-ray reveals no acute cardiopulmonary disease or process.  Similar 

dilation of the aortic arch appears to have increased from 2017.  Consider CT of

the chest if warranted for aortic aneurysm.


Patient was provided 1 L of IV fluids, 1 dose of ceftriaxone, resumed on eliquis

and placed on the observation unit.


Patient has been seen by cardiology and echocardiogram and d-dimer were ordered.

 D-dimer came back elevated at 1.83 and CTA of the chest was ordered.








REVIEW OF SYSTEMS


 Constitutional: No fever, no chills, chronic weight loss. No new weakness, 

fatigue or lethargy. No daytime sleepiness.


 EENT: No headache. No blurred vision or double vision. No nasal drainage or 

congestion. No epistaxis. 


 Lungs: No shortness of breath, cough, no sputum production. No wheezing.


 Cardiovascular: No chest pain, no lower extremity edema.  Reported syncopal 

episode.


 Abdominal: No abdominal pain. No nausea, vomiting. No diarrhea. No 

constipation. No bloody or tarry stools.  Chronic loss of appetite.


 Genitourinary: No dysuria, increased frequency, urgency. No urinary retention.


 Musculoskeletal: No myalgias.  Chronic muscle weakness, chronic gait 

dysfunction, previous falls. No back pain. No neck pain.


 Integumentary: No wounds. No rash or pruritus.


 Neurologic: No aphasia. No facial droop.  Chronic confusion secondary to 

dementia. No head injury. No headache. No paralysis. No paresthesia.


 Endocrine: No abnormal blood sugars. No weight change. No excessive sweating or

thirst. No cold intolerance.


 





MEDICAL HISTORY


 COPD


 Hypertension


 Hyperlipidemia


 Dementia


 Bipolar disorder


 Nephrolithiasis Left lower extremity DVT








SURGICAL HISTORY


 Hysterectomy


 Tonsillectomy


 Skin cancer removal


 Colonoscopy








 SOCIAL HISTORY


 Patient is a smoker one pack per day starting at age 15 and quit 6 months ago. 

She denies any alcohol use, marijuana use or illicit drug use. She resides at 

[facility].  Patient has a public guardian.








FAMILY HISTORY


 Mother  in her 70s from coronary artery disease. Father  in his 50s 

from a plane accident. She does not have a brothers. Patient is one sister that 

is past with MS. Patient has one daughter with thyroid problems. Patient has one

son thatdied 6 years ago from a brain hemorrhage.








 PHYSICAL EXAMINATION


 Gen: This is a thin cachectic appearing 77-year-old  female.  Patient 

is resting in bed and appears to be comfortable and in no acute distress.


 HEENT: Head is atraumatic, normocephalic. Pupils equal, round. Sclerae is 

anicteric.


 NECK: Supple. No JVD. No lymphadenopathy. No thyromegaly.


 LUNGS: Few scattered rhonchi.No intercostal retractions. No accessory muscle 

usage.


 HEART: Regular rate and rhythm. No murmur.


 ABDOMEN: Soft. Bowel sounds are present. No masses. No tenderness.


 EXTREMITIES: No pedal edema. No calf tenderness.


 NEUROLOGICAL: Patient is awake,alert and oriented to person, chronic confusion 

secondary to underlying dementia.  Generalized weakness to upper and lower 

extremities.  








 ASSESSMENT AND PLAN


 1.  Syncopal episode, rule out arrhythmia, vasovagal.





2.  Metabolic encephalopathy secondary to UTI and sepsis.  Patient will be 

continued on ceftriaxone 1 g daily, urine culture is in progress.





3.  Elevated d-dimer.  CTA ordered to rule out pulmonary embolism.





4.  Mild elevation in liver function tests.  Acute hepatitis panel ordered.  





5.  DVT left lower extremity.  Patient continued on eliquis 5 mg twice daily.





6.  Generalized debility with worsening symptoms and difficulty performing her 

own ADLs, requiring more encouragement.  There has been consideration for 

hospice care at the nursing home.





7.  Hypertension.  Patient will be continued on lisinopril 10 mg daily.  





8.  Hyperlipidemia.  Patient is not currently on statin.





9.  COPD the stable.  No exacerbation.





10.  Remote history of tobacco use and dependence.  Patient quit smoking in 

.





11.  Bipolar disorder.  Continue Remeron 7.5 mg at bedtime, Risperdal 2 mg at 

bedtime





12.  Severe protein calorie malnutrition, chronic.  Patient resumed on regular 

diet, continue Remeron.





13.  GI prophylaxis.  Protonix.





14.  DVT prophylaxis.  Eliquis.








DISCHARGE PLAN


Return to [facility] 





Impression and plan of care have been directed as dictated by the signing grecia maloneyian.  Patti Escobar nurse practitioner acting as scribe for signing physician.





Past Medical History


Past Medical History: Cancer, COPD, Dementia, GERD/Reflux, Hyperlipidemia, 

Hypertension, Osteoarthritis (OA), Renal Disease, Syncope


Additional Past Medical History / Comment(s): SYNCOPAL EPISODES RECENTLY, 

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

WITH REMOVAL.


History of Any Multi-Drug Resistant Organisms: None Reported


Past Surgical History: Hysterectomy, Tonsillectomy


Additional Past Surgical History / Comment(s): SKIN CANCER REMOVALS, 

COLONOSCOPY.


Past Anesthesia/Blood Transfusion Reactions: No Reported Reaction


Past Psychological History: Bipolar


Smoking Status: Current every day smoker


Past Alcohol Use History: None Reported


Past Drug Use History: None Reported





- Past Family History


  ** Mother


Additional Family Medical History / Comment(s): MOTHER HAD A "BAD HEART."





  ** Father


Additional Family Medical History / Comment(s): FATHER  IN A PLANE CRASH 

EARLIER IN LIFE.





Medications and Allergies


                                Home Medications











 Medication  Instructions  Recorded  Confirmed  Type


 


risperiDONE [RisperDAL] 2 mg PO HS 21 History


 


Acetaminophen [Acetaminophen ER] 650 mg PO Q6H PRN 22 History


 


Cholecalciferol [Vitamin D3 (125 125 mcg PO DAILY 22 History





Mcg = 5000 Iu)]    


 


Magnesium Hydroxide [Milk of 2,400 mg PO DAILY PRN 22 History





Magnesia]    


 


Mirtazapine 7.5 mg PO HS 22 History


 


bisacodyL 10 mg RECTAL DAILY PRN 22 History


 


lisinopriL [Zestril] 10 mg PO DAILY 22 History


 


Acetaminophen Suppository [Tylenol 650 mg RECTAL Q6H PRN 22 

History





Suppository]    


 


Apixaban [Eliquis] 5 mg PO BID 22 History


 


Healthshake 1 dose PO DAILY 22 History


 


Lac-Hydrin 5% Lotion 1 applic TOPICAL Q8H PRN 22 History








                                    Allergies











Allergy/AdvReac Type Severity Reaction Status Date / Time


 


No Known Allergies Allergy   Verified 22 09:09














Physical Exam


Vitals: 


                                   Vital Signs











  Temp Pulse Pulse Resp BP BP Pulse Ox


 


 22 06:31  97.1 F L   55 L  16   105/70  98


 


 22 01:14  97.8 F   57 L  16   122/82  100


 


 22 00:51   58 L   16  106/55   97


 


 11/15/22 22:33   67   18  105/47   97


 


 11/15/22 20:06  97.7 F  44 L   18  122/49   100








                                Intake and Output











 11/15/22 11/16/22 11/16/22





 22:59 06:59 14:59


 


Other:   


 


  Voiding Method  Diaper 


 


  # Voids  1 


 


  Weight 68.039 kg  














Results


CBC & Chem 7: 


                                 11/15/22 21:07





                                 11/15/22 21:07


Labs: 


                  Abnormal Lab Results - Last 24 Hours (Table)











  11/15/22 11/15/22 11/15/22 Range/Units





  21:07 21:07 21:07 


 


WBC  14.9 H    (3.8-10.6)  k/uL


 


Neutrophils #  12.5 H    (1.3-7.7)  k/uL


 


APTT   31.0 H   (22.0-30.0)  sec


 


Sodium    135 L  (137-145)  mmol/L


 


Potassium    5.3 H  (3.5-5.1)  mmol/L


 


BUN    22 H  (7-17)  mg/dL


 


Glucose    173 H  (74-99)  mg/dL


 


Total Bilirubin    1.4 H  (0.2-1.3)  mg/dL


 


AST    78 H  (14-36)  U/L


 


ALT    56 H  (4-34)  U/L


 


Total Protein    6.0 L  (6.3-8.2)  g/dL


 


Albumin    3.2 L  (3.5-5.0)  g/dL


 


Urine Appearance     (Clear)  


 


Urine Protein     (Negative)  


 


Urine Blood     (Negative)  


 


Ur Leukocyte Esterase     (Negative)  


 


Urine RBC     (0-5)  /hpf


 


Urine WBC     (0-5)  /hpf


 


Urine WBC Clumps     (None)  /hpf


 


Urine Bacteria     (None)  /hpf


 


Hyaline Casts     (0-2)  /lpf


 


Urine Mucus     (None)  /hpf














  11/15/22 Range/Units





  21:07 


 


WBC   (3.8-10.6)  k/uL


 


Neutrophils #   (1.3-7.7)  k/uL


 


APTT   (22.0-30.0)  sec


 


Sodium   (137-145)  mmol/L


 


Potassium   (3.5-5.1)  mmol/L


 


BUN   (7-17)  mg/dL


 


Glucose   (74-99)  mg/dL


 


Total Bilirubin   (0.2-1.3)  mg/dL


 


AST   (14-36)  U/L


 


ALT   (4-34)  U/L


 


Total Protein   (6.3-8.2)  g/dL


 


Albumin   (3.5-5.0)  g/dL


 


Urine Appearance  Turbid H  (Clear)  


 


Urine Protein  2+ H  (Negative)  


 


Urine Blood  Large H  (Negative)  


 


Ur Leukocyte Esterase  Large H  (Negative)  


 


Urine RBC  158 H  (0-5)  /hpf


 


Urine WBC  >182 H  (0-5)  /hpf


 


Urine WBC Clumps  Many H  (None)  /hpf


 


Urine Bacteria  Few H  (None)  /hpf


 


Hyaline Casts  8 H  (0-2)  /lpf


 


Urine Mucus  Moderate H  (None)  /hpf








                      Microbiology - Last 24 Hours (Table)











 11/15/22 21:07 Urine Culture - Preliminary





 Urine,Voided

## 2022-11-16 NOTE — P.CRDCN
History of Present Illness


Consult date: 22


History of present illness: 





History of Present Illness:


The patient is a 77-year-old female was transferred to the hospital with change 

in mental status.  She is awake, follows verbal commands, appears to be 

confused.  She recently was diagnosed with a DVT and started on anticoagulation.

 Her ultrasound venous Doppler showed a left common femoral vein DVT.  The 

patient denies any chest discomfort, dyspnea or dizziness.  She denies any 

palpitations.  She has been in sinus mechanism since her admission.  Her 

troponin are normal and she has no acute ST segment changes.  There is no prior 

history of documented CAD or CHF according to the records available.  Her base

line level of physical activity is unclear.  She has a history of hypertension, 

she is a nondiabetic nonsmoker.


Medications: Zestril 10 mg daily, vitamin D, Risperdal, Eliquis 5 mg twice a day





Review of Systems:


Very limited but the patient denies any chest discomfort, dyspnea or dizziness. 

She denies any nausea or vomiting.





Physical Examination: 


77-year-old female, alert in no apparent distress, confused,Blood pressure 

105/70, Heart rate 55


Head: Normocephalic.


Eyes: Sclerae nonicteric.


Neck: Good carotid upstroke, no bruit, no jugular venous distention.


Lungs: Clear to auscultation.


Heart: Regular rate and rhythm, S1-S2, no S3, no rub. No murmur.


Abdomen: Soft nontender, positive bowel sounds no organomegaly.


Extremities: No edema, intact distal pulses, discomfort and swelling in the left

lower extremity.





Labs: 


Hemoglobin 13.2, potassium 5.3.  BUN 22, creatinine 0.79.  Total bilirubin 1.4. 

AST 78, ALT 56.  White blood cell 14.9.  Troponin 0.027, 0.025, 0.033.  Chest x-

ray shows no acute changes with dilatation of the aortic arch.  Computed 

tomography scan of the brain showed no acute pathology


EKG:


Sinus mechanism with right bundle branch block, noted in the past


Impression:


1.  Change in mental status, etiology unclear.  No evidence of cardiac etiology


2.  Recent DVT, rule out PE and hypoxemia


3.  Abnormal liver function test, etiology unclear, the bilirubin has been 

elevated in the past but the AST was normal on .


4.  History of hypertension





Plan:


1.  Obtain an echocardiogram with Doppler


2.  Continue anticoagulation


3.  Obtain d-dimer and if abnormal obtain a CT angiogram to rule out pulmonary 

embolism


4.  Follow her renal functions


5.  Workup of liver function test per primary care physician


Thank you for this consult we will follow with you





Past Medical History


Past Medical History: Cancer, COPD, Dementia, GERD/Reflux, Hyperlipidemia, 

Hypertension, Osteoarthritis (OA), Renal Disease, Syncope


Additional Past Medical History / Comment(s): SYNCOPAL EPISODES RECENTLY, 

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

WITH REMOVAL.


History of Any Multi-Drug Resistant Organisms: None Reported


Past Surgical History: Hysterectomy, Tonsillectomy


Additional Past Surgical History / Comment(s): SKIN CANCER REMOVALS, 

COLONOSCOPY.


Past Anesthesia/Blood Transfusion Reactions: No Reported Reaction


Past Psychological History: Bipolar


Smoking Status: Current every day smoker


Past Alcohol Use History: None Reported


Past Drug Use History: None Reported





- Past Family History


  ** Mother


Additional Family Medical History / Comment(s): MOTHER HAD A "BAD HEART."





  ** Father


Additional Family Medical History / Comment(s): FATHER  IN A PLANE CRASH 

EARLIER IN LIFE.





Medications and Allergies


                                Home Medications











 Medication  Instructions  Recorded  Confirmed  Type


 


risperiDONE [RisperDAL] 2 mg PO HS 21 History


 


Acetaminophen [Acetaminophen ER] 650 mg PO Q6H PRN 22 History


 


Cholecalciferol [Vitamin D3 (125 125 mcg PO DAILY 22 History





Mcg = 5000 Iu)]    


 


Magnesium Hydroxide [Milk of 2,400 mg PO DAILY PRN 22 History





Magnesia]    


 


Mirtazapine 7.5 mg PO HS 22 History


 


bisacodyL 10 mg RECTAL DAILY PRN 22 History


 


lisinopriL [Zestril] 10 mg PO DAILY 22 History


 


Acetaminophen Suppository [Tylenol 650 mg RECTAL Q6H PRN 22 

History





Suppository]    


 


Apixaban [Eliquis] 5 mg PO BID 22 History


 


Healthshake 1 dose PO DAILY 22 History


 


Lac-Hydrin 5% Lotion 1 applic TOPICAL Q8H PRN 22 History








                                    Allergies











Allergy/AdvReac Type Severity Reaction Status Date / Time


 


No Known Allergies Allergy   Verified 22 09:09














Physical Exam


Vitals: 


                                   Vital Signs











  Temp Pulse Pulse Resp BP BP Pulse Ox


 


 22 06:31  97.1 F L   55 L  16   105/70  98


 


 22 01:14  97.8 F   57 L  16   122/82  100


 


 22 00:51   58 L   16  106/55   97


 


 11/15/22 22:33   67   18  105/47   97


 


 11/15/22 20:06  97.7 F  44 L   18  122/49   100








                                Intake and Output











 11/15/22 11/16/22 11/16/22





 22:59 06:59 14:59


 


Other:   


 


  Voiding Method  Diaper 


 


  # Voids  1 


 


  Weight 68.039 kg  














Results





                                 11/15/22 21:07





                                 11/15/22 21:07


                                 Cardiac Enzymes











  11/15/22 11/15/22 11/16/22 Range/Units





  21:07 21:54 00:30 


 


AST  78 H    (14-36)  U/L


 


Troponin I   0.027  0.025  (0.000-0.034)  ng/mL














  22 Range/Units





  02:23 


 


AST   (14-36)  U/L


 


Troponin I  0.033  (0.000-0.034)  ng/mL








                                   Coagulation











  11/15/22 Range/Units





  21:07 


 


PT  11.9  (9.0-12.0)  sec


 


APTT  31.0 H  (22.0-30.0)  sec








                                       CBC











  11/15/22 Range/Units





  21:07 


 


WBC  14.9 H  (3.8-10.6)  k/uL


 


RBC  4.45  (3.80-5.40)  m/uL


 


Hgb  13.2  (11.4-16.0)  gm/dL


 


Hct  40.2  (34.0-46.0)  %


 


Plt Count  343  (150-450)  k/uL








                          Comprehensive Metabolic Panel











  11/15/22 Range/Units





  21:07 


 


Sodium  135 L  (137-145)  mmol/L


 


Potassium  5.3 H  (3.5-5.1)  mmol/L


 


Chloride  107  ()  mmol/L


 


Carbon Dioxide  25  (22-30)  mmol/L


 


BUN  22 H  (7-17)  mg/dL


 


Creatinine  0.79  (0.52-1.04)  mg/dL


 


Glucose  173 H  (74-99)  mg/dL


 


Calcium  8.8  (8.4-10.2)  mg/dL


 


AST  78 H  (14-36)  U/L


 


ALT  56 H  (4-34)  U/L


 


Alkaline Phosphatase  80  ()  U/L


 


Total Protein  6.0 L  (6.3-8.2)  g/dL


 


Albumin  3.2 L  (3.5-5.0)  g/dL








                               Current Medications











Generic Name Dose Route Start Last Admin





  Trade Name Freq  PRN Reason Stop Dose Admin


 


Acetaminophen  650 mg  11/15/22 22:21 





  Acetaminophen Tab 325 Mg Tab  PO  





  Q6H PRN  





  Fever and/ or Pain  


 


Apixaban  5 mg  11/15/22 22:30  22 08:15





  Apixaban 5 Mg Tab  PO   5 mg





  BID CHANTE   Administration


 


Bisacodyl  10 mg  11/15/22 22:21 





  Bisacodyl 10 Mg Supp  RECTAL  





  DAILY PRN  





  Constipation  


 


Cholecalciferol  125 mcg  22 09:00  22 08:15





  Cholecalciferol 125 Mcg (5000 Iu) Tablet  PO   125 mcg





  DAILY CHANTE   Administration


 


Ceftriaxone Sodium 1 gm/  50 mls @ 100 mls/hr  22 09:00  22 09:05





  Sodium Chloride  IVPB   100 mls/hr





  Q24HR CHANTE   Administration





  Protocol  


 


Lisinopril  10 mg  22 09:00  22 08:14





  Lisinopril 10 Mg Tab  PO   10 mg





  DAILY CHANTE   Administration


 


Magnesium Hydroxide  2,400 mg  11/15/22 22:21 





  Magnesium Hydroxide 2,400 Mg/10 Ml Cup  PO  





  DAILY PRN  





  Constipation  


 


Mirtazapine  7.5 mg  22 21:00 





  Mirtazapine 15 Mg Tab  PO  





  HS CHANTE  


 


Naloxone HCl  0.2 mg  11/15/22 22:20 





  Naloxone 0.4 Mg/Ml 1 Ml Vial  IV  





  Q2M PRN  





  Opioid Reversal  


 


Ondansetron HCl  4 mg  11/15/22 22:20 





  Ondansetron 4 Mg/2 Ml Vial  IVP  





  Q8HR PRN  





  Nausea And Vomiting  


 


Risperidone  2 mg  22 21:00 





  Risperidone 2 Mg Tab  PO  





  HS HCANTE  








                                Intake and Output











 11/15/22 11/16/22 11/16/22





 22:59 06:59 14:59


 


Other:   


 


  Voiding Method  Diaper 


 


  # Voids  1 


 


  Weight 68.039 kg  








                                        





                                 11/15/22 21:07 





                                 11/15/22 21:07

## 2022-11-16 NOTE — CT
EXAMINATION TYPE: CT angio chest

 

DATE OF EXAM: 11/16/2022

 

COMPARISON: Chest x-ray from yesterday

 

HISTORY: Syncope, UTI, elevated d-dimer

 

CT DLP: 273 mGycm. Automated Exposure Control for Dose Reduction was Utilized.

 

CONTRAST: 

CTA scan of the thorax is performed with IV Contrast, patient injected with 100, wasted 29 mL of Isov
ue 370, pulmonary embolism protocol.   MIP Images are created on CT scanner and reviewed.

 

FINDINGS:

 

LUNGS: Moderate underlying emphysematous change is redemonstrated. There are small to tiny left great
er than right pleural effusions. There is associated bibasilar compressive atelectasis. No pneumothor
ax seen bilaterally.

 

MEDIASTINUM: There is satisfactory enhancement of the pulmonary artery and its branches, there is no 
CT evidence for pulmonary embolism.  There are no greater than 1 cm hilar or mediastinal lymph nodes.
   No cardiomegaly or pericardial effusion is seen. Coronary artery cusp location is present which is
 noted marker for underlying coronary artery disease. Ascending aorta measures up to 3.6 cm in diamet
er with mild peripheral calcified plaque.

 

OTHER:  No additional significant abnormality is seen.

 

IMPRESSION: Moderate emphysematous change. There are small the tiny left greater than right pleural e
ffusions with associated bibasilar compressive atelectasis. No suspicious acute pulmonary process.

## 2022-11-16 NOTE — CA
Transthoracic Echo Report 

 Name: Lee Ann Bagley 

 MRN:    [id number] 

 Age:    77     Gender:     F 

 

 :    1945 

 Exam Date:     2022 10:37 

 Exam Location: [facility] 

 Ht (in):     65     Wt (lb):     150 

 Ordering Physician:        Vicki Berry MD  

                            ([id number]) 

 Attending/Referring Phys: 

 Technician         Gilda Parisi RDCS 

 Procedure CPT: 

 Indications:       HTN 

 

 Cardiac Hx: 

 Technical Quality:      Good 

 Contrast 1:    N/A                         Total Dose (mL): 

 Contrast 2:                                Total Dose (mL): 

 

 MEASUREMENTS  (Male / Female) Normal Values 

 2D ECHO 

 LV Diastolic Diameter PLAX        4.5 cm                4.2 - 5.9 / 3.9 - 5.3 cm 

 LV Systolic Diameter PLAX         3.4 cm                 

 IVS Diastolic Thickness           1.1 cm                0.6 - 1.0 / 0.6 - 0.9 cm 

 LVPW Diastolic Thickness          1.3 cm                0.6 - 1.0 / 0.6 - 0.9 cm 

 LV Relative Wall Thickness        0.5                    

 RV Internal Dim ED PLAX           2.7 cm                 

 LVOT Diameter                     2.1 cm                 

 LA Systolic Diameter LX           2.6 cm                3.0 - 4.0 / 2.7 - 3.8 cm 

 LV Diastolic Volume MOD BP        47.4 cm???              67 - 155 / 56 - 104 cm??? 

 LV Systolic Volume MOD BP         26.0 cm???              22 - 58 / 19 - 49 cm??? 

 LV Ejection Fraction MOD BP       45.2 %                >= 55  % 

 LV Diastolic Volume MOD 4C        59.2 cm???               

 LV Systolic Volume MOD 4C         18.4 cm???               

 LV Ejection Fraction MOD 4C       69.0 %                 

 LV Diastolic Length 4C            7.5 cm                 

 LV Systolic Length 4C             6.9 cm                 

 LV Diastolic Volume MOD 2C        34.3 cm???               

 LV Systolic Volume MOD 2C         32.4 cm???               

 LV Ejection Fraction MOD 2C       5.6 %                  

 LV Diastolic Length 2C            6.5 cm                 

 LV Systolic Length 2C             5.8 cm                 

 LA Volume                         47.4 cm???              18 - 58 / 22 - 52 cm??? 

 

 M-MODE 

 Aortic Root Diameter MM           3.2 cm                 

 LA Systolic Diameter MM           3.0 cm                 

 LA Ao Ratio MM                    1.0                    

 MV E Point Septal Separation      0.4 cm                 

 AV Cusp Separation MM             1.6 cm                 

 

 DOPPLER 

 AV Peak Velocity                  131.8 cm/s             

 AV Peak Gradient                  7.0 mmHg               

 AV Mean Velocity                  77.6 cm/s              

 AV Mean Gradient                  2.9 mmHg               

 AV Velocity Time Integral         27.2 cm                

 LVOT Peak Velocity                103.9 cm/s             

 LVOT Peak Gradient                4.3 mmHg               

 AV Area Cont Eq pk                2.8 cm???                

 MV Peak Velocity                  115.8 cm/s             

 MV Peak Gradient                  5.4 mmHg               

 MV Mean Velocity                  62.8 cm/s              

 MV Mean Gradient                  2.0 mmHg               

 MV Velocity Time Integral         32.1 cm                

 MV Area PHT                       2.0 cm???                

 Mitral E Point Velocity           43.2 cm/s              

 Mitral A Point Velocity           88.0 cm/s              

 Mitral E to A Ratio               0.5                    

 MV Deceleration Time              384.9 ms               

 MV E' Velocity                    4.2 cm/s               

 Mitral E to MV E' Ratio           10.2                   

 TR Peak Velocity                  162.9 cm/s             

 TR Peak Gradient                  10.6 mmHg              

 Right Ventricular Systolic Press  15.6 mmHg              

 PV Peak Velocity                  81.9 cm/s              

 PV Peak Gradient                  2.7 mmHg               

 PV Mean Velocity                  52.1 cm/s              

 PV Mean Gradient                  1.2 mmHg               

 PV Velocity Time Integral         16.3 cm                

 

 

 FINDINGS 

 Left Ventricle 

 Left ventricular ejection fraction is estimated at 55-60%. Mildly increased  

 left ventricular wall thickness.left ventricular cavity size normal. 

 

 Right Ventricle 

 Normal right ventricular size and function. 

 

 Right Atrium 

 Normal right atrial size. 

 

 Left Atrium 

 Left atrial dilatation. 

 

 Mitral Valve 

 Thickened mitral valve leaflets.  Mild mitral regurgitation. 

 

 Aortic Valve 

 Trileaflet aortic valve.  No evidence of stenosis or regurgitation 

 

 Tricuspid Valve 

 Structurally normal tricuspid valve. Mild tricuspid regurgitation. 

 

 Pulmonic Valve 

 Pulmonic valve not well visualized. 

 

 Pericardium 

 Normal pericardium. 

 

 Aorta 

 Normal size aortic root and proximal ascending aorta. 

 

 CONCLUSIONS 

 1.  Normal ventricle size and systolic function 

 2.  Normal right ventricle size with no evidence of pulmonary hypertension 

 3.  Mild mitral and tricuspid regurgitation 

 Previewed by:  

 Dr. Vicki Berry MD 

 (Electronically Signed) 

 Final Date:      2022 12:24

## 2022-11-17 VITALS — TEMPERATURE: 97.4 F | HEART RATE: 78 BPM | RESPIRATION RATE: 14 BRPM

## 2022-11-17 VITALS — SYSTOLIC BLOOD PRESSURE: 132 MMHG | DIASTOLIC BLOOD PRESSURE: 82 MMHG

## 2022-11-17 LAB
ALBUMIN SERPL-MCNC: 2.9 G/DL (ref 3.8–4.9)
ALBUMIN/GLOB SERPL: 1.32 G/DL (ref 1.6–3.17)
ALP SERPL-CCNC: 87 U/L (ref 41–126)
ALT SERPL-CCNC: 96 U/L (ref 8–44)
ANION GAP SERPL CALC-SCNC: 7.5 MMOL/L (ref 10–18)
AST SERPL-CCNC: 84 U/L (ref 13–35)
BUN SERPL-SCNC: 18.4 MG/DL (ref 9–27)
BUN/CREAT SERPL: 30.67 RATIO (ref 12–20)
CALCIUM SPEC-MCNC: 9 MG/DL (ref 8.7–10.3)
CHLORIDE SERPL-SCNC: 109 MMOL/L (ref 96–109)
CO2 SERPL-SCNC: 24.5 MMOL/L (ref 20–27.5)
GLOBULIN SER CALC-MCNC: 2.2 G/DL (ref 1.6–3.3)
GLUCOSE SERPL-MCNC: 94 MG/DL (ref 70–110)
POTASSIUM SERPL-SCNC: 4 MMOL/L (ref 3.5–5.5)
PROT SERPL-MCNC: 5.1 G/DL (ref 6.2–8.2)
SODIUM SERPL-SCNC: 141 MMOL/L (ref 135–145)

## 2022-11-17 RX ADMIN — CHOLECALCIFEROL TAB 125 MCG (5000 UNIT) SCH MCG: 125 TAB at 09:50

## 2022-11-17 RX ADMIN — APIXABAN SCH MG: 5 TABLET, FILM COATED ORAL at 09:50

## 2022-11-17 NOTE — P.DS
Providers


Date of admission: 


11/16/22 08:33





Expected date of discharge: 11/17/22


Attending physician: 


Florence Sullivan





Consults: 





                                        





11/16/22 09:12


Consult Physician Routine 


   Consulting Provider: Vicki Berry


   Consult Reason/Comments: syncope


   Do you want consulting provider notified?: Yes











Primary care physician: 


Florence Sullivan





[facility] Course: 





HISTORY OF PRESENT ILLNESS


 This is a 77-year-old female patient with past medical history of COPD, 

hypertension, hyperlipidemia, bipolar disorder, dementia, left lower extremity 

DVT on eliquis, clavicular fracture. Patient was hospitalized at [facility] on 10/9/2021 at which time she presented with increasing forgetfulness 

unable to take care of herself and unable to perform ADLs and gradual decline 

over the past few months. Patient was also having more frequent manic episodes 

and refusing to take her medications. She was treated for acute rhabdomyolysis. 

Patient was stabilized and discharged to [facility] initially for 

subacute rehab and subsequently transitioned to long-term care.  Patient had a 

recent fall sustaining a right clavicular fracture, and diagnosed with a left 

lower extremity DVT on 11/8 and started on Xarelto.  She has also had decline in

ADLs needing more assistance and encouragement at mealtimes and hospice was 

being entertained as an option for her care. Patient's weight has been an issue 

since admission and has been monitored very closely by dietitian.   Last 

evening, patient was sitting in her wheelchair at the table in the dining area, 

staff thought patient was asleep but when they checked on her they found a 

copious amount of drool coming out of her mouth and mucus coming from her nose 

and cyanosis.  Patient was initially unresponsive but eventually responded to 

sternal rub.  An AED was applied to the patient and no shock was advised.  Her 

vital signs were blood pressure 126/53, heart rate 72 and pulse ox 82% on room 

air.  Oxygen was applied at 3 L and patient was transferred to [facility] emergency center for further evaluation and concern for atrial 

fibrillation.





Patient presented to [facility] emergency center.  She was 

found to be afebrile, heart rate initially 44, blood pressure 122/49, pulse ox 

100% on 2 L nasal cannula.  EKG sinus rhythm


INR 1.1.  Sodium 135, potassium 5.3, chloride 107, CO2 25, BUN 22 and creatinine

0.79.  Glucose 173.  Calcium 8.8.  Total bilirubin 1.4, AST 78, ALT 56, alkaline

phosphatase 80.  Ammonia less than 9.  Troponin 0.0-7, 0.025, 0.033.  Albumin 

3.2.  Urinalysis turbid, leukoesterase large, RBCs 158, wbc's 182, WBC clumps 

many.  Urine drug screen negative.


CAT scan of the brain revealed no acute intracranial process.  Remote bilateral 

basal ganglia lacunar injuries along with nonspecific white matter changes 

likely secondary to chronic microangiopathic.


Chest x-ray reveals no acute cardiopulmonary disease or process.  Similar 

dilation of the aortic arch appears to have increased from 2017.  Consider CT of

the chest if warranted for aortic aneurysm.


Patient was provided 1 L of IV fluids, 1 dose of ceftriaxone, resumed on eliquis

and placed on the observation unit.


Patient has been seen by cardiology and echocardiogram and d-dimer were ordered.

 D-dimer came back elevated at 1.83 and CTA of the chest was ordered.





11/17: Patient has been seen and followed by cardiology and cardiac arrhythmia 

has been ruled out.  Cardiology has recommended continue current therapy and has

signed off the case and following on an as-needed basis.  Patient remains 

afebrile, heart rate 78, blood pressure 135/65, pulse ox 100% on room air.


URINE CULTURE IS IN PROCESS.  Acute hepatitis panel negative.  Repeat blood work

reveals sodium 141, potassium 4.0, chloride 109, CO2 24, BUN 18, creatinine 0.6.

 Glucose 94, calcium 9.0, total bilirubin 0.4, AST 84, ALT 97.


CT angiogram of the chest was negative for pulmonary embolism.  Moderate 

emphysematous change.  Small tiny left greater than right pleural effusions with

associated bibasilar compressive atelectasis.


Echocardiogram reveals normal ventricular size and systolic function.  Normal 

right ventricle size with no evidence of pulmonary hypertension.  Mild mitral 

and tricuspid regurgitation.














DISCHARGE DIAGNOSES


1.  Syncopal episode of unclear etiology.


2.  Metabolic encephalopathy secondary to UTI and sepsis.  


3.  Elevated d-dimer. Ruled out pulmonary embolism.


4.  Mild elevation in liver function tests.  


5.  DVT left lower extremity.  


6.  Generalized debility with worsening symptoms and difficulty performing her 

own ADLs, requiring more encouragement.  


7.  Hypertension.  


8.  Hyperlipidemia. 


9.  COPD the stable. 


10.  Remote history of tobacco use and dependence.  


11.  Bipolar disorder.  


12.  Severe protein calorie malnutrition, chronic. 








DISCHARGE PLAN


Return to [facility]


Greater than 35 minutes was utilized and coordinating patient's discharge.


Impression and plan of care have been directed as dictated by the signing 

physician.  Patti Escobar nurse practitioner acting as scribe for signing 

physician.


Patient Condition at Discharge: Good





Plan - Discharge Summary


New Discharge Prescriptions: 


New


   cefUROXime axetiL [Ceftin] 500 mg PO BID 7 Days #14 tab





Continue


   Magnesium Hydroxide [Milk of Magnesia] 2,400 mg PO DAILY PRN


     PRN Reason: Constipation


   bisacodyL 10 mg RECTAL DAILY PRN


     PRN Reason: Constipation


   Acetaminophen [Acetaminophen ER] 650 mg PO Q6H PRN


     PRN Reason: Fever And/ Or Pain


   lisinopriL [Zestril] 10 mg PO DAILY


   Mirtazapine 7.5 mg PO HS


   Apixaban [Eliquis] 5 mg PO BID


   Healthshake 1 dose PO DAILY


   Lac-Hydrin 5% Lotion 1 applic TOPICAL Q8H PRN


     PRN Reason: dry skin lower legs


   risperiDONE [RisperDAL] 2 mg PO HS


   Cholecalciferol [Vitamin D3 (125 Mcg = 5000 Iu)] 125 mcg PO DAILY





Discontinued


   Acetaminophen Suppository [Tylenol Suppository] 650 mg RECTAL Q6H PRN


     PRN Reason: Fever


Discharge Medication List





risperiDONE [RisperDAL] 2 mg PO HS 05/27/21 [History]


Acetaminophen [Acetaminophen ER] 650 mg PO Q6H PRN 11/01/22 [History]


Cholecalciferol [Vitamin D3 (125 Mcg = 5000 Iu)] 125 mcg PO DAILY 11/01/22 

[History]


Magnesium Hydroxide [Milk of Magnesia] 2,400 mg PO DAILY PRN 11/01/22 [History]


Mirtazapine 7.5 mg PO HS 11/01/22 [History]


bisacodyL 10 mg RECTAL DAILY PRN 11/01/22 [History]


lisinopriL [Zestril] 10 mg PO DAILY 11/01/22 [History]


Apixaban [Eliquis] 5 mg PO BID 11/16/22 [History]


Healthshake 1 dose PO DAILY 11/16/22 [History]


Lac-Hydrin 5% Lotion 1 applic TOPICAL Q8H PRN 11/16/22 [History]


cefUROXime axetiL [Ceftin] 500 mg PO BID 7 Days #14 tab 11/17/22 [Rx]








Follow up Appointment(s)/Referral(s): 


Florence Sullivan MD [Primary Care Provider] - 1 Week (at [facility])


Discharge Disposition: TRANSFER TO SNF/ECF

## 2022-11-17 NOTE — CDI
Documentation Clarification Form



Date: 11/17/2022 01:47:27 PM

From: Radha Jj RN, CCDS

Phone: [phone]

MRN: [id number]

Admit Date: 11/16/2022 08:33:00 AM

Patient Name: Lee Ann Bagley

Visit Number: [id number]

Discharge Date:  





ATTENTION: The Clinical Documentation Specialists (CDI) and [facility] Coding Staff 
appreciate your assistance in clarifying documentation. Please respond to the 
clarification below the line at the bottom and electronically sign. The CDI & 
[facility] Coding staff will review the response and follow-up if needed. Please note: 
Queries are made part of the Legal Health Record. If you have any questions, 
please contact the author of this message via ITS.



Dr. Florence Sullivan







Sepsis is documented in the H/P and subsequent progress note, which may lack 
sufficient clinical evidence/support in the medical record. Additional 
clarification is requested.



H/P: Review of systems: Constitutional: no fever, no chills, chronic weight 
loss. No new weakness, fatigue or lethargy. 



History/Risk Factors: COPD, Hypertension, Bipolar disorder, Dementia, 



Clinical Indicators: 77-yiiear-old female present from [facility] with syncopal. She 
was found to be afebrile, temperature 97.4,   heart rate initially 44, blood 
pressure 122/49, pulse 0x 100% on 3/L NC. 

11/15 Labs: WBC 14.9, Neutrophils 12.5, bun 22, cr 0.79, AST 78, ALT 56 UA: 
TURBID, Ur Leukocyte Esterase -Large,  Bacteria Few:  UA culture -Pending





Treatment:

Rocephin 2 GM IVPB X1 STA

Rocephin 1 GM IVPB Q 24 hrs.

.9NS 1,000 @999



Please clarify if Sepsis is a valid diagnosis?



[ X ] Yes, Sepsis] is present as evidence by (additional clinical support): 
____________



[  ] No, Sepsis is ruled out



[  ] Other (please specify diagnosis) ______________________



[  ]  Unable to determine



(Template Last Revised: March 2021)

___________________________________________________________________________

SIRS Criteria: 2 or more of the following may indicate SIRS   

-Temperature < 96.8F (36C) or > 101.0F (38.3C)

-Heart Rate > 90 bpm

-Respiratory Rate > 20 breaths/min or PaCO2 < 32 mmHg

-White Blood Cell Count > 12,000 or < 4,000 cells/mm3 or > 10% band

MTDD

## 2022-11-17 NOTE — P.PN
Subjective


Progress Note Date: 11/17/22





History of Present Illness:


The patient is a 77-year-old female who was admitted with syncopal episodes ac

cording to the nursing home notes her primary care physician.  She is more awake

and alert this morning, denies any chest discomfort, dizziness or palpitations. 

She is in sinus mechanism.  Her echocardiogram showed a normal systolic 

function.  Her left lower extremity discomfort is better.  She had an elevated 

d-dimer but her CT angiogram of the chest showed no evidence of pulmonary 

embolism.  


Medications: Zestril 10 mg daily, Risperdal, Eliquis





Review of Systems:


Respiratory: She has mild chronic dyspnea


GI: No nausea or vomiting . No history of peptic ulcer disease. No recent GI 

bleed.


: No hematuria or dysuria.


Nervous System: No stroke or seizure.





Physical Examination: 


77-year-old female alert and oriented no apparent distress,Blood pressure 

135/60, Heart rate 70


Head: Normocephalic.


Eyes: Sclerae nonicteric.


Neck: Good carotid upstroke, no bruit, no jugular venous distention.


Lungs: Clear to auscultation.


Heart: Regular rate and rhythm, S1-S2, no S3, no rub. No murmur.


Abdomen: Soft nontender, positive bowel sounds no organomegaly.


Extremities: No edema, left calf nontender but larger than right.  intact distal

pulses.








Impression:


1.  Syncope, no evidence of acute cardiac arrhythmia


2.  Recent left DVT with no evidence of pulmonary embolism, anticoagulated


3.  History of hypertension


4.  History of COPD





Plan:


1.  No evidence of cardiac etiology to her syncope


2.  Continue present therapy


3.  No further cardiac workup is needed at this time


4.  We will see her on an as needed basis


5.  Please feel free to call us for any question





Objective





- Vital Signs


Vital signs: 


                                   Vital Signs











Temp  97.4 F L  11/17/22 07:20


 


Pulse  78   11/17/22 07:20


 


Resp  14   11/17/22 07:20


 


BP  135/65   11/17/22 07:20


 


Pulse Ox  99   11/17/22 07:45


 


FiO2      








                                 Intake & Output











 11/16/22 11/17/22 11/17/22





 18:59 06:59 18:59


 


Intake Total 90 100 


 


Balance 90 100 


 


Intake:   


 


  Oral 90 100 


 


Other:   


 


  Voiding Method  Diaper 


 


  # Voids 1  


 


  # Bowel Movements 0  














- Labs


CBC & Chem 7: 


                                 11/15/22 21:07





                                 11/15/22 21:07


Labs: 


                  Abnormal Lab Results - Last 24 Hours (Table)











  11/16/22 Range/Units





  10:07 


 


D-Dimer  1.83 H  (<0.60)  mg/L FEU








                      Microbiology - Last 24 Hours (Table)











 11/15/22 21:07 Urine Culture - Preliminary





 Urine,Voided

## 2022-12-28 ENCOUNTER — HOSPITAL ENCOUNTER (EMERGENCY)
Dept: HOSPITAL 47 - EC | Age: 77
Discharge: HOME | End: 2022-12-28
Payer: MEDICARE

## 2022-12-28 VITALS — DIASTOLIC BLOOD PRESSURE: 68 MMHG | HEART RATE: 80 BPM | SYSTOLIC BLOOD PRESSURE: 119 MMHG

## 2022-12-28 VITALS — TEMPERATURE: 97.8 F

## 2022-12-28 VITALS — RESPIRATION RATE: 16 BRPM

## 2022-12-28 DIAGNOSIS — Z79.1: ICD-10-CM

## 2022-12-28 DIAGNOSIS — F17.200: ICD-10-CM

## 2022-12-28 DIAGNOSIS — J44.9: ICD-10-CM

## 2022-12-28 DIAGNOSIS — S00.83XA: Primary | ICD-10-CM

## 2022-12-28 DIAGNOSIS — Z79.899: ICD-10-CM

## 2022-12-28 DIAGNOSIS — I10: ICD-10-CM

## 2022-12-28 DIAGNOSIS — F31.9: ICD-10-CM

## 2022-12-28 DIAGNOSIS — M19.90: ICD-10-CM
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
## 2022-12-28 PROCEDURE — 99284 EMERGENCY DEPT VISIT MOD MDM: CPT

## 2022-12-28 PROCEDURE — 70450 CT HEAD/BRAIN W/O DYE: CPT

## 2022-12-28 PROCEDURE — 72125 CT NECK SPINE W/O DYE: CPT

## 2022-12-28 NOTE — ED
General Adult HPI





- General


Chief complaint: Fall


Stated complaint: slip & fall - head injury


Time Seen by Provider: 22 18:42


Source: patient, EMS, RN notes reviewed, old records reviewed


Mode of arrival: EMS


Limitations: altered mental status





- History of Present Illness


Initial comments: 





This is a 77-year-old female who presents emergency department stating that she 

fell out of bed.  Patient states she bumped the left side of her forehead just 

above and lateral to her left eye.  Patient states she did not lose 

consciousness she was not dazed patient does not complain of a headache 

currently.  Patient any neck pain.  Patient denies any numbness weakness.  

Patient is on eliquis however.  Patient denies any chest pain palpitations 

difficulty breathing first breath per patient denies any abdominal pain patient 

denies nausea vomiting diarrhea.  Patient denies any other injury.  Patient 

denies any extremity pain patient denies any back pain.





- Related Data


                                Home Medications











 Medication  Instructions  Recorded  Confirmed


 


risperiDONE [RisperDAL] 2 mg PO HS 21


 


Acetaminophen [Acetaminophen ER] 650 mg PO Q6H PRN 22


 


Cholecalciferol [Vitamin D3 (125 125 mcg PO DAILY 22





Mcg = 5000 Iu)]   


 


Magnesium Hydroxide [Milk of 2,400 mg PO DAILY PRN 22





Magnesia]   


 


Mirtazapine 7.5 mg PO HS 22


 


bisacodyL 10 mg RECTAL DAILY PRN 22


 


lisinopriL [Zestril] 10 mg PO DAILY 22


 


Apixaban [Eliquis] 5 mg PO BID 22


 


Healthshake 1 dose PO DAILY 22


 


Lac-Hydrin 5% Lotion 1 applic TOPICAL Q8H PRN 22








                                  Previous Rx's











 Medication  Instructions  Recorded


 


cefUROXime axetiL [Ceftin] 500 mg PO BID 7 Days #14 tab 22











                                    Allergies











Allergy/AdvReac Type Severity Reaction Status Date / Time


 


No Known Allergies Allergy   Verified 22 18:44














Review of Systems


ROS Statement: 


Those systems with pertinent positive or pertinent negative responses have been 

documented in the HPI.





ROS Other: All systems not noted in ROS Statement are negative.





Past Medical History


Past Medical History: Cancer, COPD, Dementia, GERD/Reflux, Hyperlipidemia, 

Hypertension, Osteoarthritis (OA), Renal Disease, Syncope


Additional Past Medical History / Comment(s): SYNCOPAL EPISODES RECENTLY, ES

SENTIAL TREMORS, djd, NEPHROLITHIASIS-PT PASSED STONE ON HER WON, SKIN CANCERS 

WITH REMOVAL.


History of Any Multi-Drug Resistant Organisms: None Reported


Past Surgical History: Hysterectomy, Tonsillectomy


Additional Past Surgical History / Comment(s): SKIN CANCER REMOVALS, 

COLONOSCOPY.


Past Anesthesia/Blood Transfusion Reactions: No Reported Reaction


Past Psychological History: Bipolar


Smoking Status: Current every day smoker


Past Alcohol Use History: None Reported


Past Drug Use History: None Reported





- Past Family History


  ** Mother


Additional Family Medical History / Comment(s): MOTHER HAD A "BAD HEART."





  ** Father


Additional Family Medical History / Comment(s): FATHER  IN A PLANE CRASH 

EARLIER IN LIFE.





General Exam





- General Exam Comments


Initial Comments: 





GENERAL:


Patient is well-developed and well-nourished.  Patient is nontoxic and well-

hydrated and is in no acute distress.





ENT:


Neck is soft and supple.  No significant lymphadenopathy is noted.  Oropharynx 

is clear.  Moist mucous membranes.  Neck has full range of motion without 

eliciting any pain.  Patient has a very slight contusion over the superior 

lateral aspect of the left eye





EYES:


The sclera were anicteric and conjunctiva were pink and moist.  Extraocular 

movements were intact and pupils were equal round and reactive to light.  

Eyelids were unremarkable.





PULMONARY:


Unlabored respirations.  Good breath sounds bilaterally.  No audible rales 

rhonchi or wheezing was noted.





CARDIOVASCULAR:


There is a regular rate and rhythm without any murmurs gallops or rubs.





ABDOMEN:


Soft and nontender with normal bowel sounds.  





SKIN:


Skin is clear with no lesions or rashes and otherwise unremarkable.





NEUROLOGIC:


Patient is alert and oriented x3.  Cranial nerves II through XII are grossly 

intact.  Motor and sensory are also intact.  Normal speech, volume and content. 

 Symmetrical smile. 





MUSCULOSKELETAL:


Normal extremities with adequate strength and full range of motion.





LYMPHATICS:


No significant lymphadenopathy is noted





PSYCHIATRIC:


Normal psychiatric evaluation. 


Limitations: altered mental status





Course


                                   Vital Signs











  22





  18:41


 


Temperature 97.8 F


 


Pulse Rate 72


 


Respiratory 18





Rate 


 


Blood Pressure 140/76


 


O2 Sat by Pulse 97





Oximetry 














Medical Decision Making





- Medical Decision Making





Was pt. sent in by a medical professional or institution?


@  -None


Did you speak to anyone other than the patient for history?  


@  -EMS


Did you review nursing and triage notes? 


@  -Agree with nursing notes


Were old charts reviewed? 


@  -None


Differential Diagnosis? 


@  -Forehead contusion, hematoma, intracranial hemorrhage, skull fracture


EKG interpreted by me (3pts min.)?


@  -None


X-rays interpreted by me (1pt min.)?


@  -None


CT interpreted by me (1pt min.)?


@  -CT of the brain and C-spine were interpreted by myself I saw no acute 

abnormalities in the brain or C-spine


U/S interpreted by me (1pt. min.)?


@  -None


What testing was considered but not performed? (CT, X-rays, U/S, labs)? Why?


@  -None


What meds were considered but not given? Why?


@  -None


Did you discuss the management of the patient with other professionals?


@  -None


Did you reconcile home meds?


@  -None


Was smoking cessation discussed for >3mins.?


@  -None


Was critical care preformed (if so, how long)?


@  -None


Were there social determinants of health that impacted care today? How? 

(Homelessness, low income, unemployed, alcoholism, drug addiction, 

transportation, low edu. Level, literacy, decrease access to med. care, long-term, 

rehab)?


@  -None


Was there de-escalation of care discussed even if they declined? (Discuss DNR or

 withdrawal of care, Hospice)?


@  -No


What co-morbidities impacted this encounter? (DM, HTN, Smoking, COPD, CAD, 

Cancer, CVA, Hep., AIDS, mental health diagnosis, sleep apnea, morbid obesity)?


@  -None


Was patient admitted / discharged?


@  -Discharge.  Patient is CT of the brain and C-spine showed no acute 

abnormality.  Patient had no other complaints patient is at her baseline and she

 will be sent back to the nursing home


Undiagnosed new problem with uncertain prognosis?


@  -No


Drug Therapy requiring intensive monitoring for toxicity (Heparin, Nitro, Ins

ulin, Cardizem)?


@  -No


Were any procedures done?


@  -No


Diagnosis/symptom?


@  -Contusion forehead


Acute, or Chronic, or Acute on Chronic?


@  -Acute


Uncomplicated (without systemic symptoms) or Complicated (systemic symptoms)?


@  -


Side effects of treatment?


@  -None


Exacerbation, Progression, or Severe Exacerbation]


@  -None


Poses a threat to life or bodily function?


@  -No





Disposition


Clinical Impression: 


 Fall, Forehead contusion





Disposition: HOME SELF-CARE


Instructions (If sedation given, give patient instructions):  Fall Prevention 

(ED), Contusion in Adults (ED)


Is patient prescribed a controlled substance at d/c from ED?: No


Referrals: 


Florence Sullivan MD [Primary Care Provider] - 1-2 days


Time of Disposition: 20:18

## 2022-12-28 NOTE — CT
EXAMINATION TYPE: CT brain cspine wo con

CT DLP: 1556.9 mGycm, Automated exposure control for dose reduction was used.

 

DATE OF EXAM: 12/28/2022 7:31 PM

 

COMPARISON: 11/15/2022

 

CLINICAL INDICATION:Female, 77 years old with history of Trauma; PAIN AFTER FALL

 

TECHNIQUE: 

Brain: Multiple axial CT images of the brain were obtained without IV contrast. 

Cspine: Axial CT images from the skull base to the inferior aspect of T2 we obtained without intraven
ous contrast. Coronal and sagittal reformatted images were also reviewed. 

 

FINDINGS:

 

Brain:

Extra-axial spaces: No abnormal extra-axial fluid collections.

Ventricular system: Within normal limits

Cerebral parenchyma: Bilateral basal ganglia hypodense areas. No acute intraparenchymal hemorrhage or
 mass effect.  The gray-white junction is well differentiated. 

Cerebellum: Unremarkable.

Mass effect: No evidence of midline shift.

Intracranial vasculature: Atherosclerotic calcifications of the intracranial vessels.

Soft tissues: Normal.

Calvarium/osseous structures: No depressed skull fracture.

Paranasal sinuses and mastoid air cells: Mild scattered mucosal thickening and or secretions.

Visualized orbits: Orbital contents are intact.

 

Cervical spine:

Fracture: None.

Osseous structures: Multilevel degenerative disc disease changes with endplate spurring and disc oste
ophyte complex's. 

Vertebral alignment: Within normal limits.

Spinal canal/Neural Foramina: No evidence of significant spinal canal narrowing. No evidence for sign
ificant neural foraminal stenosis.

Neck soft tissues: Prevertebral soft tissues are within normal limits.

Other: The airway is patent. Moderate centrilobular emphysema changes in the lung apices. Atheroscler
osis of the arterial vasculature including the carotid bifurcations.

 

IMPRESSION:

1.  No acute intracranial process.

2.  Remote lacunar injuries along with nonspecific white matter changes likely secondary to chronic m
icroangiopathy.

3.  No evidence of cervical spine fracture.

4.  Mild multilevel degenerative disc disease.

## 2023-04-17 ENCOUNTER — HOSPITAL ENCOUNTER (EMERGENCY)
Dept: HOSPITAL 47 - EC | Age: 78
Discharge: HOME | End: 2023-04-17
Payer: MEDICARE

## 2023-04-17 VITALS — DIASTOLIC BLOOD PRESSURE: 67 MMHG | SYSTOLIC BLOOD PRESSURE: 106 MMHG | HEART RATE: 60 BPM

## 2023-04-17 VITALS — TEMPERATURE: 98 F

## 2023-04-17 VITALS — RESPIRATION RATE: 18 BRPM

## 2023-04-17 DIAGNOSIS — R62.7: Primary | ICD-10-CM

## 2023-04-17 DIAGNOSIS — F17.200: ICD-10-CM

## 2023-04-17 DIAGNOSIS — I10: ICD-10-CM

## 2023-04-17 DIAGNOSIS — J44.9: ICD-10-CM

## 2023-04-17 DIAGNOSIS — M19.90: ICD-10-CM

## 2023-04-17 DIAGNOSIS — Z79.899: ICD-10-CM

## 2023-04-17 DIAGNOSIS — F31.9: ICD-10-CM

## 2023-04-17 DIAGNOSIS — Z79.1: ICD-10-CM

## 2023-04-17 DIAGNOSIS — Z20.822: ICD-10-CM

## 2023-04-17 LAB
ALBUMIN SERPL-MCNC: 3.2 G/DL (ref 3.5–5)
ALP SERPL-CCNC: 84 U/L (ref 38–126)
ALT SERPL-CCNC: 46 U/L (ref 4–34)
ANION GAP SERPL CALC-SCNC: 3 MMOL/L
AST SERPL-CCNC: 46 U/L (ref 14–36)
BASOPHILS # BLD AUTO: 0 K/UL (ref 0–0.2)
BASOPHILS NFR BLD AUTO: 1 %
BUN SERPL-SCNC: 31 MG/DL (ref 7–17)
CALCIUM SPEC-MCNC: 8.7 MG/DL (ref 8.4–10.2)
CHLORIDE SERPL-SCNC: 103 MMOL/L (ref 98–107)
CO2 SERPL-SCNC: 29 MMOL/L (ref 22–30)
EOSINOPHIL # BLD AUTO: 0 K/UL (ref 0–0.7)
EOSINOPHIL NFR BLD AUTO: 1 %
ERYTHROCYTE [DISTWIDTH] IN BLOOD BY AUTOMATED COUNT: 4.91 M/UL (ref 3.8–5.4)
ERYTHROCYTE [DISTWIDTH] IN BLOOD: 14.8 % (ref 11.5–15.5)
GLUCOSE SERPL-MCNC: 137 MG/DL (ref 74–99)
HCT VFR BLD AUTO: 41.9 % (ref 34–46)
HGB BLD-MCNC: 14 GM/DL (ref 11.4–16)
LYMPHOCYTES # SPEC AUTO: 1.5 K/UL (ref 1–4.8)
LYMPHOCYTES NFR SPEC AUTO: 29 %
MCH RBC QN AUTO: 28.5 PG (ref 25–35)
MCHC RBC AUTO-ENTMCNC: 33.4 G/DL (ref 31–37)
MCV RBC AUTO: 85.4 FL (ref 80–100)
MONOCYTES # BLD AUTO: 0.3 K/UL (ref 0–1)
MONOCYTES NFR BLD AUTO: 7 %
NEUTROPHILS # BLD AUTO: 3.1 K/UL (ref 1.3–7.7)
NEUTROPHILS NFR BLD AUTO: 61 %
PH UR: 8.5 [PH] (ref 5–8)
PLATELET # BLD AUTO: 246 K/UL (ref 150–450)
POTASSIUM SERPL-SCNC: 4.5 MMOL/L (ref 3.5–5.1)
PROT SERPL-MCNC: 6.2 G/DL (ref 6.3–8.2)
SODIUM SERPL-SCNC: 135 MMOL/L (ref 137–145)
SP GR UR: 1.02 (ref 1–1.03)
UROBILINOGEN UR QL STRIP: 3 MG/DL (ref ?–2)
WBC # BLD AUTO: 5.1 K/UL (ref 3.8–10.6)

## 2023-04-17 PROCEDURE — 71046 X-RAY EXAM CHEST 2 VIEWS: CPT

## 2023-04-17 PROCEDURE — 81003 URINALYSIS AUTO W/O SCOPE: CPT

## 2023-04-17 PROCEDURE — 87040 BLOOD CULTURE FOR BACTERIA: CPT

## 2023-04-17 PROCEDURE — 85025 COMPLETE CBC W/AUTO DIFF WBC: CPT

## 2023-04-17 PROCEDURE — 83605 ASSAY OF LACTIC ACID: CPT

## 2023-04-17 PROCEDURE — 84484 ASSAY OF TROPONIN QUANT: CPT

## 2023-04-17 PROCEDURE — 93005 ELECTROCARDIOGRAM TRACING: CPT

## 2023-04-17 PROCEDURE — 87636 SARSCOV2 & INF A&B AMP PRB: CPT

## 2023-04-17 PROCEDURE — 36415 COLL VENOUS BLD VENIPUNCTURE: CPT

## 2023-04-17 PROCEDURE — 80053 COMPREHEN METABOLIC PANEL: CPT

## 2023-04-17 PROCEDURE — 99285 EMERGENCY DEPT VISIT HI MDM: CPT

## 2023-04-17 NOTE — XR
EXAMINATION TYPE: XR chest 2V

 

DATE OF EXAM: 4/17/2023

 

COMPARISON: 2/26/2023, 11/16/2022

 

HISTORY: Weakness

 

TECHNIQUE:  Frontal and lateral views of the chest are obtained.

 

FINDINGS:  

 

The heart size is normal. The cardiomediastinal silhouette is relatively stable, with persistent prom
inence in the right perihilar region, which likely relates to prominence of the ascending aorta. Ther
e is no focal consolidation, significant pleural effusion, or pneumothorax.

 

IMPRESSION:  No acute cardiopulmonary process.

## 2023-04-17 NOTE — ED
General Adult HPI





- General


Chief complaint: Weakness


Stated complaint: Failure to thrive


Time Seen by Provider: 23 14:08


Source: patient, EMS, RN notes reviewed


Mode of arrival: EMS


Limitations: no limitations





- History of Present Illness


Initial comments: 





77-year-old  female presents to the emergency department via EMS from 

[facility] with a chief complaint of failure to thrive.  History is limited due 

to patient being a and O 1 which is baseline.  Part of the history was obtained

by EMS and her primary nurse at skilled nursing facility she reports that over 

the weekend the patient has had a decrease in appetite.  She denies any recent 

falls or trauma.  She denies any recent fever, cough, nausea, vomiting, 

diarrhea.





- Related Data


                                Home Medications











 Medication  Instructions  Recorded  Confirmed


 


Acetaminophen [Acetaminophen ER] 650 mg PO Q6H PRN 22


 


Magnesium Hydroxide [Milk of 2,400 mg PO Q72H PRN 22





Magnesia]   


 


bisacodyL 10 mg RECTAL DAILY PRN 22


 


Apixaban [Eliquis] 5 mg PO BID 22


 


Cholecalciferol [Vitamin D3 (25 25 mcg PO DAILY@1600 22





Mcg = 1000 Iu)]   


 


Acetaminophen [Tylenol] 325 mg PO Q6H PRN 23


 


Healthshake 1 can PO TID-W/MEALS 23


 


Losartan-Hctz 50-12.5 mg [Hyzaar 1 tab PO DAILY 23





50-12.5]   


 


Mirtazapine [Remeron] 30 mg PO HS 23


 


Prostat  30 ml PO DAILY 23











                                    Allergies











Allergy/AdvReac Type Severity Reaction Status Date / Time


 


No Known Allergies Allergy   Verified 23 15:19














Review of Systems


ROS Statement: 


Those systems with pertinent positive or pertinent negative responses have been 

documented in the HPI.





ROS Other: All systems not noted in ROS Statement are negative.





Past Medical History


Past Medical History: Cancer, COPD, Dementia, GERD/Reflux, Hyperlipidemia, 

Hypertension, Osteoarthritis (OA), Renal Disease, Syncope


Additional Past Medical History / Comment(s): SYNCOPAL EPISODES RECENTLY, 

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

 WITH REMOVAL.


History of Any Multi-Drug Resistant Organisms: None Reported


Past Surgical History: Hysterectomy, Tonsillectomy


Additional Past Surgical History / Comment(s): SKIN CANCER REMOVALS, 

COLONOSCOPY.


Past Anesthesia/Blood Transfusion Reactions: No Reported Reaction


Past Psychological History: Bipolar


Smoking Status: Current every day smoker


Past Alcohol Use History: None Reported


Past Drug Use History: None Reported





- Past Family History


  ** Mother


Additional Family Medical History / Comment(s): MOTHER HAD A "BAD HEART."





  ** Father


Additional Family Medical History / Comment(s): FATHER  IN A PLANE CRASH 

EARLIER IN LIFE.





General Exam


Limitations: no limitations


General appearance: alert, in no apparent distress


Head exam: Present: atraumatic, normocephalic, normal inspection


Eye exam: Present: normal appearance, PERRL, EOMI.  Absent: scleral icterus, 

conjunctival injection, periorbital swelling


ENT exam: Present: normal exam, mucous membranes moist


Neck exam: Present: normal inspection.  Absent: tenderness, meningismus, 

lymphadenopathy


Respiratory exam: Present: normal lung sounds bilaterally.  Absent: respiratory 

distress, wheezes, rales, rhonchi, stridor


Cardiovascular Exam: Present: regular rate, normal rhythm, normal heart sounds. 

 Absent: systolic murmur, diastolic murmur, rubs, gallop, clicks


GI/Abdominal exam: Present: soft, normal bowel sounds.  Absent: distended, 

tenderness, guarding, rebound, rigid


Extremities exam: Present: normal inspection, full ROM, normal capillary refill.

  Absent: tenderness, pedal edema, joint swelling, calf tenderness


Back exam: Present: normal inspection


Neurological exam: Present: alert, oriented X3, CN II-XII intact


Psychiatric exam: Present: normal affect, normal mood


Skin exam: Present: warm, dry, intact, normal color.  Absent: rash





Course


                                   Vital Signs











  23





  13:58 15:00 16:00


 


Temperature 98.9 F  98.0 F


 


Pulse Rate 78 68 65


 


Respiratory 18 18 18





Rate   


 


Blood Pressure 93/67 104/66 100/59


 


O2 Sat by Pulse 97 98 97





Oximetry   














  23





  17:15


 


Temperature 


 


Pulse Rate 60


 


Respiratory 18





Rate 


 


Blood Pressure 106/67


 


O2 Sat by Pulse 98





Oximetry 














EKG Findings





- EKG Comments:


EKG Findings:: I interpreted the following EKG performed at 14:33.  Rate 77 bpm 

normal sinus rhythm with occasional premature ventricular complexes.  WI 

interval 182, QRS duration 121, QT//418





Medical Decision Making





- Medical Decision Making





Was pt. sent in by a medical professional or institution ([facility] Fischer, NP, urgent 

care, hospital, or nursing home...) When possible be specific


@  -[No]


Did you speak to anyone other than the patient for history (EMS, parent, family,

 police, friend...)? What history was obtained from this source 


@  -[No]


Did you review nursing and triage notes (agree or disagree)?  Why? 


@  -[I reviewed and agree with nursing and triage notes]


Were old charts reviewed (outside hosp., previous admission, EMS record, old 

EKG, old radiological studies, urgent care reports/EKG's, nursing home records)?

Report findings 


@  -[No old charts were reviewed]


Differential Diagnosis (chest pain, altered mental status, abdominal pain women,

abdominal pain men, vaginal bleeding, weakness, fever, dyspnea, syncope, 

headache, dizziness, GI bleed, back pain, seizure, CVA, palpatations, mental 

health, musculoskeletal)? 


@  -[not applicable]


EKG interpreted by me (3pts min.).


@  -[As above]


X-rays interpreted by me (1pt min.).


@  -chest XR negative for acute intra-pleural process 


CT interpreted by me (1pt min.).


@  -[None done]


U/S interpreted by me (1pt. min.).


@  -[None done]


What testing was considered but not performed or refused? (CT, X-rays, U/S, 

labs)? Why?


@  -[None]


What meds were considered but not given or refused? Why?


@  -[None]


Did you discuss the management of the patient with other professionals 

(professionals i.e. VIVI Fischer, NP, lab, RT, psych nurse, , , 

teacher, , )? Give summary


@  -[No]


Was smoking cessation discussed for >3mins.?


@  -[No]


Was critical care preformed (if so, how long)?


@  -[No]


Were there social determinants of health that impacted care today? How? 

(Homelessness, low income, unemployed, alcoholism, drug addiction, 

transportation, low edu. Level, literacy, decrease access to med. care, alf, 

rehab)?


@  -[No]


Was there de-escalation of care discussed even if they declined (Discuss DNR or 

withdrawal of care, Hospice)? DNR status


@  -[No]


What co-morbidities impacted this encounter? (DM, HTN, Smoking, COPD, CAD, 

Cancer, CVA, ARF, Chemo, Hep., AIDS, mental health diagnosis, sleep apnea, 

morbid obesity)?


@  -[None]


Was patient admitted / discharged? Hospital course, mention meds given and 

route, prescriptions, significant lab abnormalities, going to OR and other perti

nent info.


@  Discharged.  This is a 77-year-old  female who presents to the 

emergency department for failure to thrive.  Patient had a thorough history and 

physical exam performed while in the ED.  Physical exam essentially unremarkable

 heart rate regular rate and rhythm, lungs clear to all physician bilaterally 

abdomen is soft and nontender. Patient had labwork and imaging performed which 

were essentially unremarkable.  I discussed the natural history of hemorrhoids 

with the patient verbalized understanding and all questions were addressed.  

Return precautions were discussed at length.  She was discharged in stable 

condition.  He was encouraged to follow-up with PCP in 1-2 days


Undiagnosed new problem with uncertain prognosis?


@  -[No]


Drug Therapy requiring intensive monitoring for toxicity (Heparin, Nitro, 

Insulin, Cardizem)?


@  -[No]


Were any procedures done?


@  -[No]


Diagnosis/symptom?


@  -failure to thrive 


Acute, or Chronic, or Acute on Chronic?


@  -acute 


Uncomplicated (without systemic symptoms) or Complicated (systemic symptoms)?


@  -uncomplicated 


Side effects of treatment?


@  -[No]


Exacerbation, Progression, or Severe Exacerbation?


@  -[No]


Poses a threat to life or bodily function? How? (Chest pain, USA, MI, pneumonia,

 PE, COPD, DKA, ARF, appy, cholecystitis, CVA, Diverticulitis, Homicidal, 

Suicidal, threat to staff... and all critical care pts)


@  -low likelihood 





- Lab Data


Result diagrams: 


                                 23 15:11





                                 23 15:11


                                   Lab Results











  23 Range/Units





  15:11 15:11 15:11 


 


WBC  5.1    (3.8-10.6)  k/uL


 


RBC  4.91    (3.80-5.40)  m/uL


 


Hgb  14.0    (11.4-16.0)  gm/dL


 


Hct  41.9    (34.0-46.0)  %


 


MCV  85.4    (80.0-100.0)  fL


 


MCH  28.5    (25.0-35.0)  pg


 


MCHC  33.4    (31.0-37.0)  g/dL


 


RDW  14.8    (11.5-15.5)  %


 


Plt Count  246    (150-450)  k/uL


 


MPV  8.3    


 


Neutrophils %  61    %


 


Lymphocytes %  29    %


 


Monocytes %  7    %


 


Eosinophils %  1    %


 


Basophils %  1    %


 


Neutrophils #  3.1    (1.3-7.7)  k/uL


 


Lymphocytes #  1.5    (1.0-4.8)  k/uL


 


Monocytes #  0.3    (0-1.0)  k/uL


 


Eosinophils #  0.0    (0-0.7)  k/uL


 


Basophils #  0.0    (0-0.2)  k/uL


 


Sodium    135 L  (137-145)  mmol/L


 


Potassium    4.5  (3.5-5.1)  mmol/L


 


Chloride    103  ()  mmol/L


 


Carbon Dioxide    29  (22-30)  mmol/L


 


Anion Gap    3  mmol/L


 


BUN    31 H  (7-17)  mg/dL


 


Creatinine    0.67  (0.52-1.04)  mg/dL


 


Est GFR (CKD-EPI)AfAm    >90  (>60 ml/min/1.73 sqM)  


 


Est GFR (CKD-EPI)NonAf    84  (>60 ml/min/1.73 sqM)  


 


Glucose    137 H  (74-99)  mg/dL


 


Plasma Lactic Acid Christian     (0.7-2.0)  mmol/L


 


Calcium    8.7  (8.4-10.2)  mg/dL


 


Total Bilirubin    1.2  (0.2-1.3)  mg/dL


 


AST    46 H  (14-36)  U/L


 


ALT    46 H  (4-34)  U/L


 


Alkaline Phosphatase    84  ()  U/L


 


Troponin I     (0.000-0.034)  ng/mL


 


Total Protein    6.2 L  (6.3-8.2)  g/dL


 


Albumin    3.2 L  (3.5-5.0)  g/dL


 


Urine Color   Yellow   


 


Urine Appearance   Clear   (Clear)  


 


Urine pH   8.5 H   (5.0-8.0)  


 


Ur Specific Gravity   1.019   (1.001-1.035)  


 


Urine Protein   Trace H   (Negative)  


 


Urine Glucose (UA)   Negative   (Negative)  


 


Urine Ketones   Negative   (Negative)  


 


Urine Blood   Negative   (Negative)  


 


Urine Nitrite   Negative   (Negative)  


 


Urine Bilirubin   Negative   (Negative)  


 


Urine Urobilinogen   3.0   (<2.0)  mg/dL


 


Ur Leukocyte Esterase   Negative   (Negative)  


 


Influenza Type A (PCR)     (Not Detectd)  


 


Influenza Type B (PCR)     (Not Detectd)  


 


RSV (PCR)     (Not Detectd)  


 


SARS-CoV-2 (PCR)     (Not Detectd)  














  23 Range/Units





  15:11 15:11 17:43 


 


WBC     (3.8-10.6)  k/uL


 


RBC     (3.80-5.40)  m/uL


 


Hgb     (11.4-16.0)  gm/dL


 


Hct     (34.0-46.0)  %


 


MCV     (80.0-100.0)  fL


 


MCH     (25.0-35.0)  pg


 


MCHC     (31.0-37.0)  g/dL


 


RDW     (11.5-15.5)  %


 


Plt Count     (150-450)  k/uL


 


MPV     


 


Neutrophils %     %


 


Lymphocytes %     %


 


Monocytes %     %


 


Eosinophils %     %


 


Basophils %     %


 


Neutrophils #     (1.3-7.7)  k/uL


 


Lymphocytes #     (1.0-4.8)  k/uL


 


Monocytes #     (0-1.0)  k/uL


 


Eosinophils #     (0-0.7)  k/uL


 


Basophils #     (0-0.2)  k/uL


 


Sodium     (137-145)  mmol/L


 


Potassium     (3.5-5.1)  mmol/L


 


Chloride     ()  mmol/L


 


Carbon Dioxide     (22-30)  mmol/L


 


Anion Gap     mmol/L


 


BUN     (7-17)  mg/dL


 


Creatinine     (0.52-1.04)  mg/dL


 


Est GFR (CKD-EPI)AfAm     (>60 ml/min/1.73 sqM)  


 


Est GFR (CKD-EPI)NonAf     (>60 ml/min/1.73 sqM)  


 


Glucose     (74-99)  mg/dL


 


Plasma Lactic Acid Christian  1.7    (0.7-2.0)  mmol/L


 


Calcium     (8.4-10.2)  mg/dL


 


Total Bilirubin     (0.2-1.3)  mg/dL


 


AST     (14-36)  U/L


 


ALT     (4-34)  U/L


 


Alkaline Phosphatase     ()  U/L


 


Troponin I   0.033   (0.000-0.034)  ng/mL


 


Total Protein     (6.3-8.2)  g/dL


 


Albumin     (3.5-5.0)  g/dL


 


Urine Color     


 


Urine Appearance     (Clear)  


 


Urine pH     (5.0-8.0)  


 


Ur Specific Gravity     (1.001-1.035)  


 


Urine Protein     (Negative)  


 


Urine Glucose (UA)     (Negative)  


 


Urine Ketones     (Negative)  


 


Urine Blood     (Negative)  


 


Urine Nitrite     (Negative)  


 


Urine Bilirubin     (Negative)  


 


Urine Urobilinogen     (<2.0)  mg/dL


 


Ur Leukocyte Esterase     (Negative)  


 


Influenza Type A (PCR)    Not Detected  (Not Detectd)  


 


Influenza Type B (PCR)    Not Detected  (Not Detectd)  


 


RSV (PCR)    Not Detected  (Not Detectd)  


 


SARS-CoV-2 (PCR)    Not Detected  (Not Detectd)  














Disposition


Clinical Impression: 


 Failure to thrive





Disposition: HOME SELF-CARE


Instructions (If sedation given, give patient instructions):  Failure to Thrive 

in Older Adults (ED)


Additional Instructions: 


Please return to the nearest emergency department if symptoms worsen or persist


Is patient prescribed a controlled substance at d/c from ED?: No


Referrals: 


Florence Sullivan MD [Primary Care Provider] - 1-2 days


Time of Disposition: 18:50

## 2023-05-16 ENCOUNTER — HOSPITAL ENCOUNTER (INPATIENT)
Dept: HOSPITAL 47 - EC | Age: 78
LOS: 7 days | Discharge: NURSING, REHAB OR LONG-TERM CARE | DRG: 871 | End: 2023-05-23
Attending: INTERNAL MEDICINE | Admitting: INTERNAL MEDICINE
Payer: MEDICARE

## 2023-05-16 VITALS — BODY MASS INDEX: 25 KG/M2

## 2023-05-16 DIAGNOSIS — Z85.828: ICD-10-CM

## 2023-05-16 DIAGNOSIS — Z86.718: ICD-10-CM

## 2023-05-16 DIAGNOSIS — L03.211: ICD-10-CM

## 2023-05-16 DIAGNOSIS — J44.9: ICD-10-CM

## 2023-05-16 DIAGNOSIS — I10: ICD-10-CM

## 2023-05-16 DIAGNOSIS — E43: ICD-10-CM

## 2023-05-16 DIAGNOSIS — E78.5: ICD-10-CM

## 2023-05-16 DIAGNOSIS — G92.8: ICD-10-CM

## 2023-05-16 DIAGNOSIS — K11.21: ICD-10-CM

## 2023-05-16 DIAGNOSIS — G25.0: ICD-10-CM

## 2023-05-16 DIAGNOSIS — F03.90: ICD-10-CM

## 2023-05-16 DIAGNOSIS — R65.20: ICD-10-CM

## 2023-05-16 DIAGNOSIS — F17.200: ICD-10-CM

## 2023-05-16 DIAGNOSIS — A41.9: Primary | ICD-10-CM

## 2023-05-16 DIAGNOSIS — K21.9: ICD-10-CM

## 2023-05-16 DIAGNOSIS — F31.9: ICD-10-CM

## 2023-05-16 DIAGNOSIS — Z79.01: ICD-10-CM

## 2023-05-16 DIAGNOSIS — E86.0: ICD-10-CM

## 2023-05-16 LAB
ALBUMIN SERPL-MCNC: 3.3 G/DL (ref 3.5–5)
ALP SERPL-CCNC: 122 U/L (ref 38–126)
ALT SERPL-CCNC: 53 U/L (ref 4–34)
ANION GAP SERPL CALC-SCNC: 10 MMOL/L
APTT BLD: 32.5 SEC (ref 22–30)
AST SERPL-CCNC: 76 U/L (ref 14–36)
BASOPHILS # BLD AUTO: 0.1 K/UL (ref 0–0.2)
BASOPHILS NFR BLD AUTO: 0 %
BUN SERPL-SCNC: 38 MG/DL (ref 7–17)
CALCIUM SPEC-MCNC: 9.2 MG/DL (ref 8.4–10.2)
CHLORIDE SERPL-SCNC: 100 MMOL/L (ref 98–107)
CO2 SERPL-SCNC: 27 MMOL/L (ref 22–30)
EOSINOPHIL # BLD AUTO: 0.3 K/UL (ref 0–0.7)
EOSINOPHIL NFR BLD AUTO: 1 %
ERYTHROCYTE [DISTWIDTH] IN BLOOD BY AUTOMATED COUNT: 4.95 M/UL (ref 3.8–5.4)
ERYTHROCYTE [DISTWIDTH] IN BLOOD: 15.1 % (ref 11.5–15.5)
GLUCOSE SERPL-MCNC: 129 MG/DL (ref 74–99)
HCT VFR BLD AUTO: 42.2 % (ref 34–46)
HGB BLD-MCNC: 13.6 GM/DL (ref 11.4–16)
INR PPP: 1.1 (ref ?–1.2)
LYMPHOCYTES # SPEC AUTO: 2 K/UL (ref 1–4.8)
LYMPHOCYTES NFR SPEC AUTO: 8 %
MAGNESIUM SPEC-SCNC: 2.2 MG/DL (ref 1.6–2.3)
MCH RBC QN AUTO: 27.6 PG (ref 25–35)
MCHC RBC AUTO-ENTMCNC: 32.3 G/DL (ref 31–37)
MCV RBC AUTO: 85.3 FL (ref 80–100)
MONOCYTES # BLD AUTO: 0.8 K/UL (ref 0–1)
MONOCYTES NFR BLD AUTO: 3 %
NEUTROPHILS # BLD AUTO: 21.5 K/UL (ref 1.3–7.7)
NEUTROPHILS NFR BLD AUTO: 86 %
PLATELET # BLD AUTO: 304 K/UL (ref 150–450)
POTASSIUM SERPL-SCNC: 3.8 MMOL/L (ref 3.5–5.1)
PROT SERPL-MCNC: 6.4 G/DL (ref 6.3–8.2)
PT BLD: 11.7 SEC (ref 9–12)
SODIUM SERPL-SCNC: 137 MMOL/L (ref 137–145)
WBC # BLD AUTO: 24.9 K/UL (ref 3.8–10.6)

## 2023-05-16 PROCEDURE — 85027 COMPLETE CBC AUTOMATED: CPT

## 2023-05-16 PROCEDURE — 36410 VNPNXR 3YR/> PHY/QHP DX/THER: CPT

## 2023-05-16 PROCEDURE — 71045 X-RAY EXAM CHEST 1 VIEW: CPT

## 2023-05-16 PROCEDURE — 83735 ASSAY OF MAGNESIUM: CPT

## 2023-05-16 PROCEDURE — 85610 PROTHROMBIN TIME: CPT

## 2023-05-16 PROCEDURE — 99285 EMERGENCY DEPT VISIT HI MDM: CPT

## 2023-05-16 PROCEDURE — 36415 COLL VENOUS BLD VENIPUNCTURE: CPT

## 2023-05-16 PROCEDURE — 85025 COMPLETE CBC W/AUTO DIFF WBC: CPT

## 2023-05-16 PROCEDURE — 96365 THER/PROPH/DIAG IV INF INIT: CPT

## 2023-05-16 PROCEDURE — 87040 BLOOD CULTURE FOR BACTERIA: CPT

## 2023-05-16 PROCEDURE — 93005 ELECTROCARDIOGRAM TRACING: CPT

## 2023-05-16 PROCEDURE — 76937 US GUIDE VASCULAR ACCESS: CPT

## 2023-05-16 PROCEDURE — 83605 ASSAY OF LACTIC ACID: CPT

## 2023-05-16 PROCEDURE — 80053 COMPREHEN METABOLIC PANEL: CPT

## 2023-05-16 PROCEDURE — 80048 BASIC METABOLIC PNL TOTAL CA: CPT

## 2023-05-16 PROCEDURE — 85730 THROMBOPLASTIN TIME PARTIAL: CPT

## 2023-05-16 PROCEDURE — 70491 CT SOFT TISSUE NECK W/DYE: CPT

## 2023-05-16 RX ADMIN — APIXABAN SCH MG: 5 TABLET, FILM COATED ORAL at 22:13

## 2023-05-16 RX ADMIN — CEFAZOLIN SCH MLS/HR: 330 INJECTION, POWDER, FOR SOLUTION INTRAMUSCULAR; INTRAVENOUS at 16:04

## 2023-05-16 NOTE — CT
EXAMINATION TYPE: CT soft tissue neck w con

 

DATE OF EXAM: 5/16/2023

 

COMPARISON: None

 

HISTORY: cellulitis to left side of neck

 

CT DLP: 179.5 mGycm

 

CONTRAST: Patient injected with 100 mL of Isovue 300.

 

TECHNIQUE: Axial images at 3 mm thick sections.  Reconstructed images in the coronal plane and sagitt
al plane are reviewed.

 

FINDINGS: Limited CT sections are obtained the lung apices.  The lung apices appear clear. 

 

CT neck: The torus tubarius and fossa of Rosenmuller are normal.   spaces are normal.  Para
nasal sinuses and mastoid air cells are clear. 

 

Right parotid gland appears normal. Right submandibular gland appears normal. Left parotid gland and 
submandibular glands are prominent. There is diffuse increased density within the subcutaneous tissue
s on the left. This is deep to the platysmas as well extending to the thickened submandibular gland. 
Findings can be compatible with infection and cellulitis. Underlying abscess is not identified.

 

Parapharyngeal spaces are normal.  No suspicious adenopathy is evident. 

 

The hypopharynx appears within normal limits.

Subglottic airway appears normal. Vocal cord level appears asymmetrical with medial displacement of t
he left vocal cord in relation to the right. There is some fullness of the left hypopharynx in relati
on to the right. Consider direct visualization.

 

There is a hypodensity within left lobe thyroid. Calcification may be within the left lobe thyroid. T
here is a hypodensity within the right lobe thyroid.  

 

Osseous structures are normal.

 

IMPRESSIONS:

1. Increased signal through the left neck subcutaneous and deeper extending to adjacent to the subman
dibular gland and parotid gland. Findings could be compatible with infection. Underlying abscess driscoll
vinnie is not identified at this time.

## 2023-05-16 NOTE — ED
General Adult HPI





- General


Chief complaint: Skin/Abscess/Foreign Body


Stated complaint: Neck swelling


Time Seen by Provider: 23 12:25


Source: EMS


Mode of arrival: EMS





- History of Present Illness


Initial comments: 


Dictation was produced using dragon dictation software. please excuse any 

grammatical, word or spelling errors. 











Chief Complaint: 77-year-old female with chronic debility, mental debility, 

bipolar disease presents to emergency Department with left-sided neck swelling





History of Present Illness: Is a 77-year-old female she is unable to provide 

history present illness.  She is a resident admitted [facility].  Rest 

of present illness obtained from transfer documentation and nurse's receive 

report from EMS.  According to the sources patient had noticeable left neck 

swelling's noticed today.  Patient unable to provide history of present illness 

secondary to baseline mental status.  No other history was available.








Unable to obtain review of systems secondary to mental status











- Related Data


                                Home Medications











 Medication  Instructions  Recorded  Confirmed


 


Acetaminophen [Acetaminophen ER] 650 mg PO Q6H PRN 22


 


Magnesium Hydroxide [Milk of 2,400 mg PO Q72H PRN 22





Magnesia]   


 


bisacodyL 10 mg RECTAL DAILY PRN 22


 


Apixaban [Eliquis] 5 mg PO BID 22


 


Cholecalciferol [Vitamin D3 (25 25 mcg PO DAILY@1600 22





Mcg = 1000 Iu)]   


 


Acetaminophen [Tylenol] 650 mg PO Q6H PRN 23


 


Healthshake 1 can PO TID-W/MEALS 23


 


Losartan-Hctz 50-12.5 mg [Hyzaar 1 tab PO DAILY 23





50-12.5]   


 


Prostat  30 ml PO DAILY@0800 23


 


Mirtazapine [Remeron Soluspan] 30 mg PO HS 23


 


Sennosides [Senokot] 8.6 mg PO DAILY 23











                                    Allergies











Allergy/AdvReac Type Severity Reaction Status Date / Time


 


No Known Allergies Allergy   Verified 23 14:09














Review of Systems


ROS Statement: 


Those systems with pertinent positive or pertinent negative responses have been 

documented in the HPI.





ROS Other: All systems not noted in ROS Statement are negative.





Past Medical History


Past Medical History: Cancer, COPD, Dementia, GERD/Reflux, Hyperlipidemia, 

Hypertension, Osteoarthritis (OA), Renal Disease, Syncope


Additional Past Medical History / Comment(s): SYNCOPAL EPISODES RECENTLY, 

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

 WITH REMOVAL.


History of Any Multi-Drug Resistant Organisms: None Reported


Past Surgical History: Hysterectomy, Tonsillectomy


Additional Past Surgical History / Comment(s): SKIN CANCER REMOVALS, 

COLONOSCOPY.


Past Anesthesia/Blood Transfusion Reactions: No Reported Reaction


Past Psychological History: Bipolar


Smoking Status: Current every day smoker


Past Alcohol Use History: None Reported


Past Drug Use History: None Reported





- Past Family History


  ** Mother


Additional Family Medical History / Comment(s): MOTHER HAD A "BAD HEART."





  ** Father


Additional Family Medical History / Comment(s): FATHER  IN A PLANE CRASH 

EARLIER IN LIFE.





General Exam





- General Exam Comments


Initial Comments: 








PHYSICAL EXAM:


General Impression: Alert and oriented x3, not in acute distress


HEENT: Normocephalic atraumatic, extra-ocular movements intact, pupils equal and

 reactive to light bilaterally, mucous membranes moist, indurated and 

erythematous area of the neck extending over the sternocleidomastoid covering 

the anterior periauricular area, earlobe.  No fluctuation noted.  Intraorally 

there is no drainage coming from the buccal mucosa, external auditory canal is 

full of cerumen making visualization of the tympanic membrane instructed


Cardiovascular: Heart regular rate and rhythm


Chest: no retractions, no tachypnea


Abdomen: abdomen soft, non-tender, non-distended, no organomegaly


Musculoskeletal: Pulses present and equal in all extremities, no peripheral 

edema


Motor:  no focal deficits noted


Neurological: CN II-XII grossly intact, no focal motor or sensory deficits noted


Skin: Intact with no visualized rashes

















Course


                                   Vital Signs











  23





  12:22 15:59


 


Temperature 98.0 F 


 


Pulse Rate 102 H 101 H


 


Respiratory 18 14





Rate  


 


Blood Pressure 113/79 127/66


 


O2 Sat by Pulse 99 94 L





Oximetry  














- Reevaluation(s)


Reevaluation #1: 





23 15:55





CT showed diffuse cellulitis without any discernible abscess.  Patient has a 

leukocytosis of 24.9.  Lactic acid is 2.7.  Patient's blood pressures remained 

normotensive.  Patient not in severe sepsis.








EKG Findings





- EKG Comments:


EKG Findings:: My EKG interpretation: Ventricular rate 91, sinus rhythm,.  

Interval 155, , QTc 446. No OH prolongation, no QTC prolongation, no ST 

or T-wave changes noted.   Overall, this EKG is unremarkable





Procedures





- Sepsis


  ** Sepsis Focused Exam #1


Time Sepsis Criteria Met: 15:30


Sepsis Focused Exam Date: 23


Sepsis Focused Exam Time: 15:


Sepsis Focused Exam Complete: Yes


Vital Signs & RN Notes Reviewed: Yes


Capillary Refill: < 2 Seconds: Fingers, Toes


Peripheral Pulses: Normal: Radial (R), Radial (L), Posterior Tibialis (R), 

Posterior Tibialis (L), Dorsalis Pedis (R), Dorsalis Pedis (L)


Skin Color: Normal for Patient


Respiratory Exam: normal lung sounds


Cardiovascular Exam: regular rate





Medical Decision Making





- Medical Decision Making


Was pt. sent in by a medical professional or institution (, PA, NP, urgent 

care, hospital, or nursing home...) When possible be specific


@  -Nursing home


Did you speak to anyone other than the patient for history (EMS, parent, family,

 police, friend...)? What history was obtained from this source 


@  -EMS, nursing home documentation


Did you review nursing and triage notes (agree or disagree)?  Why? 


@  -I reviewed and agree with nursing and triage notes


Were old charts reviewed (outside hosp., previous admission, EMS record, old 

EKG, old radiological studies, urgent care reports/EKG's, nursing home records)?

 Report findings 


@  -Nursing home documentations were reviewed


Differential Diagnosis (chest pain, altered mental status, abdominal pain women,

 abdominal pain men, vaginal bleeding, musculoskeletal, weakness, fever, 

dyspnea, syncope, headache, dizziness, GI bleed, back pain, seizure, CVA, p

alpatations, mental health)? 


@  -Cellulitis, abscess, shingles, phlegmon


EKG interpreted by me (3pts min.).


@  -See above


X-rays interpreted by me (1pt min.).


@  -None done


CT interpreted by me (1pt min.).


@  -CT soft tissue neck with contrast shows Cellulitis of the left neck


U/S interpreted by me (1pt. min.).


@  -None done


What testing was considered but not performed or refused? (CT, X-rays, U/S, 

labs)? Why?


@  -None


What meds were considered but not given or refused? Why?


@  -None


Did you discuss the management of the patient with other professionals 

(professionals i.e. , PA, NP, lab, RT, psych nurse, , , t

eacher, , )? Give summary


@  -Imaging, labs, presentation discussed with Dr. Sullivan for admission


Was smoking cessation discussed for >3mins.?


@  -No


Was critical care preformed (if so, how long)?


@  -No


Were there social determinants of health that impacted care today? How? 

(Homelessness, low income, unemployed, alcoholism, drug addiction, 

transportation, low edu. Level, literacy, decrease access to med. care, [facility], 

rehab)?


@  -No


Was there de-escalation of care discussed even if they declined (Discuss DNR or 

withdrawal of care, Hospice)? DNR status


@  -No


What co-morbidities impacted this encounter? (DM, HTN, Smoking, COPD, CAD, 

Cancer, CVA, ARF, Chemo, Hep., AIDS, mental health diagnosis, sleep apnea, 

morbid obesity)?


@  -None


Was patient admitted / discharged? Hospital course, mention meds given and 

route, prescriptions, significant lab abnormalities, going to OR and other 

pertinent info.


@  -77 Year-old female presents emergency department with cellulitis of the left

 neck.  Vital signs are stable.  She has leukocytosis 24.9.  Patient started on 

Unasyn.  Patient does not meet criteria for severe sepsis.  Admitted with 

consultation to infectious disease and ENT


Undiagnosed new problem with uncertain prognosis?


@  -No


Drug Therapy requiring intensive monitoring for toxicity (Heparin, Nitro, 

Insulin, Cardizem)?


@  -No


Were any procedures done?


@  -No


Diagnosis/symptom?  Acute, or Chronic, or Acute on Chronic?  Uncomplicated 

(without systemic symptoms) or Complicated (systemic symptoms)?


@  -1. Acute left neck cellulitis


Side effects of treatment?


@  -No


Exacerbation, Progression, or Severe Exacerbation?


@  -No


Poses a threat to life or bodily function? How? (Chest pain, USA, MI, pneumonia,

 PE, COPD, DKA, ARF, appy, cholecystitis, CVA, Diverticulitis, Homicidal, S

uicidal, threat to staff... and all critical care pts)


@  -yes








- Lab Data


Result diagrams: 


                                 23 13:05





                                 23 13:05


                                   Lab Results











  23 Range/Units





  13:05 13:05 13:05 


 


WBC  24.9 H    (3.8-10.6)  k/uL


 


RBC  4.95    (3.80-5.40)  m/uL


 


Hgb  13.6    (11.4-16.0)  gm/dL


 


Hct  42.2    (34.0-46.0)  %


 


MCV  85.3    (80.0-100.0)  fL


 


MCH  27.6    (25.0-35.0)  pg


 


MCHC  32.3    (31.0-37.0)  g/dL


 


RDW  15.1    (11.5-15.5)  %


 


Plt Count  304    (150-450)  k/uL


 


MPV  8.7    


 


Neutrophils %  86    %


 


Lymphocytes %  8    %


 


Monocytes %  3    %


 


Eosinophils %  1    %


 


Basophils %  0    %


 


Neutrophils #  21.5 H    (1.3-7.7)  k/uL


 


Lymphocytes #  2.0    (1.0-4.8)  k/uL


 


Monocytes #  0.8    (0-1.0)  k/uL


 


Eosinophils #  0.3    (0-0.7)  k/uL


 


Basophils #  0.1    (0-0.2)  k/uL


 


PT   11.7   (9.0-12.0)  sec


 


INR   1.1   (<1.2)  


 


APTT   32.5 H   (22.0-30.0)  sec


 


Sodium    137  (137-145)  mmol/L


 


Potassium    3.8  (3.5-5.1)  mmol/L


 


Chloride    100  ()  mmol/L


 


Carbon Dioxide    27  (22-30)  mmol/L


 


Anion Gap    10  mmol/L


 


BUN    38 H  (7-17)  mg/dL


 


Creatinine    0.71  (0.52-1.04)  mg/dL


 


Est GFR (CKD-EPI)AfAm    >90  (>60 ml/min/1.73 sqM)  


 


Est GFR (CKD-EPI)NonAf    82  (>60 ml/min/1.73 sqM)  


 


Glucose    129 H  (74-99)  mg/dL


 


Lactic Ac Sepsis Rflx     


 


Plasma Lactic Acid Christian     (0.7-2.0)  mmol/L


 


Calcium    9.2  (8.4-10.2)  mg/dL


 


Magnesium    2.2  (1.6-2.3)  mg/dL


 


Total Bilirubin    2.1 H  (0.2-1.3)  mg/dL


 


AST    76 H  (14-36)  U/L


 


ALT    53 H  (4-34)  U/L


 


Alkaline Phosphatase    122  ()  U/L


 


Total Protein    6.4  (6.3-8.2)  g/dL


 


Albumin    3.3 L  (3.5-5.0)  g/dL














  23 Range/Units





  13:05 13:56 


 


WBC    (3.8-10.6)  k/uL


 


RBC    (3.80-5.40)  m/uL


 


Hgb    (11.4-16.0)  gm/dL


 


Hct    (34.0-46.0)  %


 


MCV    (80.0-100.0)  fL


 


MCH    (25.0-35.0)  pg


 


MCHC    (31.0-37.0)  g/dL


 


RDW    (11.5-15.5)  %


 


Plt Count    (150-450)  k/uL


 


MPV    


 


Neutrophils %    %


 


Lymphocytes %    %


 


Monocytes %    %


 


Eosinophils %    %


 


Basophils %    %


 


Neutrophils #    (1.3-7.7)  k/uL


 


Lymphocytes #    (1.0-4.8)  k/uL


 


Monocytes #    (0-1.0)  k/uL


 


Eosinophils #    (0-0.7)  k/uL


 


Basophils #    (0-0.2)  k/uL


 


PT    (9.0-12.0)  sec


 


INR    (<1.2)  


 


APTT    (22.0-30.0)  sec


 


Sodium    (137-145)  mmol/L


 


Potassium    (3.5-5.1)  mmol/L


 


Chloride    ()  mmol/L


 


Carbon Dioxide    (22-30)  mmol/L


 


Anion Gap    mmol/L


 


BUN    (7-17)  mg/dL


 


Creatinine    (0.52-1.04)  mg/dL


 


Est GFR (CKD-EPI)AfAm    (>60 ml/min/1.73 sqM)  


 


Est GFR (CKD-EPI)NonAf    (>60 ml/min/1.73 sqM)  


 


Glucose    (74-99)  mg/dL


 


Lactic Ac Sepsis Rflx   Y  


 


Plasma Lactic Acid Christian  2.7 H*   (0.7-2.0)  mmol/L


 


Calcium    (8.4-10.2)  mg/dL


 


Magnesium    (1.6-2.3)  mg/dL


 


Total Bilirubin    (0.2-1.3)  mg/dL


 


AST    (14-36)  U/L


 


ALT    (4-34)  U/L


 


Alkaline Phosphatase    ()  U/L


 


Total Protein    (6.3-8.2)  g/dL


 


Albumin    (3.5-5.0)  g/dL














Disposition


Clinical Impression: 


 Cellulitis, neck





Disposition: ADMITTED AS IP TO THIS [facility]


Condition: Serious


Referrals: 


Florence Sullivan MD [Primary Care Provider] - 1-2 days


Decision Time: 15:45

## 2023-05-17 LAB
ALBUMIN SERPL-MCNC: 2.9 G/DL (ref 3.8–4.9)
ALBUMIN/GLOB SERPL: 1.12 G/DL (ref 1.6–3.17)
ALP SERPL-CCNC: 105 U/L (ref 41–126)
ALT SERPL-CCNC: 60 U/L (ref 8–44)
ANION GAP SERPL CALC-SCNC: 13.8 MMOL/L (ref 10–18)
AST SERPL-CCNC: 72 U/L (ref 13–35)
BASOPHILS # BLD AUTO: 0.06 X 10*3/UL (ref 0–0.1)
BASOPHILS NFR BLD AUTO: 0.3 %
BUN SERPL-SCNC: 31.2 MG/DL (ref 9–27)
BUN/CREAT SERPL: 44.57 RATIO (ref 12–20)
CALCIUM SPEC-MCNC: 8.9 MG/DL (ref 8.7–10.3)
CHLORIDE SERPL-SCNC: 105 MMOL/L (ref 96–109)
CO2 SERPL-SCNC: 22.2 MMOL/L (ref 20–27.5)
EOSINOPHIL # BLD AUTO: 0.04 X 10*3/UL (ref 0.04–0.35)
EOSINOPHIL NFR BLD AUTO: 0.2 %
ERYTHROCYTE [DISTWIDTH] IN BLOOD BY AUTOMATED COUNT: 4.43 X 10*6/UL (ref 4.1–5.2)
ERYTHROCYTE [DISTWIDTH] IN BLOOD: 15.9 % (ref 11.5–14.5)
GLOBULIN SER CALC-MCNC: 2.6 G/DL (ref 1.6–3.3)
GLUCOSE SERPL-MCNC: 106 MG/DL (ref 70–110)
HCT VFR BLD AUTO: 38.1 % (ref 37.2–46.3)
HGB BLD-MCNC: 12.1 G/DL (ref 12–15)
IMM GRANULOCYTES BLD QL AUTO: 0.7 %
LYMPHOCYTES # SPEC AUTO: 2.53 X 10*3/UL (ref 0.9–5)
LYMPHOCYTES NFR SPEC AUTO: 11.8 %
MCH RBC QN AUTO: 27.3 PG (ref 27–32)
MCHC RBC AUTO-ENTMCNC: 31.8 G/DL (ref 32–37)
MCV RBC AUTO: 86 FL (ref 80–97)
MONOCYTES # BLD AUTO: 1.28 X 10*3/UL (ref 0.2–1)
MONOCYTES NFR BLD AUTO: 6 %
NEUTROPHILS # BLD AUTO: 17.39 X 10*3/UL (ref 1.8–7.7)
NEUTROPHILS NFR BLD AUTO: 81 %
NRBC BLD AUTO-RTO: 0 /100 WBCS (ref 0–0)
PLATELET # BLD AUTO: 324 X 10*3/UL (ref 140–440)
POTASSIUM SERPL-SCNC: 3.6 MMOL/L (ref 3.5–5.5)
PROT SERPL-MCNC: 5.5 G/DL (ref 6.2–8.2)
SODIUM SERPL-SCNC: 141 MMOL/L (ref 135–145)
WBC # BLD AUTO: 21.45 X 10*3/UL (ref 4.5–10)

## 2023-05-17 RX ADMIN — APIXABAN SCH MG: 5 TABLET, FILM COATED ORAL at 08:15

## 2023-05-17 RX ADMIN — CEFAZOLIN SCH MLS/HR: 330 INJECTION, POWDER, FOR SOLUTION INTRAMUSCULAR; INTRAVENOUS at 08:21

## 2023-05-17 RX ADMIN — AMPICILLIN SODIUM AND SULBACTAM SODIUM SCH MLS/HR: 2; 1 INJECTION, POWDER, FOR SOLUTION INTRAMUSCULAR; INTRAVENOUS at 17:14

## 2023-05-17 RX ADMIN — AMPICILLIN SODIUM AND SULBACTAM SODIUM SCH MLS/HR: 2; 1 INJECTION, POWDER, FOR SOLUTION INTRAMUSCULAR; INTRAVENOUS at 12:35

## 2023-05-17 RX ADMIN — LOSARTAN POTASSIUM AND HYDROCHLOROTHIAZIDE SCH EACH: 12.5; 5 TABLET ORAL at 08:15

## 2023-05-17 RX ADMIN — Medication SCH MCG: at 15:00

## 2023-05-17 RX ADMIN — APIXABAN SCH MG: 5 TABLET, FILM COATED ORAL at 22:02

## 2023-05-17 RX ADMIN — CEFAZOLIN SCH MLS/HR: 330 INJECTION, POWDER, FOR SOLUTION INTRAMUSCULAR; INTRAVENOUS at 22:02

## 2023-05-17 RX ADMIN — SENNOSIDES SCH MG: 8.6 TABLET, FILM COATED ORAL at 08:15

## 2023-05-17 NOTE — P.GSCN
History of Present Illness


Consult date: 23


Reason for Consult: 





Left neck swelling


Requesting physician: Florence Sullivan


History of present illness: 





This is a 77-year-old white female presents to the emergency room with left neck

swelling and altered alertness.  The nursing home noted swelling to the left 

neck she also was having a hard time taking foods.  She has dementia which has 

worsened over the last 48 hours.  The staff noted a significant swelling to the 

left neck and the patient was admitted with left neck swelling.  CAT scan was 

performed demonstrating significant swelling over the left parotid and 

submandibular region.  Patient is relatively dehydrated.  She is not able to 

provide a history because of her dementia.  She is also poorly cooperative for 

examination.  The left neck is tender.  She has been seen by infectious disease 

and was placed on Unasyn.





Review of Systems


ROS unobtainable: due to mental status





Past Medical History


Past Medical History: Cancer, COPD, Dementia, GERD/Reflux, Hyperlipidemia, 

Hypertension, Osteoarthritis (OA), Renal Disease, Syncope


Additional Past Medical History / Comment(s): SYNCOPAL EPISODES RECENTLY, 

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

WITH REMOVAL.


History of Any Multi-Drug Resistant Organisms: None Reported


Past Surgical History: Hysterectomy, Tonsillectomy


Additional Past Surgical History / Comment(s): SKIN CANCER REMOVALS, 

COLONOSCOPY.


Past Anesthesia/Blood Transfusion Reactions: No Reported Reaction


Past Psychological History: Bipolar


Additional Psychological History / Comment(s): Patient is resident at [facility]


Smoking Status: Former smoker


Past Alcohol Use History: None Reported


Past Drug Use History: None Reported





- Past Family History


  ** Mother


Additional Family Medical History / Comment(s): MOTHER HAD A "BAD HEART."





  ** Father


Additional Family Medical History / Comment(s): FATHER  IN A PLANE CRASH 

EARLIER IN LIFE.





Medications and Allergies


                                Home Medications











 Medication  Instructions  Recorded  Confirmed  Type


 


Acetaminophen [Acetaminophen ER] 650 mg PO Q6H PRN 22 History


 


Magnesium Hydroxide [Milk of 2,400 mg PO Q72H PRN 22 History





Magnesia]    


 


bisacodyL 10 mg RECTAL DAILY PRN 22 History


 


Apixaban [Eliquis] 5 mg PO BID 22 History


 


Cholecalciferol [Vitamin D3 (25 25 mcg PO DAILY@1600 22 History





Mcg = 1000 Iu)]    


 


Acetaminophen [Tylenol] 650 mg PO Q6H PRN 23 History


 


Healthshake 1 can PO TID-W/MEALS 23 History


 


Losartan-Hctz 50-12.5 mg [Hyzaar 1 tab PO DAILY 23 History





50-12.5]    


 


Prostat  30 ml PO DAILY@0800 23 History


 


Mirtazapine [Remeron Soluspan] 30 mg PO HS 23 History


 


Sennosides [Senokot] 8.6 mg PO DAILY 23 History








                                    Allergies











Allergy/AdvReac Type Severity Reaction Status Date / Time


 


No Known Allergies Allergy   Verified 23 14:09














Surgical - Exam


Osteopathic Statement: *.  No significant issues noted on an osteopathic 

structural exam other than those noted in the History and Physical/Consult.


                                   Vital Signs











Temp Pulse Resp BP Pulse Ox


 


 98.0 F   102 H  18   113/79   99 


 


 23 12:22  23 12:22  23 12:22  23 12:23 12:22














- General


moderate pain, cachectic, chronically ill





- Eyes


PERRL, normal ocular movement





- ENT





Head is normocephalic the face is symmetric there are no abnormal movements.  

There is tenderness over the left neck with a large amount of the left neck 

swelling exquisitely tender hard and firm centered over the left parotid.  

Trachea is in the midline unable to palpate thyroid mouth is dry mucosa is dry 

nasal mucosa is dry no lesions are seen.  Patient is poorly cooperative upon 

physical exam


normal pinna, normal nares, no normal mucosa





- Neck





Severe swelling to the left neck centered over the left parotid hard firm tender





- Integumentary





Erythema over the left neck swelling





- Neurologic





Patient is uncooperative confused


confused





Results





- Labs





                                 23 06:41





                                 23 06:41


                  Abnormal Lab Results - Last 24 Hours (Table)











  23 Range/Units





  06:41 06:41 


 


WBC  21.45 H   (4.50-10.00)  X 10*3/uL


 


MCHC  31.8 L   (32.0-37.0)  g/dL


 


RDW  15.9 H   (11.5-14.5)  %


 


Immature Gran #  0.15 H   (0.00-0.04)  X 10*3/uL


 


Neutrophils #  17.39 H   (1.80-7.70)  X 10*3/uL


 


Monocytes #  1.28 H   (0.20-1.00)  X 10*3/uL


 


BUN   31.2 H  (9.0-27.0)  mg/dL


 


BUN/Creatinine Ratio   44.57 H  (12.00-20.00)  Ratio


 


Total Bilirubin   1.40 H  (0.30-1.20)  mg/dL


 


AST   72 H  (13-35)  U/L


 


ALT   60 H  (8-44)  U/L


 


Total Protein   5.5 L  (6.2-8.2)  g/dL


 


Albumin   2.9 L  (3.8-4.9)  g/dL


 


Albumin/Globulin Ratio   1.12 L  (1.60-3.17)  g/dL








                                 Diabetes panel











  23 Range/Units





  06:41 


 


Sodium  141  (135-145)  mmol/L


 


Potassium  3.6  (3.5-5.5)  mmol/L


 


Chloride  105  ()  mmol/L


 


Carbon Dioxide  22.2  (20.0-27.5)  mmol/L


 


BUN  31.2 H  (9.0-27.0)  mg/dL


 


Creatinine  0.7  (0.6-1.5)  mg/dL


 


Glucose  106  ()  mg/dL


 


Calcium  8.9  (8.7-10.3)  mg/dL


 


AST  72 H  (13-35)  U/L


 


ALT  60 H  (8-44)  U/L


 


Alkaline Phosphatase  105  ()  U/L


 


Total Protein  5.5 L  (6.2-8.2)  g/dL


 


Albumin  2.9 L  (3.8-4.9)  g/dL








                                  Calcium panel











  23 Range/Units





  06:41 


 


Calcium  8.9  (8.7-10.3)  mg/dL


 


Albumin  2.9 L  (3.8-4.9)  g/dL








                                 Pituitary panel











  23 Range/Units





  06:41 


 


Sodium  141  (135-145)  mmol/L


 


Potassium  3.6  (3.5-5.5)  mmol/L


 


Chloride  105  ()  mmol/L


 


Carbon Dioxide  22.2  (20.0-27.5)  mmol/L


 


BUN  31.2 H  (9.0-27.0)  mg/dL


 


Creatinine  0.7  (0.6-1.5)  mg/dL


 


Glucose  106  ()  mg/dL


 


Calcium  8.9  (8.7-10.3)  mg/dL








                                  Adrenal panel











  23 Range/Units





  06:41 


 


Sodium  141  (135-145)  mmol/L


 


Potassium  3.6  (3.5-5.5)  mmol/L


 


Chloride  105  ()  mmol/L


 


Carbon Dioxide  22.2  (20.0-27.5)  mmol/L


 


BUN  31.2 H  (9.0-27.0)  mg/dL


 


Creatinine  0.7  (0.6-1.5)  mg/dL


 


Glucose  106  ()  mg/dL


 


Calcium  8.9  (8.7-10.3)  mg/dL


 


Total Bilirubin  1.40 H  (0.30-1.20)  mg/dL


 


AST  72 H  (13-35)  U/L


 


ALT  60 H  (8-44)  U/L


 


Alkaline Phosphatase  105  ()  U/L


 


Total Protein  5.5 L  (6.2-8.2)  g/dL


 


Albumin  2.9 L  (3.8-4.9)  g/dL














Assessment and Plan


(1) Acute parotitis


Current Visit: Yes   Status: Acute   Code(s): K11.21 - ACUTE SIALOADENITIS   

SNOMED Code(s): 15209223


   





(2) Cellulitis, neck


Current Visit: Yes   Status: Acute   Code(s): L03.221 - CELLULITIS OF NECK   

SNOMED Code(s): 42512655


   


Plan: 





Patient is relatively dehydrated and from her dehydrated state she's developed a

left parotid infection acute.  The swelling is quite pronounced.  I agree with 

Unasyn as the antibiotic.  Blood cultures if they haven't already been obtained.

 Increased hydration is recommended.  Close clinical follow-up is needed.  Rest 

with head elevated at a 20 angle would be helpful to reduce the swelling.  

Topical treatment with warm compresses are not needed.  Thank you for alarming 

to participate in the care of this patient.  I see no abscess or need for any 

surgical intervention.  I would be happy to reconsult if there is any changes 

noted.


Time with Patient: Greater than 30

## 2023-05-17 NOTE — P.HPIM
History of Present Illness


H&P Date: 23


Chief Complaint: Left-sided facial cellulitis with  parotitis 





HISTORY OF PRESENT ILLNESS


 


This is a 77-year-old female patient with past medical history of COPD, 

hypertension, hyperlipidemia, bipolar disorder, dementia, left lower extremity 

DVT on eliquis, clavicular fracture, history of the myotonic clonic jerks,  She 

has a history of hospitalization due to acute rhabdomyolysis.  Patient is a 

long-term resident at [facility].  Patient was sent into [facility] after the nursing staff at [facility] [place] found her to have a

significant swelling and increased hardness of the left side of her face and the

parotid gland that was exquisitely tender to touch, with mental status changes 

and not able to eat or drink anything, Pinon Reed is directed to send the 

patient to the ER via EMS patient was seen and evaluated in the emergency depart

ment and she had a computed tomography scan that showed evidence of significance

of Lantus without evidence of an actual abscess, patient appeared to have a 

significant parotitis with significant left-sided facial cellulitis.  Patient 

was started on IV antibiotic in the form of Unasyn, she was admitted to the 

hospital infectious disease consultation as well as ENT consultation was 

obtained.





REVIEW OF SYSTEMS


 Constitutional: No documented fever, no chills, chronic weight loss.  Chronic 

weakness, fatigue or lethargy.  Noted daytime sleepiness.


 HEENT: No headache. No blurred vision or double vision. No nasal drainage or 

congestion. No epistaxis. 


 Lungs: No shortness of breath, cough, no sputum production. No wheezing.


 Cardiovascular: No chest pain, no lower extremity edema.  Reported syncopal 

episode.


 Abdominal: No abdominal pain. No nausea, vomiting. No diarrhea. No 

constipation. No bloody or tarry stools.  Chronic loss of appetite.


 Genitourinary: No dysuria, increased frequency, urgency. No urinary retention.


 Musculoskeletal: No myalgias.  Chronic muscle weakness, chronic gait 

dysfunction, previous falls. No back pain. No neck pain.


 Integumentary: Pre-decub changes in the sacral area.


 Neurologic: no aphasia. No facial droop.  Chronic confusion secondary to 

dementia. No head injury. No headache. No paralysis. No paresthesia.


 Endocrine: No abnormal blood sugars. No weight change. No excessive sweating or

thirst. No cold intolerance.


 





MEDICAL HISTORY


 


COPD


 Hypertension


 Hyperlipidemia


 Dementia


 Bipolar disorder


 Nephrolithiasis 


 Left lower extremity DVT


 Myotonic clonic jerks.





SURGICAL HISTORY


 Hysterectomy


 Tonsillectomy


 Skin cancer removal


 Colonoscopy








 SOCIAL HISTORY


 Patient is a smoker one pack per day starting at age 15 and quit 6 months ago. 

She denies any alcohol use, marijuana use or illicit drug use. She resides at 

[facility].  Patient has a public guardian.








FAMILY HISTORY


 Mother  in her 70s from coronary artery disease. Father  in his 50s 

from a plane accident. She does not have a brothers. Patient is one sister that 

is past with MS. Patient has one daughter with thyroid problems. Patient has one

son thatdied 6 years ago from a brain hemorrhage.








 PHYSICAL EXAMINATION


 Gen: This is a thin cachectic appearing 77-year-old  female.  Patient 

is resting in bed and appears to be comfortable and in no acute distress.


 HEENT: Head is atraumatic left-sided facial sweating with significant 

tenderness of the left parotid area was significant hard like a rock texture. 

Pupils equal, round. Sclerae is anicteric, mucous membranes of the mouth is dry.


 NECK: Supple, there is significant hardness to the left side of the neck and 

left parotid gland


 LUNGS: decreased breath sounds at the bases with few ronchi, no expiratory 

wheezes, no chest wall tenderness


 HEART: First heart sound is depressed, second heart sound is normal there is 

CHRISTOPHER 2/6 located a the left sternal border.


 ABDOMEN: Soft. Bowel sounds are present. No masses. No tenderness.


 EXTREMITIES: No pedal edema. No calf tenderness.DP +1 bilaterally


 NEUROLOGICAL: Patient is awake,alert and oriented to person, chronic confusion 

secondary to underlying dementia.  Generalized weakness to upper and lower 

extremities.  








 ASSESSMENT AND PLAN





1.  Left facial cellulitis involving the submandibular gland as well as left p

arotitis.  No myles abscess as of yet, continue Unasyn, may need to add 

Vancomycin await infectious disease consultation, continue Unasyn for now, blood

cultures were obtained, continue IV fluid resuscitation the form of normal 

saline 75 mL an hour, continue Tylenol for pain control, ENT evaluation is 

ordered.  


 


2.  Metabolic encephalopathy secondary to underlying dementia as well as left 

facial cellulitis with parotitis and submandibular gland infection with sepsis 

present on admission.  Blood cultures were obtained, continue IV fluid 

resuscitation the form of normal saline at 75 mL an hour continue IV antibiotic 

in the form of Unasyn 3 g IV piggyback every 6 hours, monitor the patient's 

symptoms very closely.  





3.  History of DVT left lower extremity.  Patient continued on eliquis 5 mg 

twice daily.





4.  Generalized debility with worsening symptoms and difficulty performing her 

own ADLs, requiring more encouragement.  There has been consideration for 

hospice care at the nursing home.





5.  Hypertension.  Patient will be continued on losartan/hydrochlorothiazide 

85181 0.5 mg daily daily.  





6.  Hyperlipidemia.  Patient is not currently on statin.





7.  COPD, stable.  No exacerbation.





8.  Remote history of tobacco use and dependence.  Patient quit smoking in .





9.  Bipolar disorder.  patient is not taking any medication at this point in 

time.  





10.  Severe protein calorie malnutrition, chronic.  Patient resumed on regular 

diet, did not do well with Remeron therefore it will be discontinued.  





11.  Myotonic clonic involuntary movements please consult neurology for further 

recommendation.  





12.  DVT prophylaxis.  Eliquis 5 mg orally twice every day.  





13.  GI prophylaxis.  Continue Protonix 40 mg orally once every day.  





14.  Admit to inpatient.  Estimate a length of stay 2 midnights.





15.  Patient has a public guardian.





16.  She is full code.





Past Medical History


Past Medical History: Cancer, COPD, Dementia, GERD/Reflux, Hyperlipidemia, 

Hypertension, Osteoarthritis (OA), Renal Disease, Syncope


Additional Past Medical History / Comment(s): SYNCOPAL EPISODES RECENTLY, 

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

WITH REMOVAL.


History of Any Multi-Drug Resistant Organisms: None Reported


Past Surgical History: Hysterectomy, Tonsillectomy


Additional Past Surgical History / Comment(s): SKIN CANCER REMOVALS, 

COLONOSCOPY.


Past Anesthesia/Blood Transfusion Reactions: No Reported Reaction


Past Psychological History: Bipolar


Additional Psychological History / Comment(s): Patient is resident at [facility]


Smoking Status: Former smoker


Past Alcohol Use History: None Reported


Past Drug Use History: None Reported





- Past Family History


  ** Mother


Additional Family Medical History / Comment(s): MOTHER HAD A "BAD HEART."





  ** Father


Additional Family Medical History / Comment(s): FATHER  IN A PLANE CRASH 

EARLIER IN LIFE.





Medications and Allergies


                                Home Medications











 Medication  Instructions  Recorded  Confirmed  Type


 


Acetaminophen [Acetaminophen ER] 650 mg PO Q6H PRN 22 History


 


Magnesium Hydroxide [Milk of 2,400 mg PO Q72H PRN 22 History





Magnesia]    


 


bisacodyL 10 mg RECTAL DAILY PRN 22 History


 


Apixaban [Eliquis] 5 mg PO BID 22 History


 


Cholecalciferol [Vitamin D3 (25 25 mcg PO DAILY@1600 22 History





Mcg = 1000 Iu)]    


 


Acetaminophen [Tylenol] 650 mg PO Q6H PRN 23 History


 


Healthshake 1 can PO TID-W/MEALS 23 History


 


Losartan-Hctz 50-12.5 mg [Hyzaar 1 tab PO DAILY 23 History





50-12.5]    


 


Prostat  30 ml PO DAILY@0800 23 History


 


Mirtazapine [Remeron Soluspan] 30 mg PO HS 23 History


 


Sennosides [Senokot] 8.6 mg PO DAILY 23 History








                                    Allergies











Allergy/AdvReac Type Severity Reaction Status Date / Time


 


No Known Allergies Allergy   Verified 23 14:09














Physical Exam


Vitals: 


                                   Vital Signs











  Temp Pulse Pulse Resp BP BP Pulse Ox


 


 23 13:15  99.1 F   65  18   119/66  100


 


 23 07:50  97.7 F   71  18   125/77  100


 


 23 01:00    87  15   


 


 23 00:00  98.5 F   87  15   120/76  97


 


 23 23:45  98.2 F  78   16  112/76   98


 


 23 20:24  97.4 F L  80   14  119/74   98


 


 23 19:00   98   18  121/71   94 L








                                Intake and Output











 23





 06:59 14:59 22:59


 


Intake Total 525  


 


Balance 525  


 


Intake:   


 


  Intake, IV Titration 525  





  Amount   


 


    Sodium Chloride 0.9% 1, 525  





    000 ml @ 75 mls/hr IV .   





    [id number] [facility] Rx#:[id number]   


 


Other:   


 


  Voiding Method External Catheter External Catheter 


 


  # Voids   1














Results


CBC & Chem 7: 


                                 23 06:41





                                 23 06:41


Labs: 


                  Abnormal Lab Results - Last 24 Hours (Table)











  23 Range/Units





  06:41 06:41 


 


WBC  21.45 H   (4.50-10.00)  X 10*3/uL


 


MCHC  31.8 L   (32.0-37.0)  g/dL


 


RDW  15.9 H   (11.5-14.5)  %


 


Immature Gran #  0.15 H   (0.00-0.04)  X 10*3/uL


 


Neutrophils #  17.39 H   (1.80-7.70)  X 10*3/uL


 


Monocytes #  1.28 H   (0.20-1.00)  X 10*3/uL


 


BUN   31.2 H  (9.0-27.0)  mg/dL


 


BUN/Creatinine Ratio   44.57 H  (12.00-20.00)  Ratio


 


Total Bilirubin   1.40 H  (0.30-1.20)  mg/dL


 


AST   72 H  (13-35)  U/L


 


ALT   60 H  (8-44)  U/L


 


Total Protein   5.5 L  (6.2-8.2)  g/dL


 


Albumin   2.9 L  (3.8-4.9)  g/dL


 


Albumin/Globulin Ratio   1.12 L  (1.60-3.17)  g/dL














Thrombosis Risk Factor Assmnt





- Choose All That Apply


Any of the Below Risk Factors Present?: Yes


Other Risk Factors: Yes


Each Risk Factor Represents 2 Points: Patient confined to bed


Each Risk Factor Represents 3 Points: Age 75 years or older


Other congenital or acquired thrombophilia - If yes, enter type in comment: No


Thrombosis Risk Factor Assessment Total Risk Factor Score: 5


Thrombosis Risk Factor Assessment Level: High Risk

## 2023-05-17 NOTE — P.CONS
History of Present Illness





- Reason for Consult


Consult date: 23





- History of Present Illness





Patient is a 77-year-old  female with a past medical his significant 

for COPD hypertension hyperlipidemia bipolar disorder and dementia resident of 

[facility] patient  was noticed to have a significant swelling 

and hardness on the left side of the face for which the patient has been sent to

the ER for further evaluation on presentation to the hospital patient was afe

brile and no fever has been recorded subsequently patient did have vitamin 24.9 

with a left shift kidney function has been normal lactic acid was elevated liver

enzymes are elevated patient did have a soft tissue CT increased signal through 

the left neck subcutaneous and deeper tissue extending to the adjacent 

submandibular and parotid gland findings compatible with infection patient was 

given a dose of Unasyn has been admitted to the hospital infectious disease was 

consulted for further management of antibiotic therapy most information has been

obtained from the chart as patient has evaluated good historian no vomiting or 

diarrhea has been reported





Past Medical History


Past Medical History: Cancer, COPD, Dementia, GERD/Reflux, Hyperlipidemia, 

Hypertension, Osteoarthritis (OA), Renal Disease, Syncope


Additional Past Medical History / Comment(s): SYNCOPAL EPISODES RECENTLY, 

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

WITH REMOVAL.


History of Any Multi-Drug Resistant Organisms: None Reported


Past Surgical History: Hysterectomy, Tonsillectomy


Additional Past Surgical History / Comment(s): SKIN CANCER REMOVALS, 

COLONOSCOPY.


Past Anesthesia/Blood Transfusion Reactions: No Reported Reaction


Past Psychological History: Bipolar


Additional Psychological History / Comment(s): Patient is resident at [facility]


Smoking Status: Former smoker


Past Alcohol Use History: None Reported


Past Drug Use History: None Reported





- Past Family History


  ** Mother


Additional Family Medical History / Comment(s): MOTHER HAD A "BAD HEART."





  ** Father


Additional Family Medical History / Comment(s): FATHER  IN A PLANE CRASH 

EARLIER IN LIFE.





Medications and Allergies


                                Home Medications











 Medication  Instructions  Recorded  Confirmed  Type


 


Acetaminophen [Acetaminophen ER] 650 mg PO Q6H PRN 22 History


 


Magnesium Hydroxide [Milk of 2,400 mg PO Q72H PRN 22 History





Magnesia]    


 


bisacodyL 10 mg RECTAL DAILY PRN 22 History


 


Apixaban [Eliquis] 5 mg PO BID 22 History


 


Cholecalciferol [Vitamin D3 (25 25 mcg PO DAILY@1600 22 History





Mcg = 1000 Iu)]    


 


Acetaminophen [Tylenol] 650 mg PO Q6H PRN 23 History


 


Healthshake 1 can PO TID-W/MEALS 23 History


 


Losartan-Hctz 50-12.5 mg [Hyzaar 1 tab PO DAILY 23 History





50-12.5]    


 


Prostat  30 ml PO DAILY@0800 23 History


 


Mirtazapine [Remeron Soluspan] 30 mg PO HS 23 History


 


Sennosides [Senokot] 8.6 mg PO DAILY 23 History








                                    Allergies











Allergy/AdvReac Type Severity Reaction Status Date / Time


 


No Known Allergies Allergy   Verified 23 14:09














Physical Exam


Vitals: 


                                   Vital Signs











  Temp Pulse Pulse Resp BP BP Pulse Ox


 


 23 07:50  97.7 F   71  18   125/77  100


 


 23 01:00    87  15   


 


 23 00:00  98.5 F   87  15   120/76  97


 


 23 23:45  98.2 F  78   16  112/76   98


 


 23 20:24  97.4 F L  80   14  119/74   98


 


 23 19:00   98   18  121/71   94 L


 


 23 15:59   101 H   14  127/66   94 L


 


 23 12:22  98.0 F  102 H   18  113/79   99








                                Intake and Output











 23





 22:59 06:59 14:59


 


Intake Total  525 


 


Balance  525 


 


Intake:   


 


  Intake, IV Titration  525 





  Amount   


 


    Sodium Chloride 0.9% 1,  525 





    000 ml @ 75 mls/hr IV .   





    [id number] [facility] Rx#:[id number]   


 


Other:   


 


  Voiding Method  External Catheter External Catheter














Results


CBC & Chem 7: 


                                 23 06:34





                                 23 06:34


Labs: 


                  Abnormal Lab Results - Last 24 Hours (Table)











  23 Range/Units





  13:05 13:05 13:05 


 


WBC  24.9 H    (3.8-10.6)  k/uL


 


MCHC     (32.0-37.0)  g/dL


 


RDW     (11.5-14.5)  %


 


Immature Gran #     (0.00-0.04)  X 10*3/uL


 


Neutrophils #  21.5 H    (1.3-7.7)  k/uL


 


Monocytes #     (0.20-1.00)  X 10*3/uL


 


APTT   32.5 H   (22.0-30.0)  sec


 


BUN    38 H  (7-17)  mg/dL


 


Glucose    129 H  (74-99)  mg/dL


 


Plasma Lactic Acid Christian     (0.7-2.0)  mmol/L


 


Total Bilirubin    2.1 H  (0.2-1.3)  mg/dL


 


AST    76 H  (14-36)  U/L


 


ALT    53 H  (4-34)  U/L


 


Albumin    3.3 L  (3.5-5.0)  g/dL














  23 Range/Units





  13:05 06:41 


 


WBC   21.45 H  (3.8-10.6)  k/uL


 


MCHC   31.8 L  (32.0-37.0)  g/dL


 


RDW   15.9 H  (11.5-14.5)  %


 


Immature Gran #   0.15 H  (0.00-0.04)  X 10*3/uL


 


Neutrophils #   17.39 H  (1.3-7.7)  k/uL


 


Monocytes #   1.28 H  (0.20-1.00)  X 10*3/uL


 


APTT    (22.0-30.0)  sec


 


BUN    (7-17)  mg/dL


 


Glucose    (74-99)  mg/dL


 


Plasma Lactic Acid Christian  2.7 H*   (0.7-2.0)  mmol/L


 


Total Bilirubin    (0.2-1.3)  mg/dL


 


AST    (14-36)  U/L


 


ALT    (4-34)  U/L


 


Albumin    (3.5-5.0)  g/dL














Assessment and Plan


Plan: 


1patient presented to hospital with significant swelling to the left side of 

the face which is tender to touch and warm concerning for left-sided parotitis 

and involvement of the left submandibular gland could be related to the oral 

javed and less likely MRSA infection


2-we will start the patient on Unasyn 3 g every 6 hours


3-warm compression to the area


We will follow on clinical condition and cultures to further adjust medication 

if needed


Thank you for this consultation we will follow the patient along with you





Time with Patient: Greater than 30

## 2023-05-18 LAB
ALBUMIN SERPL-MCNC: 2.6 G/DL (ref 3.8–4.9)
ALBUMIN/GLOB SERPL: 1.13 G/DL (ref 1.6–3.17)
ALP SERPL-CCNC: 97 U/L (ref 41–126)
ALT SERPL-CCNC: 53 U/L (ref 8–44)
ANION GAP SERPL CALC-SCNC: 9.8 MMOL/L (ref 10–18)
AST SERPL-CCNC: 60 U/L (ref 13–35)
BASOPHILS # BLD AUTO: 0.04 X 10*3/UL (ref 0–0.1)
BASOPHILS NFR BLD AUTO: 0.3 %
BUN SERPL-SCNC: 18.8 MG/DL (ref 9–27)
BUN/CREAT SERPL: 37.6 RATIO (ref 12–20)
CALCIUM SPEC-MCNC: 8.3 MG/DL (ref 8.7–10.3)
CHLORIDE SERPL-SCNC: 105 MMOL/L (ref 96–109)
CO2 SERPL-SCNC: 24.2 MMOL/L (ref 20–27.5)
EOSINOPHIL # BLD AUTO: 0.35 X 10*3/UL (ref 0.04–0.35)
EOSINOPHIL NFR BLD AUTO: 2.4 %
ERYTHROCYTE [DISTWIDTH] IN BLOOD BY AUTOMATED COUNT: 3.95 X 10*6/UL (ref 4.1–5.2)
ERYTHROCYTE [DISTWIDTH] IN BLOOD: 15.6 % (ref 11.5–14.5)
GLOBULIN SER CALC-MCNC: 2.3 G/DL (ref 1.6–3.3)
GLUCOSE SERPL-MCNC: 70 MG/DL (ref 70–110)
HCT VFR BLD AUTO: 34.3 % (ref 37.2–46.3)
HGB BLD-MCNC: 10.7 G/DL (ref 12–15)
IMM GRANULOCYTES BLD QL AUTO: 0.6 %
LYMPHOCYTES # SPEC AUTO: 2.68 X 10*3/UL (ref 0.9–5)
LYMPHOCYTES NFR SPEC AUTO: 18.7 %
MCH RBC QN AUTO: 27.1 PG (ref 27–32)
MCHC RBC AUTO-ENTMCNC: 31.2 G/DL (ref 32–37)
MCV RBC AUTO: 86.8 FL (ref 80–97)
MONOCYTES # BLD AUTO: 0.8 X 10*3/UL (ref 0.2–1)
MONOCYTES NFR BLD AUTO: 5.6 %
NEUTROPHILS # BLD AUTO: 10.38 X 10*3/UL (ref 1.8–7.7)
NEUTROPHILS NFR BLD AUTO: 72.4 %
NRBC BLD AUTO-RTO: 0 /100 WBCS (ref 0–0)
PLATELET # BLD AUTO: 334 X 10*3/UL (ref 140–440)
POTASSIUM SERPL-SCNC: 3.3 MMOL/L (ref 3.5–5.5)
PROT SERPL-MCNC: 4.9 G/DL (ref 6.2–8.2)
SODIUM SERPL-SCNC: 139 MMOL/L (ref 135–145)
WBC # BLD AUTO: 14.33 X 10*3/UL (ref 4.5–10)

## 2023-05-18 RX ADMIN — AMPICILLIN SODIUM AND SULBACTAM SODIUM SCH MLS/HR: 2; 1 INJECTION, POWDER, FOR SOLUTION INTRAMUSCULAR; INTRAVENOUS at 17:29

## 2023-05-18 RX ADMIN — APIXABAN SCH MG: 5 TABLET, FILM COATED ORAL at 21:33

## 2023-05-18 RX ADMIN — APIXABAN SCH MG: 5 TABLET, FILM COATED ORAL at 09:18

## 2023-05-18 RX ADMIN — LOSARTAN POTASSIUM AND HYDROCHLOROTHIAZIDE SCH EACH: 12.5; 5 TABLET ORAL at 09:18

## 2023-05-18 RX ADMIN — CEFAZOLIN SCH MLS/HR: 330 INJECTION, POWDER, FOR SOLUTION INTRAMUSCULAR; INTRAVENOUS at 09:18

## 2023-05-18 RX ADMIN — SENNOSIDES SCH MG: 8.6 TABLET, FILM COATED ORAL at 09:18

## 2023-05-18 RX ADMIN — AMPICILLIN SODIUM AND SULBACTAM SODIUM SCH MLS/HR: 2; 1 INJECTION, POWDER, FOR SOLUTION INTRAMUSCULAR; INTRAVENOUS at 06:35

## 2023-05-18 RX ADMIN — Medication SCH: at 15:39

## 2023-05-18 RX ADMIN — AMPICILLIN SODIUM AND SULBACTAM SODIUM SCH MLS/HR: 2; 1 INJECTION, POWDER, FOR SOLUTION INTRAMUSCULAR; INTRAVENOUS at 00:17

## 2023-05-18 RX ADMIN — AMPICILLIN SODIUM AND SULBACTAM SODIUM SCH MLS/HR: 2; 1 INJECTION, POWDER, FOR SOLUTION INTRAMUSCULAR; INTRAVENOUS at 13:05

## 2023-05-18 NOTE — P.PN
Subjective


Progress Note Date: 05/18/23





HISTORY OF PRESENT ILLNESS


 


This is a 77-year-old female patient with past medical history of COPD, hyper

tension, hyperlipidemia, bipolar disorder, dementia, left lower extremity DVT on

eliquis, clavicular fracture, history of the myotonic clonic jerks,  She has a 

history of hospitalization due to acute rhabdomyolysis.  Patient is a long-term 

resident at [facility].  Patient was sent into [facility] after the nursing staff at medical [place] found her to have a 

significant swelling and increased hardness of the left side of her face and the

parotid gland that was exquisitely tender to touch, with mental status changes 

and not able to eat or drink anything, Olar Reed is directed to send the 

patient to the ER via EMS patient was seen and evaluated in the emergency 

department and she had a computed tomography scan that showed evidence of 

significance of Lantus without evidence of an actual abscess, patient appeared 

to have a significant parotitis with significant left-sided facial cellulitis.  

Patient was started on IV antibiotic in the form of Unasyn, she was admitted to 

the hospital infectious disease consultation as well as ENT consultation was 

obtained.





5/18: patient has been seen by ENT for left parotid infection acute.  

Recommendations to continue Unasyn and a blood culture, keep head elevated 20 

angle to help reduce swelling.  patient has also been seen by Dr. Madrigal-Aid 

managing antibiotics.consult is in place with neurology for myoclonic jerks. 

Repeat blood work reveals WBC 21.4, hemoglobin 12.1.  Platelet count 324.  

Electrolytes are normal.  BUN 31 creatinine 0.7.  Total bilirubin 1.4, AST 72, 

ALT 60, alkaline phosphatase 105.  Blood cultures are status received.





REVIEW OF SYSTEMS


 Constitutional: No documented fever, no chills, chronic weight loss.  Chronic 

weakness, fatigue or lethargy.  Noted daytime sleepiness.


 HEENT: No headache. No blurred vision or double vision. No nasal drainage or 

congestion. No epistaxis. 


 Lungs: No shortness of breath, cough, no sputum production. No wheezing.


 Cardiovascular: No chest pain, no lower extremity edema.  Reported syncopal 

episode.


 Abdominal: No abdominal pain. No nausea, vomiting. No diarrhea. No 

constipation. No bloody or tarry stools.  Chronic loss of appetite.


 Genitourinary: No dysuria, increased frequency, urgency. No urinary retention.


 Musculoskeletal: No myalgias.  Chronic muscle weakness, chronic gait 

dysfunction, previous falls. No back pain. No neck pain.


 Integumentary: Pre-decub changes in the sacral area.


 Neurologic: no aphasia. No facial droop.  Chronic confusion secondary to 

dementia. No head injury. No headache. No paralysis. No paresthesia.


 Endocrine: No abnormal blood sugars. No weight change. No excessive sweating or

thirst. No cold intolerance.


 





 PHYSICAL EXAMINATION


 Gen: This is a thin cachectic appearing 77-year-old  female.  Patient 

is resting in bed and appears to be comfortable and in no acute distress.


 HEENT: Head is atraumatic left-sided facial sweating with significant 

tenderness of the left parotid area was significant hard like a rock texture. 

Pupils equal, round. Sclerae is anicteric, mucous membranes of the mouth is dry.


 NECK: Supple, there is significant hardness to the left side of the neck and 

left parotid gland


 LUNGS: decreased breath sounds at the bases with few ronchi, no expiratory 

wheezes, no chest wall tenderness


 HEART: First heart sound is depressed, second heart sound is normal there is 

CHRISTOPHER 2/6 located a the left sternal border.


 ABDOMEN: Soft. Bowel sounds are present. No masses. No tenderness.


 EXTREMITIES: No pedal edema. No calf tenderness.DP +1 bilaterally


 NEUROLOGICAL: Patient is awake,alert and oriented to person, chronic confusion 

secondary to underlying dementia.  Generalized weakness to upper and lower 

extremities.  








 ASSESSMENT AND PLAN





1.  Left facial cellulitis involving the submandibular gland as well as left 

parotitis.  No myles abscess as of yet, continue Unasyn, consult with infectious

disease appreciated, blood cultures are status received continue IV fluid resu

scitation the form of normal saline 75 mL an hour, continue Tylenol for pain 

control, ENT evaluation appreciated.  


 


2.  Metabolic encephalopathy secondary to underlying dementia as well as left 

facial cellulitis with parotitis and submandibular gland infection with sepsis 

present on admission.  Blood cultures were obtained, continue IV fluid 

resuscitation the form of normal saline at 75 mL an hour continue IV antibiotic 

in the form of Unasyn 3 g IV piggyback every 6 hours, monitor the patient's 

symptoms very closely.  





3.  History of DVT left lower extremity.  Patient continued on eliquis 5 mg 

twice daily.





4.  Generalized debility with worsening symptoms and difficulty performing her 

own ADLs, requiring more encouragement.  There has been consideration for 

hospice care at the nursing home.





5.  Hypertension.  Patient will be continued on losartan/hydrochlorothiazide 

75278 0.5 mg daily daily.  





6.  Hyperlipidemia.  Patient is not currently on statin.





7.  COPD, stable.  No exacerbation.





8.  Remote history of tobacco use and dependence.  Patient quit smoking in 2021.





9.  Bipolar disorder.  patient is not taking any medication at this point in 

time.  





10.  Severe protein calorie malnutrition, chronic.  Patient resumed on regular 

diet, did not do well with Remeron therefore it will be discontinued.  





11.  Myotonic clonic involuntary movements please consult neurology for further 

recommendation.  





12.  DVT prophylaxis.  Eliquis 5 mg orally twice every day.  





13.  GI prophylaxis.  Continue Protonix 40 mg orally once every day.  





Patient has a public guardian.





She is full code.





Impression and plan of care have been directed as dictated by the signing 

physician.  Patti Escobar nurse practitioner acting as scribe for signing 

physician.





Objective





- Vital Signs


Vital signs: 


                                   Vital Signs











Temp  97.7 F   05/18/23 07:44


 


Pulse  61   05/18/23 07:44


 


Resp  18   05/18/23 07:44


 


BP  110/67   05/18/23 07:44


 


Pulse Ox  100   05/18/23 07:44


 


FiO2      








                                 Intake & Output











 05/17/23 05/18/23 05/18/23





 18:59 06:59 18:59


 


Intake Total  0 


 


Balance  0 


 


Intake:   


 


  Oral  0 


 


Other:   


 


  Voiding Method External Catheter External Catheter External Catheter


 


  # Voids 2 1 1














- Labs


CBC & Chem 7: 


                                 05/17/23 06:41





                                 05/17/23 06:41


Labs: 


                  Abnormal Lab Results - Last 24 Hours (Table)











  05/17/23 05/17/23 Range/Units





  06:41 06:41 


 


WBC  21.45 H   (4.50-10.00)  X 10*3/uL


 


MCHC  31.8 L   (32.0-37.0)  g/dL


 


RDW  15.9 H   (11.5-14.5)  %


 


Immature Gran #  0.15 H   (0.00-0.04)  X 10*3/uL


 


Neutrophils #  17.39 H   (1.80-7.70)  X 10*3/uL


 


Monocytes #  1.28 H   (0.20-1.00)  X 10*3/uL


 


BUN   31.2 H  (9.0-27.0)  mg/dL


 


BUN/Creatinine Ratio   44.57 H  (12.00-20.00)  Ratio


 


Total Bilirubin   1.40 H  (0.30-1.20)  mg/dL


 


AST   72 H  (13-35)  U/L


 


ALT   60 H  (8-44)  U/L


 


Total Protein   5.5 L  (6.2-8.2)  g/dL


 


Albumin   2.9 L  (3.8-4.9)  g/dL


 


Albumin/Globulin Ratio   1.12 L  (1.60-3.17)  g/dL

## 2023-05-18 NOTE — P.PN
Subjective


Progress Note Date: 05/18/23


Principal diagnosis: 





Left-sided parotitis


Patient is a 77-year-old  female with a past medical his significant 

for COPD hypertension hyperlipidemia bipolar disorder and dementia resident of 

[facility] on patient  was noticed to have a significant swelling 

and hardness on the left side of the face for which the patient has been sent to

the ER, patient did have a CT suggestive significant swelling inflammation of 

the left parotid and some mandibular gland


 on today's evaluation that is 05/18/2023, the patient remains to be afebrile, 

the patient is sleepy and not a very good historian no vomiting diarrhea or any 

other changes has been reported by the nursing staff








Objective





- Vital Signs


Vital signs: 


                                   Vital Signs











Temp  97.7 F   05/18/23 07:44


 


Pulse  61   05/18/23 07:44


 


Resp  18   05/18/23 07:44


 


BP  110/67   05/18/23 07:44


 


Pulse Ox  100   05/18/23 07:44


 


FiO2      








                                 Intake & Output











 05/17/23 05/18/23 05/18/23





 18:59 06:59 18:59


 


Intake Total  0 


 


Balance  0 


 


Weight   72.575 kg


 


Intake:   


 


  Oral  0 


 


Other:   


 


  Voiding Method External Catheter External Catheter External Catheter


 


  # Voids 2 1 1














- Exam


GENERAL DESCRIPTION: An elderly female lying in bed in no distress





HEENT: Left Parotid Area did have swelling and tenderness slightly decreased





RESPIRATORY SYSTEM: Unlabored breathing , decreased breath sounds at bases





HEART: S1 S2 regular rate and rhythm ,





ABDOMEN: Soft , no tenderness





EXTREMITIES: No edema feet








- Labs


CBC & Chem 7: 


                                 05/18/23 06:34





                                 05/18/23 06:34


Labs: 


                  Abnormal Lab Results - Last 24 Hours (Table)











  05/18/23 05/18/23 Range/Units





  06:34 06:34 


 


WBC  14.33 H   (4.50-10.00)  X 10*3/uL


 


RBC  3.95 L   (4.10-5.20)  X 10*6/uL


 


Hgb  10.7 L   (12.0-15.0)  g/dL


 


Hct  34.3 L   (37.2-46.3)  %


 


MCHC  31.2 L   (32.0-37.0)  g/dL


 


RDW  15.6 H   (11.5-14.5)  %


 


Immature Gran #  0.08 H   (0.00-0.04)  X 10*3/uL


 


Neutrophils #  10.38 H   (1.80-7.70)  X 10*3/uL


 


Potassium   3.3 L  (3.5-5.5)  mmol/L


 


Anion Gap   9.80 L  (10.00-18.00)  mmol/L


 


Creatinine   0.5 L  (0.6-1.5)  mg/dL


 


BUN/Creatinine Ratio   37.60 H  (12.00-20.00)  Ratio


 


Calcium   8.3 L  (8.7-10.3)  mg/dL


 


AST   60 H  (13-35)  U/L


 


ALT   53 H  (8-44)  U/L


 


Total Protein   4.9 L  (6.2-8.2)  g/dL


 


Albumin   2.6 L  (3.8-4.9)  g/dL


 


Albumin/Globulin Ratio   1.13 L  (1.60-3.17)  g/dL








                      Microbiology - Last 24 Hours (Table)











 05/16/23 13:15 Blood Culture - Preliminary





 Blood 


 


 05/16/23 13:00 Blood Culture - Preliminary





 Blood 














Assessment and Plan


(1) Acute parotitis


Current Visit: Yes   Status: Acute   Code(s): K11.21 - ACUTE SIALOADENITIS   

SNOMED Code(s): 85856438


   





(2) Cellulitis, neck


Current Visit: Yes   Status: Acute   Code(s): L03.221 - CELLULITIS OF NECK   

SNOMED Code(s): 42182007


   


Plan: 


1patient presented to hospital with significant swelling to the left side of 

the face which is tender to touch and warm concerning for left-sided parotitis 

and involvement of the left submandibular gland could be related to the oral bebe

ra and less likely MRSA infection


2-patient seemed to have shown some clinical improvement and the white count is 

trending down, patient will be continued on Unasyn 3 g every 6 hours and monitor

clinical course closely





Time with Patient: Less than 30

## 2023-05-18 NOTE — P.CNNES
History of Present Illness


Consult date: 23


Requesting physician: Florence Sullivan


Reason for Consult: Myotonic clonic jerks


History of Present Illness: 





Patient is a 77-year-old female came to the hospital by ambulance 2 days ago on 

2023 at 12:18 AM for possible cellulitis left facial region.  Patient has 

dementia, not able to provide any history.  As per EMS flow sheet, when they 

arrived, patient was sitting in her wheelchair at the dining table in the dining

room of [facility] in the care of the staff.  Staff mentioned that 

they noticed at mealtime a new onset of redness and swelling to the patient's 

left side of the neck and face.  Patient was alert and oriented 1.  Her jaw was

clenched during speech which is normal for the patient per staff.  She was found

to have redness and swelling to the left side of her neck and the area surround

ing her left ear.  The area is painful to touch.  Patient's vitals at the scene 

was blood pressure 116/52, pulse rate 54, respirations 16, saturation 100%.





Neurology was consulted for myoclonic type tremors.   I spoke to patient's 

nurse, who mentioned that last night patient had some shaky movement, like 

jerking little bit.  This prompted neurology consultation.  No previous history 

of seizure.





Patient's T-max has been 99.1.  Blood test shows WBC 24.9, which came down to 

21.45 this morning.  Hemoglobin and platelets are normal.  PT/PTT normal.  

Electrolytes normal, BUN 38, creatinine 0.71.  Lactate 2.7, AST 76, ALT 53.  EKG

shows sinus rhythm.  CT of the soft tissue of the neck with contrast revealed 

increased signal throughout the left neck subcutaneous and deeper extending to 

adjacent to the submandibular gland and parotid gland.  Findings could be 

compatible with infection.  Underlying abscess however is not identified at this

time.  Patient has been seen by ENT, diagnosed with acute parotitis, cellulitis 

of the neck.  No abscess seen, no surgery Recommended.





On review of records, patient was seen by Dr. Watson on 2023 for episode of

unresponsiveness, which was felt to be syncopal spell.  Patient had an EEG 

performed at that time, which revealed moderate encephalopathy.  Patient had UTI

at that time.  She has history of a prior syncopal spell in 2022 as 

well also related to UTI.  Patient is currently on Eliquis for unclear reason.











Review of Systems


Constitutional: Denies chills, Denies fever


Eyes: denies blurred vision, denies diplopia, denies pain


Ears: deny: decreased hearing, ear discharge, earache


Ears, nose, mouth and throat: Reports neck fullness/pressure, Reports neck lump,

Denies dysphagia, Denies headache, Denies sore throat


Cardiovascular: Denies chest pain, Denies shortness of breath


Respiratory: Denies cough, Denies excessive sputum


Gastrointestinal: Denies abdominal pain, Denies diarrhea, Denies nausea, Denies 

vomiting


Musculoskeletal: Reports gait dysfunction, Denies myalgias


Integumentary: Reports rash, Denies pruritus


Neurological: Reports as per HPI


Psychiatric: Reports memory loss, Denies anxiety, Denies depression


Endocrine: Denies fatigue, Denies weight change





Past Medical History


Past Medical History: Cancer, COPD, Dementia, GERD/Reflux, Hyperlipidemia, 

Hypertension, Osteoarthritis (OA), Renal Disease, Syncope


Additional Past Medical History / Comment(s): SYNCOPAL EPISODES RECENTLY, 

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

WITH REMOVAL.


History of Any Multi-Drug Resistant Organisms: None Reported


Past Surgical History: Hysterectomy, Tonsillectomy


Additional Past Surgical History / Comment(s): SKIN CANCER REMOVALS, 

COLONOSCOPY.


Past Anesthesia/Blood Transfusion Reactions: No Reported Reaction


Past Psychological History: Bipolar


Additional Psychological History / Comment(s): Patient is resident at [facility]


Smoking Status: Former smoker


Past Alcohol Use History: None Reported


Past Drug Use History: None Reported





- Past Family History


  ** Mother


Additional Family Medical History / Comment(s): MOTHER HAD A "BAD HEART."





  ** Father


Additional Family Medical History / Comment(s): FATHER  IN A PLANE CRASH 

EARLIER IN LIFE.





Medications and Allergies


                                Home Medications











 Medication  Instructions  Recorded  Confirmed  Type


 


Acetaminophen [Acetaminophen ER] 650 mg PO Q6H PRN 22 History


 


Magnesium Hydroxide [Milk of 2,400 mg PO Q72H PRN 22 History





Magnesia]    


 


bisacodyL 10 mg RECTAL DAILY PRN 22 History


 


Apixaban [Eliquis] 5 mg PO BID 22 History


 


Cholecalciferol [Vitamin D3 (25 25 mcg PO DAILY@1600 22 History





Mcg = 1000 Iu)]    


 


Acetaminophen [Tylenol] 650 mg PO Q6H PRN 23 History


 


Healthshake 1 can PO TID-W/MEALS 23 History


 


Losartan-Hctz 50-12.5 mg [Hyzaar 1 tab PO DAILY 23 History





50-12.5]    


 


Prostat  30 ml PO DAILY@0800 23 History


 


Mirtazapine [Remeron Soluspan] 30 mg PO HS 23 History


 


Sennosides [Senokot] 8.6 mg PO DAILY 23 History








                                    Allergies











Allergy/AdvReac Type Severity Reaction Status Date / Time


 


No Known Allergies Allergy   Verified 23 14:09














Physical Examination





- Vital Signs


Vital Signs: 


                                   Vital Signs











  Temp Pulse Resp BP Pulse Ox


 


 23 07:44  97.7 F  61  18  110/67  100


 


 23 03:43  97.4 F L  63  18  105/63  99


 


 23 20:00    18  


 


 23 19:38  97.6 F  66  18  113/71  98


 


 23 13:15  99.1 F  65  18  119/66  100








                                Intake and Output











 23





 22:59 06:59 14:59


 


Intake Total  0 


 


Balance  0 


 


Intake:   


 


  Oral  0 


 


Other:   


 


  Voiding Method External Catheter  


 


  # Voids 2 1 














Patient is an elderly  female, who is laying comfortably in bed, 

somewhat sleepy.


Patient is sleepy, keeps her eyes closed, but does wake up.  Patient has 

significant cognitive impairment, therefore examination very limited.  No 

obvious seizure-like activity noted.  Patient is oriented 1-2.  She knows her 

name, could not tell me her age.  She knows that she lives in [facility].  She could not tell the current month or the year.  Her speech is very

 limited, often says "2" or "[place]" for everything.  She is grinding her teeth, 

which is baseline.  She is able to name objects presented like pen and glasses 

and ear.  She can repeat.    Her speech is slurred, as she clenches on her teeth

 while talking.  Attention, concentration and fund of knowledge is very limited.

 





On cranial nerve examination, pupils are equal, round and reacting to light, 

visual fields are full on confrontation, with no neglect on double simultaneous 

depression.  Extraocular muscles are intact with no nystagmus.  Patient has mild

 left facial weakness, involving the lower facial region.  Her tongue protrudes 

to the midline.  Palatal elevation and sensation cannot be assessed, hearing is 

slightly decreased and shoulder shrug normal, facial sensation normal.  





On muscle strength testing,  patient did not cooperate well with the 

examination.  Patient able to keep her arms up off the bed and slowly brings it 

down.  She was squeezing slightly better in the right side as compared to the 

left, particularly because of possible IV line in the left dorsal wrist.  No 

obvious focal weakness noted.  Patient able to wiggle her feet and withdraw to 

painful stimuli.  This is her baseline, as similar examination was noted by Dr. Watson few months ago.  





Deep tendon reflexes are symmetric  biceps 2, brachioradialis 1, knees 1, absent

 ankles and plantars are downgoing bilaterally.





Sensory to touch cannot be assessed, but patient withdraws to painful stimuli 

equally.





Cerebellar function patient did not cooperate.  No obvious ataxia with gross 

movements.  Patient did not cooperate with testing of the lower extremities.    

Tone is overall slightly decreased and bulk of muscles also slightly decreased.





Gait  patient not ambulatory.  





On general examination, there is no carotid bruit or murmur, S1-S2 audible.  

Chest is clear on consultation.  Abdomen is soft nontender.  No organomegaly, 

bowel sounds present.   Peripheral pulses are present.  No edema. patient has 

prominent swelling of the left parotid region.  Patient has bruise over the 

right knee.





Results





- Laboratory Findings


CBC and BMP: 


                                 23 06:34





                                 23 06:34


Abnormal Lab Findings: 


                                  Abnormal Labs











  23





  13:05 13:05 13:05


 


WBC  24.9 H  


 


MCHC   


 


RDW   


 


Immature Gran #   


 


Neutrophils #  21.5 H  


 


Monocytes #   


 


APTT   32.5 H 


 


BUN    38 H


 


BUN/Creatinine Ratio   


 


Glucose    129 H


 


Plasma Lactic Acid Christian   


 


Total Bilirubin    2.1 H


 


AST    76 H


 


ALT    53 H


 


Total Protein   


 


Albumin    3.3 L


 


Albumin/Globulin Ratio   














  2323





  13:05 06:41 06:41


 


WBC   21.45 H 


 


MCHC   31.8 L 


 


RDW   15.9 H 


 


Immature Gran #   0.15 H 


 


Neutrophils #   17.39 H 


 


Monocytes #   1.28 H 


 


APTT   


 


BUN    31.2 H


 


BUN/Creatinine Ratio    44.57 H


 


Glucose   


 


Plasma Lactic Acid Chirstian  2.7 H*  


 


Total Bilirubin    1.40 H


 


AST    72 H


 


ALT    60 H


 


Total Protein    5.5 L


 


Albumin    2.9 L


 


Albumin/Globulin Ratio    1.12 L














Assessment and Plan


Assessment: 





* Altered mental status, likely due to toxic metabolic encephalopathy due to 

  reasons mentioned below.


* Acute parotitis


* Cellulitis left side of the neck


* History of syncope in 2022 and 2023


* Dementia


* Hypertension


* Hyperlipidemia


* Essential tremor











Plan: 





* Patient probably had metabolic tremor from metabolic encephalopathy.


* Patient is on Unasyn for parotitis and cellulitis.


* Neurologically, no other workup indicated at this time.  Patient had an EEG 

  performed 2023 which revealed background slowing of moderate degree.  No

   epileptiform activity was seen.  No need to repeat EEG.


* Neurology will follow clinically.  Thank you for the consult.

## 2023-05-19 PROCEDURE — 02HV33Z INSERTION OF INFUSION DEVICE INTO SUPERIOR VENA CAVA, PERCUTANEOUS APPROACH: ICD-10-PCS

## 2023-05-19 RX ADMIN — AMPICILLIN SODIUM AND SULBACTAM SODIUM SCH MLS/HR: 2; 1 INJECTION, POWDER, FOR SOLUTION INTRAMUSCULAR; INTRAVENOUS at 14:00

## 2023-05-19 RX ADMIN — LOSARTAN POTASSIUM AND HYDROCHLOROTHIAZIDE SCH EACH: 12.5; 5 TABLET ORAL at 09:27

## 2023-05-19 RX ADMIN — CEFAZOLIN SCH: 330 INJECTION, POWDER, FOR SOLUTION INTRAMUSCULAR; INTRAVENOUS at 14:06

## 2023-05-19 RX ADMIN — AMPICILLIN SODIUM AND SULBACTAM SODIUM SCH MLS/HR: 2; 1 INJECTION, POWDER, FOR SOLUTION INTRAMUSCULAR; INTRAVENOUS at 00:11

## 2023-05-19 RX ADMIN — AMPICILLIN SODIUM AND SULBACTAM SODIUM SCH MLS/HR: 2; 1 INJECTION, POWDER, FOR SOLUTION INTRAMUSCULAR; INTRAVENOUS at 23:37

## 2023-05-19 RX ADMIN — SENNOSIDES SCH MG: 8.6 TABLET, FILM COATED ORAL at 09:27

## 2023-05-19 RX ADMIN — AMPICILLIN SODIUM AND SULBACTAM SODIUM SCH MLS/HR: 2; 1 INJECTION, POWDER, FOR SOLUTION INTRAMUSCULAR; INTRAVENOUS at 06:06

## 2023-05-19 RX ADMIN — APIXABAN SCH MG: 5 TABLET, FILM COATED ORAL at 21:47

## 2023-05-19 RX ADMIN — AMPICILLIN SODIUM AND SULBACTAM SODIUM SCH MLS/HR: 2; 1 INJECTION, POWDER, FOR SOLUTION INTRAMUSCULAR; INTRAVENOUS at 18:13

## 2023-05-19 RX ADMIN — Medication SCH MCG: at 16:36

## 2023-05-19 RX ADMIN — APIXABAN SCH MG: 5 TABLET, FILM COATED ORAL at 09:27

## 2023-05-19 RX ADMIN — CEFAZOLIN SCH MLS/HR: 330 INJECTION, POWDER, FOR SOLUTION INTRAMUSCULAR; INTRAVENOUS at 04:22

## 2023-05-19 NOTE — P.PN
Subjective


Progress Note Date: 05/19/23





HISTORY OF PRESENT ILLNESS


 


This is a 77-year-old female patient with past medical history of COPD, hyper

tension, hyperlipidemia, bipolar disorder, dementia, left lower extremity DVT on

eliquis, clavicular fracture, history of the myotonic clonic jerks,  She has a 

history of hospitalization due to acute rhabdomyolysis.  Patient is a long-term 

resident at [facility].  Patient was sent into [facility] after the nursing staff at [facility] found her to have a 

significant swelling and increased hardness of the left side of her face and the

parotid gland that was exquisitely tender to touch, with mental status changes 

and not able to eat or drink anything, [place] [facility] is directed to send the 

patient to the ER via EMS patient was seen and evaluated in the emergency 

department and she had a computed tomography scan that showed evidence of 

significance of Lantus without evidence of an actual abscess, patient appeared 

to have a significant parotitis with significant left-sided facial cellulitis.  

Patient was started on IV antibiotic in the form of Unasyn, she was admitted to 

the hospital infectious disease consultation as well as ENT consultation was 

obtained.





5/18: patient has been seen by ENT for left parotid infection acute.  

Recommendations to continue Unasyn and a blood culture, keep head elevated 20 

angle to help reduce swelling.  patient has also been seen by Dr. Madrigal-Aid 

managing antibiotics.consult is in place with neurology for myoclonic jerks. 

Repeat blood work reveals WBC 21.4, hemoglobin 12.1.  Platelet count 324.  

Electrolytes are normal.  BUN 31 creatinine 0.7.  Total bilirubin 1.4, AST 72, 

ALT 60, alkaline phosphatase 105.  Blood cultures are status received.





5/19: Patient remains afebrile, heart rate in the 50s, blood pressure 01 /24/1973, pulse ox 97% on room air.  Blood culture no growth at 48 hours 2 

specimens.  Blood work from yesterday revealed improving leukocytosis of 14.3, 

hemoglobin 7.7.  Potassium 3.3.  BUN 18 and creatinine 0.5.  AST 60 and ALT 53. 

Potassium will be replaced today.





REVIEW OF SYSTEMS


 Constitutional: No documented fever, no chills, chronic weight loss.  Chronic 

weakness, fatigue or lethargy.  Noted daytime sleepiness.


 HEENT: No headache. No blurred vision or double vision. No nasal drainage or 

congestion. No epistaxis. 


 Lungs: No shortness of breath, cough, no sputum production. No wheezing.


 Cardiovascular: No chest pain, no lower extremity edema.  Reported syncopal 

episode.


 Abdominal: No abdominal pain. No nausea, vomiting. No diarrhea. No 

constipation. No bloody or tarry stools.  Chronic loss of appetite.


 Genitourinary: No dysuria, increased frequency, urgency. No urinary retention.


 Musculoskeletal: No myalgias.  Chronic muscle weakness, chronic gait 

dysfunction, previous falls. No back pain. No neck pain.


 Integumentary: Pre-decub changes in the sacral area.


 Neurologic: no aphasia. No facial droop.  Chronic confusion secondary to 

dementia. No head injury. No headache. No paralysis. No paresthesia.


 Endocrine: No abnormal blood sugars. No weight change. No excessive sweating or

thirst. No cold intolerance.


 





 PHYSICAL EXAMINATION


 Gen: This is a thin cachectic appearing 77-year-old  female.  Patient 

is resting in bed and appears to be comfortable and in no acute distress.


 HEENT: Head is atraumatic left-sided facial sweating with significant tendernes

s of the left parotid area was significant hard like a rock texture. Pupils 

equal, round. Sclerae is anicteric, mucous membranes of the mouth is dry.


 NECK: Supple, there is significant hardness to the left side of the neck and 

left parotid gland


 LUNGS: decreased breath sounds at the bases with few ronchi, no expiratory 

wheezes, no chest wall tenderness


 HEART: First heart sound is depressed, second heart sound is normal there is 

CHRISTOPHER 2/6 located a the left sternal border.


 ABDOMEN: Soft. Bowel sounds are present. No masses. No tenderness.


 EXTREMITIES: No pedal edema. No calf tenderness.DP +1 bilaterally


 NEUROLOGICAL: Patient is awake,alert and oriented to person, chronic confusion 

secondary to underlying dementia.  Generalized weakness to upper and lower 

extremities.  








 ASSESSMENT AND PLAN





1.  Left facial cellulitis involving the submandibular gland as well as left 

parotitis.  No myles abscess as of yet, continue Unasyn, consult with infectious

disease appreciated, blood cultures are revealing no growth at 48 hours, 

continue IV fluid resuscitation the form of normal saline 75 mL an hour, 

continue Tylenol for pain control, ENT evaluation appreciated.  


 


2.  Metabolic encephalopathy secondary to underlying dementia as well as left 

facial cellulitis with parotitis and submandibular gland infection with sepsis 

present on admission.  Blood cultures were obtained, continue IV fluid 

resuscitation the form of normal saline at 75 mL an hour continue IV antibiotic 

in the form of Unasyn 3 g IV piggyback every 6 hours, monitor the patient's 

symptoms very closely.  





3.  History of DVT left lower extremity.  Patient continued on eliquis 5 mg 

twice daily.





4.  Generalized debility with worsening symptoms and difficulty performing her 

own ADLs, requiring more encouragement.  There has been consideration for 

hospice care at the nursing home.





5.  Hypertension.  Patient will be continued on losartan/hydrochlorothiazide 

11880 0.5 mg daily daily.  





6.  Hyperlipidemia.  Patient is not currently on statin.





7.  COPD, stable.  No exacerbation.





8.  Remote history of tobacco use and dependence.  Patient quit smoking in 2021.





9.  Bipolar disorder.  patient is not taking any medication at this point in 

time.  





10.  Severe protein calorie malnutrition, chronic.  Patient resumed on regular 

diet, did not do well with Remeron therefore it will be discontinued.  





11.  Myotonic clonic involuntary movements please consult neurology for further 

recommendation.  





12.  DVT prophylaxis.  Eliquis 5 mg orally twice every day.  





13.  GI prophylaxis.  Continue Protonix 40 mg orally once every day.  





Patient has a public guardian.





She is full code.





Discharge plan: Return to [facility] on Monday





Impression and plan of care have been directed as dictated by the signing 

physician.  Patti Escobar nurse practitioner acting as scribe for signing 

physician.





Objective





- Vital Signs


Vital signs: 


                                   Vital Signs











Temp  97.6 F   05/19/23 07:25


 


Pulse  57 L  05/19/23 07:25


 


Resp  15   05/19/23 07:25


 


BP  124/71   05/19/23 07:25


 


Pulse Ox  97   05/19/23 07:25


 


FiO2      








                                 Intake & Output











 05/18/23 05/19/23 05/19/23





 18:59 06:59 18:59


 


Intake Total  1100 


 


Balance  1100 


 


Weight 72.575 kg  


 


Intake:   


 


  Intake, IV Titration  1100 





  Amount   


 


    Ampicillin-Sulbactam 3 gm  100 





    In Sodium Chloride 0.9%   





    100 ml @ 200 mls/hr IVPB   





    Q6HR [facility] Rx#:[id number]   


 


    Sodium Chloride 0.9% 1,  1000 





    000 ml @ 75 mls/hr IV .   





    [id number] [facility] Rx#:[id number]   


 


Other:   


 


  Voiding Method External Catheter Diaper Diaper


 


  # Voids 1  


 


  # Bowel Movements 1  














- Labs


CBC & Chem 7: 


                                 05/18/23 06:34





                                 05/18/23 06:34


Labs: 


                      Microbiology - Last 24 Hours (Table)











 05/16/23 13:15 Blood Culture - Preliminary





 Blood 


 


 05/16/23 13:00 Blood Culture - Preliminary





 Blood

## 2023-05-19 NOTE — P.PN
Subjective


Progress Note Date: 05/19/23


Principal diagnosis: 





Left-sided parotitis


Patient is a 77-year-old  female with a past medical his significant 

for COPD hypertension hyperlipidemia bipolar disorder and dementia resident of 

[facility] patient  was noticed to have a significant swelling 

and hardness on the left side of the face for which the patient has been sent to

the ER, patient did have a CT suggestive significant swelling inflammation of 

the left parotid and some mandibular gland


 on today's evaluation that is 05/19/2023, the patient continues to be afebrile,

the patient is more awake and alert today and did answer question patient did 

mention feeling better as for left side of the face is concerned the chest pain 

shortness of breath or cough no abdominal





Area





Objective





- Vital Signs


Vital signs: 


                                   Vital Signs











Temp  97.6 F   05/19/23 07:25


 


Pulse  57 L  05/19/23 07:25


 


Resp  15   05/19/23 07:25


 


BP  124/71   05/19/23 07:25


 


Pulse Ox  97   05/19/23 07:25


 


FiO2      








                                 Intake & Output











 05/18/23 05/19/23 05/19/23





 18:59 06:59 18:59


 


Intake Total  1100 


 


Balance  1100 


 


Weight 72.575 kg  


 


Intake:   


 


  Intake, IV Titration  1100 





  Amount   


 


    Ampicillin-Sulbactam 3 gm  100 





    In Sodium Chloride 0.9%   





    100 ml @ 200 mls/hr IVPB   





    Q6HR CHANTE Rx#:[id number]   


 


    Sodium Chloride 0.9% 1,  1000 





    000 ml @ 75 mls/hr IV .   





    [id number] CHANTE Rx#:[id number]   


 


Other:   


 


  Voiding Method External Catheter Diaper Diaper


 


  # Voids 1  


 


  # Bowel Movements 1  














- Exam


GENERAL DESCRIPTION: An elderly female lying in bed in no distress





HEENT: Left Parotid Area swelling redness and tenderness has decreased





RESPIRATORY SYSTEM: Unlabored breathing , decreased breath sounds at bases





HEART: S1 S2 regular rate and rhythm ,





ABDOMEN: Soft , no tenderness





EXTREMITIES: No edema feet








- Labs


CBC & Chem 7: 


                                 05/18/23 06:34





                                 05/18/23 06:34


Labs: 


                      Microbiology - Last 24 Hours (Table)











 05/16/23 13:15 Blood Culture - Preliminary





 Blood 


 


 05/16/23 13:00 Blood Culture - Preliminary





 Blood 














Assessment and Plan


(1) Acute parotitis


Current Visit: Yes   Status: Acute   Code(s): K11.21 - ACUTE SIALOADENITIS   

SNOMED Code(s): 60649377


   





(2) Cellulitis, neck


Current Visit: Yes   Status: Acute   Code(s): L03.221 - CELLULITIS OF NECK   

SNOMED Code(s): 85641174


   


Plan: 


1patient presented to hospital with significant swelling to the left side of 

the face which is tender to touch and warm concerning for left-sided parotitis 

and involvement of the left submandibular gland could be related to the oral 

javed and less likely MRSA infection


2-patient seemed to have shown some clinical improvement and the white count is 

trending down was down to 14,000 yesterday no CBC done today, patient will be 

continued on Unasyn 3 g every 6 hours and hopefully finishing therapy with oral 

Augmentin


Time with Patient: Less than 30

## 2023-05-20 LAB
ALBUMIN SERPL-MCNC: 2.4 G/DL (ref 3.8–4.9)
ALBUMIN/GLOB SERPL: 1.09 G/DL (ref 1.6–3.17)
ALP SERPL-CCNC: 77 U/L (ref 41–126)
ALT SERPL-CCNC: 29 U/L (ref 8–44)
ANION GAP SERPL CALC-SCNC: 10.9 MMOL/L (ref 10–18)
AST SERPL-CCNC: 26 U/L (ref 13–35)
BUN SERPL-SCNC: 12.8 MG/DL (ref 9–27)
BUN/CREAT SERPL: 25.6 RATIO (ref 12–20)
CALCIUM SPEC-MCNC: 7.8 MG/DL (ref 8.7–10.3)
CHLORIDE SERPL-SCNC: 111 MMOL/L (ref 96–109)
CO2 SERPL-SCNC: 22.1 MMOL/L (ref 20–27.5)
ERYTHROCYTE [DISTWIDTH] IN BLOOD BY AUTOMATED COUNT: 3.87 X 10*6/UL (ref 4.1–5.2)
ERYTHROCYTE [DISTWIDTH] IN BLOOD: 15.7 % (ref 11.5–14.5)
GLOBULIN SER CALC-MCNC: 2.2 G/DL (ref 1.6–3.3)
GLUCOSE SERPL-MCNC: 78 MG/DL (ref 70–110)
HCT VFR BLD AUTO: 33.2 % (ref 37.2–46.3)
HGB BLD-MCNC: 10.5 G/DL (ref 12–15)
MCH RBC QN AUTO: 27.1 PG (ref 27–32)
MCHC RBC AUTO-ENTMCNC: 31.6 G/DL (ref 32–37)
MCV RBC AUTO: 85.8 FL (ref 80–97)
NRBC BLD AUTO-RTO: 0 /100 WBCS (ref 0–0)
PLATELET # BLD AUTO: 341 X 10*3/UL (ref 140–440)
POTASSIUM SERPL-SCNC: 3.2 MMOL/L (ref 3.5–5.5)
PROT SERPL-MCNC: 4.6 G/DL (ref 6.2–8.2)
SODIUM SERPL-SCNC: 144 MMOL/L (ref 135–145)
WBC # BLD AUTO: 7.5 X 10*3/UL (ref 4.5–10)

## 2023-05-20 RX ADMIN — CEFAZOLIN SCH MLS/HR: 330 INJECTION, POWDER, FOR SOLUTION INTRAMUSCULAR; INTRAVENOUS at 01:24

## 2023-05-20 RX ADMIN — SENNOSIDES SCH MG: 8.6 TABLET, FILM COATED ORAL at 09:29

## 2023-05-20 RX ADMIN — POTASSIUM CHLORIDE SCH MEQ: 20 TABLET, EXTENDED RELEASE ORAL at 11:27

## 2023-05-20 RX ADMIN — AMPICILLIN SODIUM AND SULBACTAM SODIUM SCH MLS/HR: 2; 1 INJECTION, POWDER, FOR SOLUTION INTRAMUSCULAR; INTRAVENOUS at 05:49

## 2023-05-20 RX ADMIN — POTASSIUM CHLORIDE SCH MEQ: 20 TABLET, EXTENDED RELEASE ORAL at 13:22

## 2023-05-20 RX ADMIN — AMPICILLIN SODIUM AND SULBACTAM SODIUM SCH MLS/HR: 2; 1 INJECTION, POWDER, FOR SOLUTION INTRAMUSCULAR; INTRAVENOUS at 23:32

## 2023-05-20 RX ADMIN — APIXABAN SCH MG: 5 TABLET, FILM COATED ORAL at 09:29

## 2023-05-20 RX ADMIN — Medication SCH MCG: at 17:18

## 2023-05-20 RX ADMIN — APIXABAN SCH MG: 5 TABLET, FILM COATED ORAL at 21:04

## 2023-05-20 RX ADMIN — AMPICILLIN SODIUM AND SULBACTAM SODIUM SCH MLS/HR: 2; 1 INJECTION, POWDER, FOR SOLUTION INTRAMUSCULAR; INTRAVENOUS at 11:27

## 2023-05-20 RX ADMIN — CEFAZOLIN SCH MLS/HR: 330 INJECTION, POWDER, FOR SOLUTION INTRAMUSCULAR; INTRAVENOUS at 16:03

## 2023-05-20 RX ADMIN — AMPICILLIN SODIUM AND SULBACTAM SODIUM SCH MLS/HR: 2; 1 INJECTION, POWDER, FOR SOLUTION INTRAMUSCULAR; INTRAVENOUS at 17:19

## 2023-05-20 RX ADMIN — MIRTAZAPINE SCH MG: 15 TABLET, FILM COATED ORAL at 21:04

## 2023-05-20 RX ADMIN — LOSARTAN POTASSIUM AND HYDROCHLOROTHIAZIDE SCH EACH: 12.5; 5 TABLET ORAL at 09:29

## 2023-05-20 NOTE — XR
EXAMINATION TYPE: XR chest 1V portable

 

DATE OF EXAM: 5/20/2023

 

CLINICAL HISTORY: Difficulty breathing progress study.  

 

TECHNIQUE: Single AP portable frontal view of the chest is obtained.

 

COMPARISON: Chest x-ray from April 17, 2023

 

FINDINGS:  Lungs remain grossly clear. Cardiac silhouette size is stable and within normal limits. Os
seous structures are intact.

 

IMPRESSION: No acute process.

## 2023-05-20 NOTE — P.PN
Subjective


Progress Note Date: 05/19/23





Patient was seen for a follow-up.  Patient is laying comfortably in the bed.  

She slightly more awake.  Patient denies any headache, no dizziness, no pain 

anywhere.  Denies any throat pain.  No complains of tremors.





Objective





- Vital Signs


Vital signs: 


                                   Vital Signs











Temp  97.6 F   05/19/23 07:25


 


Pulse  57 L  05/19/23 07:25


 


Resp  15   05/19/23 07:25


 


BP  124/71   05/19/23 07:25


 


Pulse Ox  97   05/19/23 07:25


 


FiO2      








                                 Intake & Output











 05/18/23 05/19/23 05/19/23





 18:59 06:59 18:59


 


Intake Total  1100 


 


Balance  1100 


 


Weight 72.575 kg  


 


Intake:   


 


  Intake, IV Titration  1100 





  Amount   


 


    Ampicillin-Sulbactam 3 gm  100 





    In Sodium Chloride 0.9%   





    100 ml @ 200 mls/hr IVPB   





    Q6HR CHANTE Rx#:[id number]   


 


    Sodium Chloride 0.9% 1,  1000 





    000 ml @ 75 mls/hr IV .   





    [id number] CHANTE Rx#:[id number]   


 


Other:   


 


  Voiding Method External Catheter Diaper Diaper


 


  # Voids 1  


 


  # Bowel Movements 1  














- Exam





Examination appears slightly more alert, responsive, speech is slightly more 

clear.  Patient does have dementia.  Detailed testing deferred.





- Labs


CBC & Chem 7: 


                                 05/20/23 05:31





                                 05/20/23 05:31


Labs: 


                      Microbiology - Last 24 Hours (Table)











 05/16/23 13:15 Blood Culture - Preliminary





 Blood 


 


 05/16/23 13:00 Blood Culture - Preliminary





 Blood 














Assessment and Plan


Assessment: 





* Altered mental status, likely due to toxic metabolic encephalopathy due to 

  reasons mentioned below.


* Acute parotitis


* Cellulitis left side of the neck


* History of syncope in November 2022 and February 2023


* Dementia


* Hypertension


* Hyperlipidemia


* Essential tremor











Plan: 





* Patient probably had metabolic tremor from metabolic encephalopathy.


* Patient is on Unasyn for parotitis and cellulitis.


* Neurologically, no other workup indicated at this time.  Patient had an EEG 

  performed 02/27/2023 which revealed background slowing of moderate degree.  No

  epileptiform activity was seen.  No need to repeat EEG.


* Neurology will sign off.  Please reconsult neurology if any other concerns.

## 2023-05-20 NOTE — PN
PROGRESS NOTE



DATE OF SERVICE:  05/20/2023



SUBJECTIVE:

This is a 77-year-old woman with a past medical history of multiple medical problems

including hypertension, who is a resident of [facility].  She was admitted with

left-sided neck infection, possibly parotitis and cellulitis.  The patient is on IV

antibiotics.  The patient is closely monitored.  No chest pain.  No palpitations.  No

fever.  The patient is confused.



OBJECTIVE:

VITAL SIGNS:  Pulse is 53, blood pressure 130/70, respirations 14.

CHEST:  Clear to auscultation.

CARDIOVASCULAR:  S1 and S2.

ABDOMEN:  Soft.

NECK:  Left neck is swollen.  Minimal tenderness.  Not much erythema.



LABORATORY DATA:

Reviewed.  WBC is 7.5.



ASSESSMENT:

1. Acute left parotitis and cellulitis.

2. Dementia.

3. Elevated white blood count, present on admission.

4. Gastroesophageal reflux disease.

5. Hypertension.

6. Hyperlipidemia.

7. Multiple medical issues.



RECOMMENDATIONS AND DISCUSSION:

This is a 77-year-old woman, who presented with multiple complex medical issues.  We

will monitor the patient closely.  I would recommend to continue the current

medications.  The cultures are negative so far.  The patient is on Unasyn.  Closely

follow with Infectious Disease.  Prognosis is guarded.  Further recommendations to

follow.





MMODL / IJN: [id number] / Job#: [id number]

## 2023-05-20 NOTE — P.PN
Subjective


Progress Note Date: 05/20/23


Principal diagnosis: 





Left-sided parotitis


Patient is a 77-year-old  female with a past medical his significant 

for COPD hypertension hyperlipidemia bipolar disorder and dementia resident of 

[facility] on patient  was noticed to have a significant swelling 

and hardness on the left side of the face for which the patient has been sent to

the ER, patient did have a CT suggestive significant swelling inflammation of 

the left parotid and some mandibular gland


 on today's evaluation that is 5/20/2023 patient remains to be afebrile the 

patient is breathing comfortably currently on room air denies any chest pain 

shortness of breath or cough no abdominal pain no diarrhea has been reported





Objective





- Vital Signs


Vital signs: 


                                   Vital Signs











Temp  97.1 F L  05/20/23 07:19


 


Pulse  53 L  05/20/23 07:19


 


Resp  14   05/20/23 07:19


 


BP  133/74   05/20/23 07:19


 


Pulse Ox  100   05/20/23 07:19


 


FiO2      








                                 Intake & Output











 05/19/23 05/20/23 05/20/23





 18:59 06:59 18:59


 


Weight 72.575 kg  


 


Other:   


 


  Voiding Method Diaper Diaper 


 


  # Voids  2 1














- Exam


GENERAL DESCRIPTION: An elderly female lying in bed in no distress





HEENT: Left Parotid Area swelling redness and tenderness has decreased





RESPIRATORY SYSTEM: Unlabored breathing , decreased breath sounds at bases





HEART: S1 S2 regular rate and rhythm ,





ABDOMEN: Soft , no tenderness





EXTREMITIES: No edema feet








- Labs


CBC & Chem 7: 


                                 05/20/23 05:31





                                 05/20/23 05:31


Labs: 


                  Abnormal Lab Results - Last 24 Hours (Table)











  05/20/23 05/20/23 Range/Units





  05:31 05:31 


 


RBC  3.87 L   (4.10-5.20)  X 10*6/uL


 


Hgb  10.5 L   (12.0-15.0)  g/dL


 


Hct  33.2 L   (37.2-46.3)  %


 


MCHC  31.6 L   (32.0-37.0)  g/dL


 


RDW  15.7 H   (11.5-14.5)  %


 


Potassium   3.2 L  (3.5-5.5)  mmol/L


 


Chloride   111 H  ()  mmol/L


 


Creatinine   0.5 L  (0.6-1.5)  mg/dL


 


BUN/Creatinine Ratio   25.60 H  (12.00-20.00)  Ratio


 


Calcium   7.8 L  (8.7-10.3)  mg/dL


 


Total Protein   4.6 L  (6.2-8.2)  g/dL


 


Albumin   2.4 L  (3.8-4.9)  g/dL


 


Albumin/Globulin Ratio   1.09 L  (1.60-3.17)  g/dL








                      Microbiology - Last 24 Hours (Table)











 05/16/23 13:15 Blood Culture - Preliminary





 Blood 


 


 05/16/23 13:00 Blood Culture - Preliminary





 Blood 














Assessment and Plan


(1) Acute parotitis


Current Visit: Yes   Status: Acute   Code(s): K11.21 - ACUTE SIALOADENITIS   

SNOMED Code(s): 47693792


   





(2) Cellulitis, neck


Current Visit: Yes   Status: Acute   Code(s): L03.221 - CELLULITIS OF NECK   

SNOMED Code(s): 98661762


   


Plan: 


1patient presented to hospital with significant swelling to the left side of 

the face which is tender to touch and warm concerning for left-sided parotitis 

and involvement of the left submandibular gland could be related to the oral 

javed and less likely MRSA infection


2-Patient has shown clinical improvement as the patient white count has 

normalized blood culture has been negative patient to continue with Unasyn while

inpatient and will be able to finish therapy with oral antibiotics


Time with Patient: Less than 30

## 2023-05-21 LAB
ANION GAP SERPL CALC-SCNC: 10.1 MMOL/L (ref 10–18)
BASOPHILS # BLD AUTO: 0.07 X 10*3/UL (ref 0–0.1)
BASOPHILS NFR BLD AUTO: 0.9 %
BUN SERPL-SCNC: 6.1 MG/DL (ref 9–27)
BUN/CREAT SERPL: 12.2 RATIO (ref 12–20)
CALCIUM SPEC-MCNC: 7.7 MG/DL (ref 8.7–10.3)
CHLORIDE SERPL-SCNC: 108 MMOL/L (ref 96–109)
CO2 SERPL-SCNC: 23.9 MMOL/L (ref 20–27.5)
EOSINOPHIL # BLD AUTO: 0.41 X 10*3/UL (ref 0.04–0.35)
EOSINOPHIL NFR BLD AUTO: 5.4 %
ERYTHROCYTE [DISTWIDTH] IN BLOOD BY AUTOMATED COUNT: 3.87 X 10*6/UL (ref 4.1–5.2)
ERYTHROCYTE [DISTWIDTH] IN BLOOD: 15.6 % (ref 11.5–14.5)
GLUCOSE SERPL-MCNC: 72 MG/DL (ref 70–110)
HCT VFR BLD AUTO: 33.3 % (ref 37.2–46.3)
HGB BLD-MCNC: 10.5 G/DL (ref 12–15)
IMM GRANULOCYTES BLD QL AUTO: 1.2 %
LYMPHOCYTES # SPEC AUTO: 2.58 X 10*3/UL (ref 0.9–5)
LYMPHOCYTES NFR SPEC AUTO: 34 %
MAGNESIUM SPEC-SCNC: 1.3 MG/DL (ref 1.5–2.4)
MCH RBC QN AUTO: 27.1 PG (ref 27–32)
MCHC RBC AUTO-ENTMCNC: 31.5 G/DL (ref 32–37)
MCV RBC AUTO: 86 FL (ref 80–97)
MONOCYTES # BLD AUTO: 0.73 X 10*3/UL (ref 0.2–1)
MONOCYTES NFR BLD AUTO: 9.6 %
NEUTROPHILS # BLD AUTO: 3.7 X 10*3/UL (ref 1.8–7.7)
NEUTROPHILS NFR BLD AUTO: 48.9 %
NRBC BLD AUTO-RTO: 0 /100 WBCS (ref 0–0)
PLATELET # BLD AUTO: 335 X 10*3/UL (ref 140–440)
POTASSIUM SERPL-SCNC: 3.7 MMOL/L (ref 3.5–5.5)
SODIUM SERPL-SCNC: 142 MMOL/L (ref 135–145)
WBC # BLD AUTO: 7.58 X 10*3/UL (ref 4.5–10)

## 2023-05-21 RX ADMIN — APIXABAN SCH MG: 5 TABLET, FILM COATED ORAL at 09:13

## 2023-05-21 RX ADMIN — CEFAZOLIN SCH MLS/HR: 330 INJECTION, POWDER, FOR SOLUTION INTRAMUSCULAR; INTRAVENOUS at 06:10

## 2023-05-21 RX ADMIN — MIRTAZAPINE SCH MG: 15 TABLET, FILM COATED ORAL at 20:10

## 2023-05-21 RX ADMIN — CEFAZOLIN SCH MLS/HR: 330 INJECTION, POWDER, FOR SOLUTION INTRAMUSCULAR; INTRAVENOUS at 20:10

## 2023-05-21 RX ADMIN — AMPICILLIN SODIUM AND SULBACTAM SODIUM SCH MLS/HR: 2; 1 INJECTION, POWDER, FOR SOLUTION INTRAMUSCULAR; INTRAVENOUS at 23:46

## 2023-05-21 RX ADMIN — SENNOSIDES SCH: 8.6 TABLET, FILM COATED ORAL at 08:26

## 2023-05-21 RX ADMIN — Medication SCH MCG: at 17:11

## 2023-05-21 RX ADMIN — AMPICILLIN SODIUM AND SULBACTAM SODIUM SCH MLS/HR: 2; 1 INJECTION, POWDER, FOR SOLUTION INTRAMUSCULAR; INTRAVENOUS at 17:11

## 2023-05-21 RX ADMIN — LOSARTAN POTASSIUM AND HYDROCHLOROTHIAZIDE SCH EACH: 12.5; 5 TABLET ORAL at 09:13

## 2023-05-21 RX ADMIN — APIXABAN SCH MG: 5 TABLET, FILM COATED ORAL at 20:11

## 2023-05-21 RX ADMIN — AMPICILLIN SODIUM AND SULBACTAM SODIUM SCH MLS/HR: 2; 1 INJECTION, POWDER, FOR SOLUTION INTRAMUSCULAR; INTRAVENOUS at 12:25

## 2023-05-21 RX ADMIN — AMPICILLIN SODIUM AND SULBACTAM SODIUM SCH MLS/HR: 2; 1 INJECTION, POWDER, FOR SOLUTION INTRAMUSCULAR; INTRAVENOUS at 06:12

## 2023-05-21 NOTE — P.PN
Subjective


Progress Note Date: 05/21/23


Principal diagnosis: 





Left-sided parotitis


Patient is a 77-year-old  female with a past medical his significant 

for COPD hypertension hyperlipidemia bipolar disorder and dementia resident of 

[facility] on patient  was noticed to have a significant swelling 

and hardness on the left side of the face for which the patient has been sent to

the ER, patient did have a CT suggestive significant swelling inflammation of 

the left parotid and some mandibular gland


 on today's evaluation that is 5/21/2023 patient continues to be afebrile, the 

patient is breathing comfortably currently on room air , the patient denies 

denies any chest pain shortness of breath or cough no abdominal pain no diarrhea

has been reported





Objective





- Vital Signs


Vital signs: 


                                   Vital Signs











Temp  98.4 F   05/21/23 12:39


 


Pulse  52 L  05/21/23 12:39


 


Resp  16   05/21/23 12:39


 


BP  132/69   05/21/23 12:39


 


Pulse Ox  99   05/21/23 12:39


 


FiO2      








                                 Intake & Output











 05/20/23 05/21/23 05/21/23





 18:59 06:59 18:59


 


Intake Total 120  


 


Output Total  500 300


 


Balance 120 -500 -300


 


Intake:   


 


  Oral 120  


 


Output:   


 


  Urine  500 300


 


Other:   


 


  Voiding Method Diaper Diaper Diaper


 


  # Voids 2  


 


  # Bowel Movements 1  














- Exam


GENERAL DESCRIPTION: An elderly female lying in bed in no distress





HEENT: Left Parotid Area swelling redness and tenderness has significantly 

decreased





RESPIRATORY SYSTEM: Unlabored breathing , decreased breath sounds at bases





HEART: S1 S2 regular rate and rhythm ,





ABDOMEN: Soft , no tenderness





EXTREMITIES: No edema feet








- Labs


CBC & Chem 7: 


                                 05/21/23 05:24





                                 05/21/23 05:24


Labs: 


                  Abnormal Lab Results - Last 24 Hours (Table)











  05/21/23 05/21/23 Range/Units





  05:24 05:24 


 


RBC  3.87 L   (4.10-5.20)  X 10*6/uL


 


Hgb  10.5 L   (12.0-15.0)  g/dL


 


Hct  33.3 L   (37.2-46.3)  %


 


MCHC  31.5 L   (32.0-37.0)  g/dL


 


RDW  15.6 H   (11.5-14.5)  %


 


Immature Gran #  0.09 H   (0.00-0.04)  X 10*3/uL


 


Eosinophils #  0.41 H   (0.04-0.35)  X 10*3/uL


 


BUN   6.1 L  (9.0-27.0)  mg/dL


 


Creatinine   0.5 L  (0.6-1.5)  mg/dL


 


Calcium   7.7 L  (8.7-10.3)  mg/dL


 


Magnesium   1.3 L  (1.5-2.4)  mg/dL








                      Microbiology - Last 24 Hours (Table)











 05/16/23 13:15 Blood Culture - Preliminary





 Blood 


 


 05/16/23 13:00 Blood Culture - Preliminary





 Blood 














Assessment and Plan


(1) Acute parotitis


Current Visit: Yes   Status: Acute   Code(s): K11.21 - ACUTE SIALOADENITIS   

SNOMED Code(s): 32376444


   





(2) Cellulitis, neck


Current Visit: Yes   Status: Acute   Code(s): L03.221 - CELLULITIS OF NECK   

SNOMED Code(s): 44653925


   


Plan: 


1patient presented to hospital with significant swelling to the left side of 

the face which is tender to touch and warm concerning for left-sided parotitis 

and involvement of the left submandibular gland could be related to the oral 

javed and less likely MRSA infection


2-Patient has shown clinical improvement as the patient white count has 

normalized blood culture has been negative 


3-patient to continue with Unasyn while inpatient and plan is to finish therapy 

with oral Augmentin on discharge


Time with Patient: Less than 30

## 2023-05-21 NOTE — PN
PROGRESS NOTE



DATE OF SERVICE:  05/21/2023



SUBJECTIVE:

This is a 77-year-old woman who was admitted with acute left parotid cellulitis,
is

being closely monitored.  The patient continues to be confused.



OBJECTIVE:

VITAL SIGNS:  On exam, pulse is 52, blood pressure 137/69, respirations 16.

CHEST:  Clear to auscultation.

CARDIOVASCULAR: S1, S2.

ABDOMEN:  Soft.

NECK:  Left-sided neck swelling which has been improving.



LABORATORY DATA:

Labs are reviewed.



ASSESSMENT:

1. Acute left parotitis and cellulitis.

2. Dementia.

3. Elevated WBC, present on admission.

4. Gastroesophageal reflux disease.

5. Multiple medical issues.



RECOMMENDATIONS AND DISCUSSION:

I recommend to continue current   otherwise I will recommend continue the

antibiotics.  Closely follow with Dr. Driscoll and Dr. Sullivan will follow tomorrow.





MMODL / IJN: [id number] / Job#: [id number]

MTDD

## 2023-05-22 LAB
ANION GAP SERPL CALC-SCNC: 12.3 MMOL/L (ref 10–18)
BASOPHILS # BLD AUTO: 0.07 X 10*3/UL (ref 0–0.1)
BASOPHILS NFR BLD AUTO: 0.8 %
BUN SERPL-SCNC: 3.6 MG/DL (ref 9–27)
BUN/CREAT SERPL: 8.2 RATIO (ref 12–20)
CALCIUM SPEC-MCNC: 7.4 MG/DL (ref 8.7–10.3)
CHLORIDE SERPL-SCNC: 103 MMOL/L (ref 96–109)
CO2 SERPL-SCNC: 21.2 MMOL/L (ref 20–27.5)
EOSINOPHIL # BLD AUTO: 0.41 X 10*3/UL (ref 0.04–0.35)
EOSINOPHIL NFR BLD AUTO: 4.5 %
ERYTHROCYTE [DISTWIDTH] IN BLOOD BY AUTOMATED COUNT: 4.1 X 10*6/UL (ref 4.1–5.2)
ERYTHROCYTE [DISTWIDTH] IN BLOOD: 15.7 % (ref 11.5–14.5)
GLUCOSE SERPL-MCNC: 81 MG/DL (ref 70–110)
HCT VFR BLD AUTO: 35 % (ref 37.2–46.3)
HGB BLD-MCNC: 11.2 G/DL (ref 12–15)
IMM GRANULOCYTES BLD QL AUTO: 1.2 %
LYMPHOCYTES # SPEC AUTO: 3.12 X 10*3/UL (ref 0.9–5)
LYMPHOCYTES NFR SPEC AUTO: 34.4 %
MCH RBC QN AUTO: 27.3 PG (ref 27–32)
MCHC RBC AUTO-ENTMCNC: 32 G/DL (ref 32–37)
MCV RBC AUTO: 85.4 FL (ref 80–97)
MONOCYTES # BLD AUTO: 0.72 X 10*3/UL (ref 0.2–1)
MONOCYTES NFR BLD AUTO: 7.9 %
NEUTROPHILS # BLD AUTO: 4.64 X 10*3/UL (ref 1.8–7.7)
NEUTROPHILS NFR BLD AUTO: 51.2 %
NRBC BLD AUTO-RTO: 0 /100 WBCS (ref 0–0)
PLATELET # BLD AUTO: 321 X 10*3/UL (ref 140–440)
POTASSIUM SERPL-SCNC: 3.1 MMOL/L (ref 3.5–5.5)
SODIUM SERPL-SCNC: 136 MMOL/L (ref 135–145)
WBC # BLD AUTO: 9.07 X 10*3/UL (ref 4.5–10)

## 2023-05-22 RX ADMIN — AMPICILLIN SODIUM AND SULBACTAM SODIUM SCH MLS/HR: 2; 1 INJECTION, POWDER, FOR SOLUTION INTRAMUSCULAR; INTRAVENOUS at 11:46

## 2023-05-22 RX ADMIN — CEFAZOLIN SCH MLS/HR: 330 INJECTION, POWDER, FOR SOLUTION INTRAMUSCULAR; INTRAVENOUS at 23:24

## 2023-05-22 RX ADMIN — POTASSIUM CHLORIDE SCH MLS/HR: 7.46 INJECTION, SOLUTION INTRAVENOUS at 15:09

## 2023-05-22 RX ADMIN — APIXABAN SCH MG: 5 TABLET, FILM COATED ORAL at 20:50

## 2023-05-22 RX ADMIN — SENNOSIDES SCH MG: 8.6 TABLET, FILM COATED ORAL at 09:55

## 2023-05-22 RX ADMIN — POTASSIUM CHLORIDE SCH MLS/HR: 7.46 INJECTION, SOLUTION INTRAVENOUS at 16:07

## 2023-05-22 RX ADMIN — APIXABAN SCH MG: 5 TABLET, FILM COATED ORAL at 09:54

## 2023-05-22 RX ADMIN — POTASSIUM CHLORIDE SCH MLS/HR: 7.46 INJECTION, SOLUTION INTRAVENOUS at 13:10

## 2023-05-22 RX ADMIN — SENNOSIDES SCH MG: 8.6 TABLET, FILM COATED ORAL at 09:54

## 2023-05-22 RX ADMIN — LOSARTAN POTASSIUM AND HYDROCHLOROTHIAZIDE SCH EACH: 12.5; 5 TABLET ORAL at 09:54

## 2023-05-22 RX ADMIN — AMPICILLIN SODIUM AND SULBACTAM SODIUM SCH MLS/HR: 2; 1 INJECTION, POWDER, FOR SOLUTION INTRAMUSCULAR; INTRAVENOUS at 17:30

## 2023-05-22 RX ADMIN — CEFAZOLIN SCH MLS/HR: 330 INJECTION, POWDER, FOR SOLUTION INTRAMUSCULAR; INTRAVENOUS at 08:20

## 2023-05-22 RX ADMIN — AMPICILLIN SODIUM AND SULBACTAM SODIUM SCH MLS/HR: 2; 1 INJECTION, POWDER, FOR SOLUTION INTRAMUSCULAR; INTRAVENOUS at 23:23

## 2023-05-22 RX ADMIN — MIRTAZAPINE SCH MG: 15 TABLET, FILM COATED ORAL at 20:50

## 2023-05-22 RX ADMIN — AMPICILLIN SODIUM AND SULBACTAM SODIUM SCH MLS/HR: 2; 1 INJECTION, POWDER, FOR SOLUTION INTRAMUSCULAR; INTRAVENOUS at 05:09

## 2023-05-22 RX ADMIN — Medication SCH MCG: at 17:30

## 2023-05-22 RX ADMIN — POTASSIUM CHLORIDE SCH MLS/HR: 7.46 INJECTION, SOLUTION INTRAVENOUS at 14:10

## 2023-05-22 NOTE — P.PN
Subjective


Progress Note Date: 05/22/23





HISTORY OF PRESENT ILLNESS


 


This is a 77-year-old female patient with past medical history of COPD, hyper

tension, hyperlipidemia, bipolar disorder, dementia, left lower extremity DVT on

eliquis, clavicular fracture, history of the myotonic clonic jerks,  She has a 

history of hospitalization due to acute rhabdomyolysis.  Patient is a long-term 

resident at [facility].  Patient was sent into [facility] after the nursing staff at [facility] [place] found her to have a 

significant swelling and increased hardness of the left side of her face and the

parotid gland that was exquisitely tender to touch, with mental status changes 

and not able to eat or drink anything, [facility] is directed to send the 

patient to the ER via EMS patient was seen and evaluated in the emergency 

department and she had a computed tomography scan that showed evidence of 

significance of Lantus without evidence of an actual abscess, patient appeared 

to have a significant parotitis with significant left-sided facial cellulitis.  

Patient was started on IV antibiotic in the form of Unasyn, she was admitted to 

the hospital infectious disease consultation as well as ENT consultation was 

obtained.





5/18: patient has been seen by ENT for left parotid infection acute.  

Recommendations to continue Unasyn and a blood culture, keep head elevated 20 

angle to help reduce swelling.  patient has also been seen by Dr. Novoa 

managing antibiotics.consult is in place with neurology for myoclonic jerks. 

Repeat blood work reveals WBC 21.4, hemoglobin 12.1.  Platelet count 324.  

Electrolytes are normal.  BUN 31 creatinine 0.7.  Total bilirubin 1.4, AST 72, 

ALT 60, alkaline phosphatase 105.  Blood cultures are status received.





5/22: Over the weekend, patient was continued on IV antibiotics in the form of 

Unasyn.  Patient's swelling to the left jaw and some mandible seems to be less 

in size but very firm to the touch and very tender.  Daughter was concerned that

there was a tooth problem causing her pain and refusal to eat.  Patient does 

have absent molars on the left lower.  She is tender but seems to be more in the

low buccal area.  No drainage noted in her mouth.  Patient has been refusing to 

eat despite help with feeding.  We will ask for Dr. Godfrey to recheck 

the patient and continue IV antibiotics for another 24 hours.  Dr. Sine-Aid is 

made recommendations to transition to oral Augmentin on discharge.








REVIEW OF SYSTEMS


 Constitutional: No documented fever, no chills, chronic weight loss.  Chronic 

weakness, fatigue or lethargy.  Noted daytime sleepiness.


 HEENT: No headache. No blurred vision or double vision. No nasal drainage or 

congestion. No epistaxis.  left jaw pain.  


 Lungs: No shortness of breath, cough, no sputum production. No wheezing.


 Cardiovascular: No chest pain, no lower extremity edema.  Reported syncopal 

episode.


 Abdominal: No abdominal pain. No nausea, vomiting. No diarrhea. No 

constipation. No bloody or tarry stools.  Chronic loss of appetite. patient 

refusing to eat.  


 Genitourinary: No dysuria, increased frequency, urgency. No urinary retention.


 Musculoskeletal: No myalgias.  Chronic muscle weakness, chronic gait 

dysfunction, previous falls. No back pain. No neck pain.


 Integumentary: Pre-decub changes in the sacral area.


 Neurologic: no aphasia. No facial droop.  Chronic confusion secondary to 

dementia. No head injury. No headache. No paralysis. No paresthesia.


 Endocrine: No abnormal blood sugars. No weight change. No excessive sweating or

thirst. No cold intolerance.


 





 PHYSICAL EXAMINATION


 Gen: This is a thin cachectic appearing 77-year-old  female.  Patient 

is resting in bed and appears to be comfortable and in no acute distress.


 HEENT: Head is atraumatic left-sided fa swelling improved with tenderness r

emaining.  Left parotid arefirm to the touch with tenderness. Pupils equal, 

round. Sclerae is anicteric, mucous membranes of the mouth is dry.


 NECK: Supple, there is significant hardness to the left side of the neck and 

left parotid gland


 LUNGS: decreased breath sounds at the bases with few ronchi, no expiratory 

wheezes, no chest wall tenderness


 HEART: First heart sound is depressed, second heart sound is normal there is 

CHRISTOPHER 2/6 located a the left sternal border.


 ABDOMEN: Soft. Bowel sounds are present. No masses. No tenderness.


 EXTREMITIES: No pedal edema. No calf tenderness.DP +1 bilaterally


 NEUROLOGICAL: Patient is awake,alert and oriented to person, chronic confusion 

secondary to underlying dementia.  Generalized weakness to upper and lower 

extremities.  








 ASSESSMENT AND PLAN





1.  Left facial cellulitis involving the submandibular gland as well as left 

parotitis.  No myles abscess as of yet, continue Unasyn, consult with infectious

disease appreciated, blood culfinalized with no growthontinue Tylenol for pain 

control, ENT evaluation appreciated.  


 


2.  Metabolic encephalopathy secondary to underlying dementia as well as left 

facial cellulitis with parotitis and submandibular gland infection with sepsis 

present on admission.  Continue IV antibiotic in the form of Unasyn 3 g IV 

piggyback every 6 hours, monitor the patient's symptoms very closely.  





3.  History of DVT left lower extremity.  Patient continued on eliquis 5 mg 

twice daily.





4.  Generalized debility with worsening symptoms and difficulty performing her 

own ADLs, requiring more encouragement.  





5.  Hypertension.  Patient will be continued on losartan/hydrochlorothiazide 

84520 0.5 mg daily daily.  





6.  Hyperlipidemia.  Patient is not currently on statin.





7.  COPD, stable.  No exacerbation.





8.  Remote history of tobacco use and dependence.  Patient quit smoking in 2021.





9.  Bipolar disorder.  patient is not taking any medication at this point in 

time.  





10.  Severe protein calorie malnutrition, chronic.  Patient resumed on regular 

diet, did not do well with Remeron therefore it will be discontinued.  





11.  Myotonic clonic involuntary movements.  





12.  DVT prophylaxis.  Eliquis 5 mg orally twice every day.  





13.  GI prophylaxis.  Continue Protonix 40 mg orally once every day.  





Patient has a public guardian.





She is full code.





Impression and plan of care have been directed as dictated by the signing 

physician.  Patti Escobar nurse practitioner acting as scribe for signing patt ford.





Objective





- Vital Signs


Vital signs: 


                                   Vital Signs











Temp  98.8 F   05/22/23 07:50


 


Pulse  55 L  05/22/23 07:50


 


Resp  18   05/22/23 07:50


 


BP  125/68   05/22/23 07:50


 


Pulse Ox  99   05/22/23 07:50


 


FiO2      








                                 Intake & Output











 05/21/23 05/22/23 05/22/23





 18:59 06:59 18:59


 


Intake Total 480  


 


Output Total 550 1000 


 


Balance -70 -1000 


 


Intake:   


 


  Oral 480  


 


Output:   


 


  Urine 550 1000 


 


Other:   


 


  Voiding Method Diaper Diaper Diaper





  External Catheter External Catheter


 


  # Voids 3  














- Labs


CBC & Chem 7: 


                                 05/22/23 06:18





                                 05/22/23 06:18


Labs: 


                  Abnormal Lab Results - Last 24 Hours (Table)











  05/22/23 05/22/23 Range/Units





  06:18 06:18 


 


Hgb  11.2 L   (12.0-15.0)  g/dL


 


Hct  35.0 L   (37.2-46.3)  %


 


RDW  15.7 H   (11.5-14.5)  %


 


Immature Gran #  0.11 H   (0.00-0.04)  X 10*3/uL


 


Eosinophils #  0.41 H   (0.04-0.35)  X 10*3/uL


 


Potassium   3.1 L  (3.5-5.5)  mmol/L


 


BUN   3.6 L  (9.0-27.0)  mg/dL


 


Creatinine   0.4 L  (0.6-1.5)  mg/dL


 


BUN/Creatinine Ratio   8.20 L  (12.00-20.00)  Ratio


 


Calcium   7.4 L  (8.7-10.3)  mg/dL








                      Microbiology - Last 24 Hours (Table)











 05/16/23 13:15 Blood Culture - Final





 Blood 


 


 05/16/23 13:00 Blood Culture - Final





 Blood

## 2023-05-23 VITALS — DIASTOLIC BLOOD PRESSURE: 75 MMHG | HEART RATE: 55 BPM | SYSTOLIC BLOOD PRESSURE: 148 MMHG | TEMPERATURE: 98.4 F

## 2023-05-23 VITALS — RESPIRATION RATE: 20 BRPM

## 2023-05-23 RX ADMIN — APIXABAN SCH MG: 5 TABLET, FILM COATED ORAL at 09:26

## 2023-05-23 RX ADMIN — AMPICILLIN SODIUM AND SULBACTAM SODIUM SCH MLS/HR: 2; 1 INJECTION, POWDER, FOR SOLUTION INTRAMUSCULAR; INTRAVENOUS at 12:47

## 2023-05-23 RX ADMIN — LOSARTAN POTASSIUM AND HYDROCHLOROTHIAZIDE SCH EACH: 12.5; 5 TABLET ORAL at 09:26

## 2023-05-23 RX ADMIN — SENNOSIDES SCH MG: 8.6 TABLET, FILM COATED ORAL at 09:26

## 2023-05-23 RX ADMIN — CEFAZOLIN SCH: 330 INJECTION, POWDER, FOR SOLUTION INTRAMUSCULAR; INTRAVENOUS at 12:17

## 2023-05-23 RX ADMIN — Medication SCH MCG: at 17:36

## 2023-05-23 RX ADMIN — AMPICILLIN SODIUM AND SULBACTAM SODIUM SCH MLS/HR: 2; 1 INJECTION, POWDER, FOR SOLUTION INTRAMUSCULAR; INTRAVENOUS at 17:36

## 2023-05-23 RX ADMIN — AMPICILLIN SODIUM AND SULBACTAM SODIUM SCH MLS/HR: 2; 1 INJECTION, POWDER, FOR SOLUTION INTRAMUSCULAR; INTRAVENOUS at 05:17

## 2023-05-23 NOTE — P.PN
Subjective


Progress Note Date: 05/23/23


Principal diagnosis: 





Left-sided parotitis


Patient is a 77-year-old  female with a past medical his significant 

for COPD hypertension hyperlipidemia bipolar disorder and dementia resident of 

[facility] patient  was noticed to have a significant swelling 

and hardness on the left side of the face for which the patient has been sent to

the ER, patient did have a CT suggestive significant swelling inflammation of 

the left parotid and some mandibular gland


 on today's evaluation that is 5/23/2023 patient   continues to beafebrile, the 

patient is breathing comfortably  on room air , the patient currently denies  

pain to the left side of face , the patient denies any chest pain shortness of 

breath or cough no abdominal pain no diarrhea has been reported





Objective





- Vital Signs


Vital signs: 


                                   Vital Signs











Temp  98.5 F   05/23/23 07:35


 


Pulse  62   05/23/23 07:35


 


Resp  20   05/23/23 07:35


 


BP  162/74   05/23/23 07:35


 


Pulse Ox  98   05/23/23 07:35


 


FiO2      








                                 Intake & Output











 05/22/23 05/23/23 05/23/23





 18:59 06:59 18:59


 


Intake Total  1060 


 


Output Total  1050 


 


Balance  10 


 


Intake:   


 


  Intake, IV Titration  1000 





  Amount   


 


    Ampicillin-Sulbactam 3 gm  200 





    In Sodium Chloride 0.9%   





    100 ml @ 200 mls/hr IVPB   





    Q6HR CHANTE Rx#:[id number]   


 


    Sodium Chloride 0.9% 1,  800 





    000 ml @ 75 mls/hr IV .   





    [id number] CHANTE Rx#:[id number]   


 


  Oral  60 


 


Output:   


 


  Urine  1050 


 


Other:   


 


  Voiding Method Diaper Diaper 





 External Catheter External Catheter 


 


  # Voids 1  














- Exam


GENERAL DESCRIPTION: An elderly female lying in bed in no distress





HEENT: Left Parotid Area swelling redness and tenderness has improved





RESPIRATORY SYSTEM: Unlabored breathing , decreased breath sounds at bases





HEART: S1 S2 regular rate and rhythm ,





ABDOMEN: Soft , no tenderness





EXTREMITIES: No edema feet








- Labs


CBC & Chem 7: 


                                 05/22/23 06:18





                                 05/22/23 06:18


Labs: 


                  Abnormal Lab Results - Last 24 Hours (Table)











  05/22/23 05/22/23 Range/Units





  06:18 06:18 


 


Hgb  11.2 L   (12.0-15.0)  g/dL


 


Hct  35.0 L   (37.2-46.3)  %


 


RDW  15.7 H   (11.5-14.5)  %


 


Immature Gran #  0.11 H   (0.00-0.04)  X 10*3/uL


 


Eosinophils #  0.41 H   (0.04-0.35)  X 10*3/uL


 


Potassium   3.1 L  (3.5-5.5)  mmol/L


 


BUN   3.6 L  (9.0-27.0)  mg/dL


 


Creatinine   0.4 L  (0.6-1.5)  mg/dL


 


BUN/Creatinine Ratio   8.20 L  (12.00-20.00)  Ratio


 


Calcium   7.4 L  (8.7-10.3)  mg/dL








                      Microbiology - Last 24 Hours (Table)











 05/16/23 13:15 Blood Culture - Final





 Blood 


 


 05/16/23 13:00 Blood Culture - Final





 Blood 














Assessment and Plan


(1) Acute parotitis


Current Visit: Yes   Status: Acute   Code(s): K11.21 - ACUTE SIALOADENITIS   

SNOMED Code(s): 03650391


   





(2) Cellulitis, neck


Current Visit: Yes   Status: Acute   Code(s): L03.221 - CELLULITIS OF NECK   

SNOMED Code(s): 27906467


   


Plan: 


1patient presented to hospital with significant swelling to the left side of 

the face which is tender to touch and warm concerning for left-sided parotitis 

and involvement of the left submandibular gland could be related to the oral 

javed and less likely MRSA infection


2-Patient has shown clinical improvement as the patient white count has 

normalized, Left side face swelling and tenderness has improved blood culture 

has been negative , patient to continue with Unasyn while inpatient and plan to 

finish therapy with oral Augmentin 10 days on discharge


Time with Patient: Less than 30

## 2023-05-23 NOTE — P.DS
Providers


Date of admission: 


05/16/23 15:54





Expected date of discharge: 05/23/23


Attending physician: 


Florence Sullivan





Consults: 





                                        





05/16/23 15:50


Consult Physician Routine 


   Consulting Provider: Cr Godfrey


   Consult Reason/Comments: neck cellulitis


   Do you want consulting provider notified?: Yes


Consult Physician Routine 


   Consulting Provider: Devon Driscoll


   Consult Reason/Comments: cellulitis


   Do you want consulting provider notified?: Yes





05/17/23 17:00


Consult Physician Routine 


   Consulting Provider: César Luz


   Consult Reason/Comments: Myotonic clonic jerks


   Do you want consulting provider notified?: Yes











Primary care physician: 


Florence Sullivan





[facility] Course: 





HISTORY OF PRESENT ILLNESS


 


This is a 77-year-old female patient with past medical history of COPD, 

hypertension, hyperlipidemia, bipolar disorder, dementia, left lower extremity 

DVT on eliquis, clavicular fracture, history of the myotonic clonic jerks,  She 

has a history of hospitalization due to acute rhabdomyolysis.  Patient is a 

long-term resident at [facility].  Patient was sent into [facility] after the nursing staff at medical [facility] found her to have a

significant swelling and increased hardness of the left side of her face and the

parotid gland that was exquisitely tender to touch, with mental status changes 

and not able to eat or drink anything, Stanley Derek is directed to send the 

patient to the ER via EMS patient was seen and evaluated in the emergency 

department and she had a computed tomography scan that showed evidence of 

significance of Lantus without evidence of an actual abscess, patient appeared 

to have a significant parotitis with significant left-sided facial cellulitis.  

Patient was started on IV antibiotic in the form of Unasyn, she was admitted to 

the hospital infectious disease consultation as well as ENT consultation was 

obtained.





5/18: patient has been seen by ENT for left parotid infection acute.  

Recommendations to continue Unasyn and a blood culture, keep head elevated 20 

angle to help reduce swelling.  patient has also been seen by Dr. Novoa 

managing antibiotics.consult is in place with neurology for myoclonic jerks. Rep

eat blood work reveals WBC 21.4, hemoglobin 12.1.  Platelet count 324.  

Electrolytes are normal.  BUN 31 creatinine 0.7.  Total bilirubin 1.4, AST 72, 

ALT 60, alkaline phosphatase 105.  Blood cultures are status received.





5/22: Over the weekend, patient was continued on IV antibiotics in the form of 

Unasyn.  Patient's swelling to the left jaw and some mandible seems to be less 

in size but very firm to the touch and very tender.  Daughter was concerned that

there was a tooth problem causing her pain and refusal to eat.  Patient does 

have absent molars on the left lower.  She is tender but seems to be more in the

low buccal area.  No drainage noted in her mouth.  Patient has been refusing to 

eat despite help with feeding.  We will ask for Dr. Godfrey to recheck 

the patient and continue IV antibiotics for another 24 hours.  Dr. Novoa is 

made recommendations to transition to oral Augmentin on discharge.





5/23: Patient is seen today in follow-up.  The swelling and tenderness seems to 

be somewhat improved to the left sub-mandibular area.  Patient has not been 

reevaluated by ENT.  His heart rate is running in the 50s and 60s, blood 

pressure 148/75, pulse ox 90% on room air.  Patient is essentially refusing to 

eat.  She does want to get back to her home.  Dr. camp is recommended Augmentin 

on discharge for 10 days.  Patient will be discharged back to [facility] in

stable condition.








DISCHARGE DIAGNOSES:


1.  Left facial cellulitis involving the submandibular gland as well as left 

parotitis.  


2.  Metabolic encephalopathy secondary to underlying dementia as well as left 

facial cellulitis with parotitis and submandibular gland infection with sepsis 

present on admission. 


3.  History of DVT left lower extremity.  


4.  Generalized debility with worsening symptoms and difficulty performing her 

own ADLs, requiring more encouragement.  


5.  Hypertension.  


6.  Hyperlipidemia.  


7.  COPD, stable.  


8.  Remote history of tobacco use and dependence. 


9.  Bipolar disorder.  


10.  Severe protein calorie malnutrition, chronic. 


11.  Myotonic clonic involuntary movements.  





Greater than 35 minutes was utilized and coordinating patient's discharge.


Impression and plan of care have been directed as dictated by the signing 

physician.  Patti Escobar nurse practitioner acting as scribe for signing 

physician.


Patient Condition at Discharge: Serious





Plan - Discharge Summary


New Discharge Prescriptions: 


New


   Amoxic-Pot Clav 500-125 mg [Augmentin 500-125 mg] 1 tab PO Q12HR #20 tab





Continue


   Magnesium Hydroxide [Milk of Magnesia] 2,400 mg PO Q72H PRN


     PRN Reason: Constipation


   bisacodyL 10 mg RECTAL DAILY PRN


     PRN Reason: Constipation


   Acetaminophen [Acetaminophen ER] 650 mg PO Q6H PRN


     PRN Reason: Pain


   Apixaban [Eliquis] 5 mg PO BID


   Prostat  30 ml PO DAILY@0800


   Losartan-Hctz 50-12.5 mg [Hyzaar 50-12.5] 1 tab PO DAILY


   Sennosides [Senokot] 8.6 mg PO DAILY


   Cholecalciferol [Vitamin D3 (25 Mcg = 1000 Iu)] 25 mcg PO DAILY@1600


   Healthshake 1 can PO TID-W/MEALS





Discontinued


   Acetaminophen [Tylenol] 650 mg PO Q6H PRN


     PRN Reason: Fever And/ Or Pain


   Mirtazapine [Remeron Soluspan] 30 mg PO HS


Discharge Medication List





Acetaminophen [Acetaminophen ER] 650 mg PO Q6H PRN 11/01/22 [History]


Magnesium Hydroxide [Milk of Magnesia] 2,400 mg PO Q72H PRN 11/01/22 [History]


bisacodyL 10 mg RECTAL DAILY PRN 11/01/22 [History]


Apixaban [Eliquis] 5 mg PO BID 11/16/22 [History]


Cholecalciferol [Vitamin D3 (25 Mcg = 1000 Iu)] 25 mcg PO DAILY@1600 12/28/22 

[History]


Healthshake 1 can PO TID-W/MEALS 04/17/23 [History]


Losartan-Hctz 50-12.5 mg [Hyzaar 50-12.5] 1 tab PO DAILY 04/17/23 [History]


Prostat  30 ml PO DAILY@0800 04/17/23 [History]


Sennosides [Senokot] 8.6 mg PO DAILY 05/16/23 [History]


Amoxic-Pot Clav 500-125 mg [Augmentin 500-125 mg] 1 tab PO Q12HR #20 tab 

05/23/23 [Rx]








Follow up Appointment(s)/Referral(s): 


Florence Sullivan MD [Primary Care Provider] - 1-2 days


Discharge Disposition: TRANSFER TO SNF/ECF Within functional limits Wet vocal quality post oral intake/Cough post oral intake/Throat clear post oral intake/Multiple swallows

## 2023-05-23 NOTE — P.PN
Subjective


Progress Note Date: 05/22/23


Principal diagnosis: 





Left-sided parotitis


Patient is a 77-year-old  female with a past medical his significant 

for COPD hypertension hyperlipidemia bipolar disorder and dementia resident of 

[facility] patient  was noticed to have a significant swelling 

and hardness on the left side of the face for which the patient has been sent to

the ER, patient did have a CT suggestive significant swelling inflammation of 

the left parotid and some mandibular gland


 on today's evaluation that is 5/22/2023 patient is afebrile, the patient is 

breathing comfortably  on room air , the patient pain to the left side of face 

has decreased , pt denies any chest pain shortness of breath or cough no 

abdominal pain no diarrhea has been reported





Objective





- Vital Signs


Vital signs: 


                                   Vital Signs











Temp  98.8 F   05/22/23 07:50


 


Pulse  55 L  05/22/23 07:50


 


Resp  18   05/22/23 07:50


 


BP  125/68   05/22/23 07:50


 


Pulse Ox  99   05/22/23 07:50


 


FiO2      








                                 Intake & Output











 05/21/23 05/22/23 05/22/23





 18:59 06:59 18:59


 


Intake Total 480  


 


Output Total 550 1000 


 


Balance -70 -1000 


 


Intake:   


 


  Oral 480  


 


Output:   


 


  Urine 550 1000 


 


Other:   


 


  Voiding Method Diaper Diaper 





  External Catheter 


 


  # Voids 3  














- Exam


GENERAL DESCRIPTION: An elderly female lying in bed in no distress





HEENT: Left Parotid Area swelling redness and tenderness has significantly 

decreased





RESPIRATORY SYSTEM: Unlabored breathing , decreased breath sounds at bases





HEART: S1 S2 regular rate and rhythm ,





ABDOMEN: Soft , no tenderness





EXTREMITIES: No edema feet








- Labs


CBC & Chem 7: 


                                 05/22/23 06:18





                                 05/22/23 06:18


Labs: 


                  Abnormal Lab Results - Last 24 Hours (Table)











  05/22/23 Range/Units





  06:18 


 


Hgb  11.2 L  (12.0-15.0)  g/dL


 


Hct  35.0 L  (37.2-46.3)  %


 


RDW  15.7 H  (11.5-14.5)  %


 


Immature Gran #  0.11 H  (0.00-0.04)  X 10*3/uL


 


Eosinophils #  0.41 H  (0.04-0.35)  X 10*3/uL








                      Microbiology - Last 24 Hours (Table)











 05/16/23 13:15 Blood Culture - Final





 Blood 


 


 05/16/23 13:00 Blood Culture - Final





 Blood 














Assessment and Plan


(1) Acute parotitis


Current Visit: Yes   Status: Acute   Code(s): K11.21 - ACUTE SIALOADENITIS   

SNOMED Code(s): 45560702


   





(2) Cellulitis, neck


Current Visit: Yes   Status: Acute   Code(s): L03.221 - CELLULITIS OF NECK   

SNOMED Code(s): 19502101


   


Plan: 


1patient presented to hospital with significant swelling to the left side of 

the face which is tender to touch and warm concerning for left-sided parotitis 

and involvement of the left submandibular gland could be related to the oral 

javed and less likely MRSA infection


2-Patient has shown clinical improvement as the patient white count has 

normalized, Left side face swelling and tenderness has improved blood culture 

has been negative 


3-patient to continue with Unasyn while inpatient and  to finish therapy with 

oral Augmentin x 10 days on discharge


Time with Patient: Less than 30